# Patient Record
Sex: FEMALE | Race: WHITE | Employment: UNEMPLOYED | URBAN - METROPOLITAN AREA
[De-identification: names, ages, dates, MRNs, and addresses within clinical notes are randomized per-mention and may not be internally consistent; named-entity substitution may affect disease eponyms.]

---

## 2019-06-14 ENCOUNTER — OFFICE VISIT (OUTPATIENT)
Dept: FAMILY MEDICINE CLINIC | Age: 39
End: 2019-06-14
Payer: COMMERCIAL

## 2019-06-14 VITALS
HEIGHT: 66 IN | SYSTOLIC BLOOD PRESSURE: 104 MMHG | WEIGHT: 129 LBS | TEMPERATURE: 98.3 F | DIASTOLIC BLOOD PRESSURE: 78 MMHG | BODY MASS INDEX: 20.73 KG/M2 | RESPIRATION RATE: 14 BRPM | OXYGEN SATURATION: 98 % | HEART RATE: 74 BPM

## 2019-06-14 DIAGNOSIS — Z98.1 HISTORY OF SPINAL FUSION: ICD-10-CM

## 2019-06-14 DIAGNOSIS — Z00.00 ROUTINE HEALTH MAINTENANCE: ICD-10-CM

## 2019-06-14 DIAGNOSIS — Z98.51 HISTORY OF TUBAL LIGATION: ICD-10-CM

## 2019-06-14 DIAGNOSIS — R35.0 URINARY FREQUENCY: ICD-10-CM

## 2019-06-14 DIAGNOSIS — Z72.0 TOBACCO USE: ICD-10-CM

## 2019-06-14 DIAGNOSIS — N93.9 ABNORMAL UTERINE BLEEDING: ICD-10-CM

## 2019-06-14 DIAGNOSIS — R00.2 PALPITATION: ICD-10-CM

## 2019-06-14 DIAGNOSIS — M79.601 BILATERAL ARM PAIN: ICD-10-CM

## 2019-06-14 DIAGNOSIS — M79.602 BILATERAL ARM PAIN: ICD-10-CM

## 2019-06-14 DIAGNOSIS — Z00.00 WELL ADULT HEALTH CHECK: Primary | ICD-10-CM

## 2019-06-14 LAB
BASOPHILS ABSOLUTE: 0.1 K/UL (ref 0–0.2)
BASOPHILS RELATIVE PERCENT: 0.9 %
BILIRUBIN, POC: ABNORMAL
BLOOD URINE, POC: ABNORMAL
CLARITY, POC: CLEAR
COLOR, POC: ABNORMAL
EOSINOPHILS ABSOLUTE: 0.1 K/UL (ref 0–0.6)
EOSINOPHILS RELATIVE PERCENT: 0.9 %
FERRITIN: 25.7 NG/ML (ref 15–150)
GLUCOSE URINE, POC: ABNORMAL
HCT VFR BLD CALC: 42 % (ref 36–48)
HEMOGLOBIN: 13.9 G/DL (ref 12–16)
KETONES, POC: ABNORMAL
LEUKOCYTE EST, POC: ABNORMAL
LYMPHOCYTES ABSOLUTE: 1.3 K/UL (ref 1–5.1)
LYMPHOCYTES RELATIVE PERCENT: 18.3 %
MCH RBC QN AUTO: 30.1 PG (ref 26–34)
MCHC RBC AUTO-ENTMCNC: 33.2 G/DL (ref 31–36)
MCV RBC AUTO: 90.6 FL (ref 80–100)
MONOCYTES ABSOLUTE: 0.4 K/UL (ref 0–1.3)
MONOCYTES RELATIVE PERCENT: 5.7 %
NEUTROPHILS ABSOLUTE: 5.4 K/UL (ref 1.7–7.7)
NEUTROPHILS RELATIVE PERCENT: 74.2 %
NITRITE, POC: ABNORMAL
PDW BLD-RTO: 13.3 % (ref 12.4–15.4)
PH, POC: 7.5
PLATELET # BLD: 321 K/UL (ref 135–450)
PMV BLD AUTO: 9.8 FL (ref 5–10.5)
PROTEIN, POC: ABNORMAL
RBC # BLD: 4.64 M/UL (ref 4–5.2)
SPECIFIC GRAVITY, POC: 1.02
TSH SERPL DL<=0.05 MIU/L-ACNC: 0.45 UIU/ML (ref 0.27–4.2)
UROBILINOGEN, POC: 0.2
WBC # BLD: 7.3 K/UL (ref 4–11)

## 2019-06-14 PROCEDURE — 81002 URINALYSIS NONAUTO W/O SCOPE: CPT | Performed by: FAMILY MEDICINE

## 2019-06-14 PROCEDURE — 99385 PREV VISIT NEW AGE 18-39: CPT | Performed by: FAMILY MEDICINE

## 2019-06-14 PROCEDURE — 93000 ELECTROCARDIOGRAM COMPLETE: CPT | Performed by: FAMILY MEDICINE

## 2019-06-14 RX ORDER — NAPROXEN 375 MG/1
375 TABLET ORAL 2 TIMES DAILY WITH MEALS
Qty: 60 TABLET | Refills: 1 | Status: SHIPPED | OUTPATIENT
Start: 2019-06-14 | End: 2019-07-02 | Stop reason: DRUGHIGH

## 2019-06-14 ASSESSMENT — ENCOUNTER SYMPTOMS
SINUS PRESSURE: 0
COUGH: 0
SHORTNESS OF BREATH: 0
COLOR CHANGE: 0
SORE THROAT: 0
VOMITING: 0
DIARRHEA: 0
EYE REDNESS: 0
NAUSEA: 0

## 2019-06-14 ASSESSMENT — PATIENT HEALTH QUESTIONNAIRE - PHQ9
SUM OF ALL RESPONSES TO PHQ QUESTIONS 1-9: 2
1. LITTLE INTEREST OR PLEASURE IN DOING THINGS: 1
SUM OF ALL RESPONSES TO PHQ QUESTIONS 1-9: 2
2. FEELING DOWN, DEPRESSED OR HOPELESS: 1
SUM OF ALL RESPONSES TO PHQ9 QUESTIONS 1 & 2: 2

## 2019-06-14 NOTE — PROGRESS NOTES
6/14/2019    Fauzia Snell is a 43 y.o. female here to establish care with me. Chief Complaint   Patient presents with    New Patient     Pt is here to est. care. Pt c/o pain in both arms for th past couple of months. Pt also c/o heart palpatations for the past four months and is concerned about afib. HPI   Recently moved from [facility], [address] her to be with her father, Tamir Pike (patient here) to care for him with hospice due to his cancer  - taking the summer off to care for her father, and she will consider starting back up after Hospice  - has 3 children ages 25, 8, and 8    Has hx of spinal fusion surgery. 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  - still has some chronic neck and back pain  - pain is overall manageable, but does have some bad days when it flares up  - does have some pain that radiates into legs bilaterally  - no focal weakness or incontinence    Bilateral arm pain  - going on for the past couple of months, worse over the past month  - she has been using heat, ice, tylenol, and ibuprofen which were somewhat helpful  - pain is in bilateral wrists down to her elbows, sometimes radiating into her thumb  - does get a burning, tingling pain sometimes  - long drives will often make it worse, or doing repetitive things with her hands    Palpitations  - going on for the past 4 months  - is concerned because a fib runs in her family  - in the past was happening multiple times per day, will last from a few up to 30 minutes. Feels like she will be quite tired after it happens, or a bit dizzy  - hasn't happened now for about a week    Sees  for her gyn care. Has hx of tubal ligation. Had pap this year    Urinary  - has frequency and not emptying all the way sometimes and would like testing for UTI     Review of Systems   Constitutional: Negative for chills and fever. HENT: Negative for congestion, sinus pressure and sore throat.     Eyes: Negative for redness and visual kg/m²     BP Readings from Last 3 Encounters:   06/14/19 104/78       Wt Readings from Last 3 Encounters:   06/14/19 129 lb (58.5 kg)        Physical Exam   Constitutional: She is oriented to person, place, and time. She appears well-developed and well-nourished. HENT:   Head: Normocephalic and atraumatic. Mouth/Throat: Oropharynx is clear and moist.   TMs normal bilaterally   Eyes: Conjunctivae and EOM are normal.   Neck: Normal range of motion. Neck supple. No thyromegaly present. Cardiovascular: Normal rate, regular rhythm and normal heart sounds. No murmur heard. Pulmonary/Chest: Effort normal and breath sounds normal. She has no wheezes. Abdominal: Soft. Bowel sounds are normal. She exhibits no mass. There is no tenderness. Musculoskeletal: Normal range of motion. She exhibits no edema. NECK  Negative Spurling's test  Normal neck ROM    ELBOW  Mild TTP lateral epicondyle and extensor muscles    WRIST  Equivocal Tinel's   Lymphadenopathy:     She has no cervical adenopathy. Neurological: She is alert and oriented to person, place, and time. She displays normal reflexes. Skin: Skin is warm and dry. No rash noted. Normal turgor   Psychiatric: She has a normal mood and affect. Thought content normal.       ASSESSMENT/PLAN:  1. Well adult health check    2. Palpitation  ekg normal. Due to prolonged episodes where she feels light-headed we will check a Holter. May also be stress related with her dad being in Hospice  - EKG 12 Lead  - Holter Monitor 24 Hour; Future    3. Routine health maintenance - sees  for Gyn care    4. History of tubal ligation    5. Tobacco use  Discussed cessation. We will work toward this    6. History of spinal fusion    7. Urinary frequency  U/A unremarkable, follow culture  - POCT Urinalysis no Micro  - URINE CULTURE    8. Bilateral arm pain  ?carpal tunnel, she does have a lot of muscle tenderness sometimes seen in fibro.  Advised nighttime splinting and NSAIDs as there are no red flags such as weakness. Consider EMG in future if needed  - naproxen (NAPROSYN) 375 MG tablet; Take 1 tablet by mouth 2 times daily (with meals)  Dispense: 60 tablet; Refill: 1    9. Abnormal uterine bleeding  Saw Gyn yesterday to address this. Check to make sure not anemic.  - CBC Auto Differential; Future  - TSH without Reflex; Future  - FERRITIN; Future      Return in about 4 months (around 10/14/2019) for arm pain and palpitation follow up.

## 2019-06-16 LAB
ORGANISM: ABNORMAL
URINE CULTURE, ROUTINE: ABNORMAL
URINE CULTURE, ROUTINE: ABNORMAL

## 2019-06-17 RX ORDER — NITROFURANTOIN 25; 75 MG/1; MG/1
100 CAPSULE ORAL 2 TIMES DAILY
Qty: 14 CAPSULE | Refills: 0 | Status: SHIPPED | OUTPATIENT
Start: 2019-06-17 | End: 2019-06-24

## 2019-06-27 ENCOUNTER — OFFICE VISIT (OUTPATIENT)
Dept: FAMILY MEDICINE CLINIC | Age: 39
End: 2019-06-27
Payer: COMMERCIAL

## 2019-06-27 ENCOUNTER — TELEPHONE (OUTPATIENT)
Dept: FAMILY MEDICINE CLINIC | Age: 39
End: 2019-06-27

## 2019-06-27 VITALS
OXYGEN SATURATION: 98 % | BODY MASS INDEX: 20.67 KG/M2 | WEIGHT: 128.6 LBS | HEIGHT: 66 IN | DIASTOLIC BLOOD PRESSURE: 74 MMHG | SYSTOLIC BLOOD PRESSURE: 118 MMHG | HEART RATE: 92 BPM | RESPIRATION RATE: 15 BRPM | TEMPERATURE: 98.9 F

## 2019-06-27 DIAGNOSIS — M79.602 BILATERAL ARM PAIN: Primary | ICD-10-CM

## 2019-06-27 DIAGNOSIS — M79.601 BILATERAL ARM PAIN: Primary | ICD-10-CM

## 2019-06-27 DIAGNOSIS — R35.0 URINARY FREQUENCY: ICD-10-CM

## 2019-06-27 PROCEDURE — 99214 OFFICE O/P EST MOD 30 MIN: CPT | Performed by: FAMILY MEDICINE

## 2019-06-27 PROCEDURE — G8427 DOCREV CUR MEDS BY ELIG CLIN: HCPCS | Performed by: FAMILY MEDICINE

## 2019-06-27 PROCEDURE — G8420 CALC BMI NORM PARAMETERS: HCPCS | Performed by: FAMILY MEDICINE

## 2019-06-27 PROCEDURE — 4004F PT TOBACCO SCREEN RCVD TLK: CPT | Performed by: FAMILY MEDICINE

## 2019-06-27 RX ORDER — GABAPENTIN 100 MG/1
100 CAPSULE ORAL 3 TIMES DAILY PRN
Qty: 90 CAPSULE | Refills: 1 | Status: SHIPPED | OUTPATIENT
Start: 2019-06-27 | End: 2021-01-11 | Stop reason: SDUPTHER

## 2019-06-27 NOTE — PROGRESS NOTES
6/27/2019    This is a 43 y.o. female   Chief Complaint   Patient presents with    Arm Pain     pt is here to discuss her arm pain and the braces at night time making her arms worse. Pt c/o burning sensations and tingling for in the right arm but it is going on in both. HPI   Here for bilateral arm pain  - presumptive dx of bilateral carpal tunnel from prior visit  - tried nighttime splints which she felt wasn't helping  - describes tingling and burning pain bilateral forearms  - sometimes feels like it will start at the shoulder or the neck  - does report some neck tightness  - no weakness    Treated for UTI recently  - finishing Macrobid today  - feels like her urinary symptoms are still present  - frequency, feels like she isn't emptying all the way    Review of Systems   Genitourinary: Positive for frequency. Negative for dysuria. Musculoskeletal: Positive for neck pain. Neurological: Negative for weakness.         Tingling       Patient Active Problem List   Diagnosis    Routine health maintenance - sees  for Gyn care    History of tubal ligation    Tobacco use    History of spinal fusion        Past Medical History:   Diagnosis Date    Bipolar disorder ([facility] [address]     Pt was dx age 11       Past Surgical History:   Procedure Laterality Date    SPINAL FUSION  2013    L5 S1    TUBAL LIGATION  11/2010       Social History     Socioeconomic History    Marital status:      Spouse name: Not on file    Number of children: Not on file    Years of education: Not on file    Highest education level: Not on file   Occupational History    Not on file   Social Needs    Financial resource strain: Not on file    Food insecurity:     Worry: Not on file     Inability: Not on file    Transportation needs:     Medical: Not on file     Non-medical: Not on file   Tobacco Use    Smoking status: Current Every Day Smoker     Packs/day: 1.00    Smokeless tobacco: Never Used    Tobacco comment: pt smokes this visit. No Known Allergies    /74 (Site: Left Upper Arm, Position: Sitting, Cuff Size: Small Adult)   Pulse 92   Temp 98.9 °F (37.2 °C) (Oral)   Resp 15   Ht 5' 5.91\" (1.674 m)   Wt 128 lb 9.6 oz (58.3 kg)   SpO2 98%   BMI 20.81 kg/m²     Physical Exam   Constitutional: She is oriented to person, place, and time. She appears well-developed and well-nourished. HENT:   Head: Normocephalic and atraumatic. Pulmonary/Chest: Effort normal.   Musculoskeletal:   Neck with normal ROM  Negative Spurling's test  Pain reproduced with abduction of R arm   Neurological: She is alert and oriented to person, place, and time. Negative Tinel's at wrist, +at elbow   Skin: No pallor. Psychiatric: She has a normal mood and affect. Wt Readings from Last 3 Encounters:   06/27/19 128 lb 9.6 oz (58.3 kg)   06/14/19 129 lb (58.5 kg)       BP Readings from Last 3 Encounters:   06/27/19 118/74   06/14/19 104/78       Assessment/Plan:  Lyndsay Rader was seen today for arm pain. Diagnoses and all orders for this visit:    Bilateral arm pain  Difficult to distinguish based on exam if cervical origin or possible carpal tunnel. Check EMG. Start gabapentin and reviewed potential side effects  -     EMG; Future  -     gabapentin (NEURONTIN) 100 MG capsule; Take 1 capsule by mouth 3 times daily as needed (nerve pain) for up to 60 days. Intended supply: 90 days    Urinary frequency  Finishing tx with culture showing sensitivity to current antibiotic. Recheck culture to ensure clearance  -     Urine Culture        Return in about 2 months (around 8/27/2019).

## 2019-06-27 NOTE — TELEPHONE ENCOUNTER
Pt called stating that she could not get in to see a doctor for an EMG until September. Can we perhaps try putting it in as STAT. Pt stated to try placing referral for a different doctor but I don't see a doctors name on the referral at all. Can we place stat then I will try to call and schedule for pt.      Thank You

## 2019-06-29 ENCOUNTER — PATIENT MESSAGE (OUTPATIENT)
Dept: FAMILY MEDICINE CLINIC | Age: 39
End: 2019-06-29

## 2019-06-29 DIAGNOSIS — R10.9 ABDOMINAL DISCOMFORT: Primary | ICD-10-CM

## 2019-06-29 LAB — URINE CULTURE, ROUTINE: NORMAL

## 2019-06-29 NOTE — TELEPHONE ENCOUNTER
From: Kwadwo Shaffer  To: Javad French MD  Sent: 6/29/2019 9:54 AM EDT  Subject: Test Results Question    I have a question about URINE CULTURE resulted on 6/29/19, 7:51 AM.    So why is it that I'm still experiencing all the symptoms that I am? Is something going on with my kidneys. Would the bacterial vaginitis cause such symptoms? Would the antibiotic prescribed previously that I've now finished address that? I really don't understand why I have so many varying symptoms. After a 7 day antibiotic and nothing is showing up. Because my symptoms haven't subsided in the least bit.

## 2019-07-02 ENCOUNTER — HOSPITAL ENCOUNTER (EMERGENCY)
Age: 39
Discharge: HOME | End: 2019-07-02
Payer: COMMERCIAL

## 2019-07-02 VITALS
TEMPERATURE: 97.9 F | SYSTOLIC BLOOD PRESSURE: 121 MMHG | HEART RATE: 75 BPM | WEIGHT: 128.53 LBS | OXYGEN SATURATION: 98 % | BODY MASS INDEX: 20.17 KG/M2 | DIASTOLIC BLOOD PRESSURE: 80 MMHG | RESPIRATION RATE: 15 BRPM | HEIGHT: 67 IN

## 2019-07-02 DIAGNOSIS — N93.9 VAGINAL BLEEDING: Primary | ICD-10-CM

## 2019-07-02 DIAGNOSIS — M79.602 BILATERAL ARM PAIN: ICD-10-CM

## 2019-07-02 DIAGNOSIS — R10.84 GENERALIZED ABDOMINAL CRAMPING: ICD-10-CM

## 2019-07-02 DIAGNOSIS — Z98.890 H/O CERVICAL BIOPSY: ICD-10-CM

## 2019-07-02 DIAGNOSIS — K59.00 ACUTE CONSTIPATION: ICD-10-CM

## 2019-07-02 DIAGNOSIS — M79.601 BILATERAL ARM PAIN: ICD-10-CM

## 2019-07-02 LAB
A/G RATIO: 1.7 (ref 1.1–2.2)
ALBUMIN SERPL-MCNC: 4.6 G/DL (ref 3.4–5)
ALP BLD-CCNC: 41 U/L (ref 40–129)
ALT SERPL-CCNC: 9 U/L (ref 10–40)
ANION GAP SERPL CALCULATED.3IONS-SCNC: 11 MMOL/L (ref 3–16)
AST SERPL-CCNC: 12 U/L (ref 15–37)
BASOPHILS ABSOLUTE: 0.1 K/UL (ref 0–0.2)
BASOPHILS RELATIVE PERCENT: 0.6 %
BILIRUB SERPL-MCNC: <0.2 MG/DL (ref 0–1)
BILIRUBIN URINE: NEGATIVE
BLOOD, URINE: ABNORMAL
BUN BLDV-MCNC: 16 MG/DL (ref 7–20)
CALCIUM SERPL-MCNC: 9.6 MG/DL (ref 8.3–10.6)
CHLORIDE BLD-SCNC: 105 MMOL/L (ref 99–110)
CLARITY: ABNORMAL
CO2: 25 MMOL/L (ref 21–32)
COLOR: ABNORMAL
CREAT SERPL-MCNC: 0.6 MG/DL (ref 0.6–1.1)
EOSINOPHILS ABSOLUTE: 0.1 K/UL (ref 0–0.6)
EOSINOPHILS RELATIVE PERCENT: 1.1 %
EPITHELIAL CELLS, UA: 3 /HPF (ref 0–5)
GFR AFRICAN AMERICAN: >60
GFR NON-AFRICAN AMERICAN: >60
GLOBULIN: 2.7 G/DL
GLUCOSE BLD-MCNC: 113 MG/DL (ref 70–99)
GLUCOSE URINE: NEGATIVE MG/DL
HCG QUALITATIVE: NEGATIVE
HCG(URINE) PREGNANCY TEST: NEGATIVE
HCT VFR BLD CALC: 41.8 % (ref 36–48)
HEMOGLOBIN: 14.1 G/DL (ref 12–16)
HYALINE CASTS: 1 /LPF (ref 0–8)
KETONES, URINE: NEGATIVE MG/DL
LEUKOCYTE ESTERASE, URINE: ABNORMAL
LIPASE: 24 U/L (ref 13–60)
LYMPHOCYTES ABSOLUTE: 1.4 K/UL (ref 1–5.1)
LYMPHOCYTES RELATIVE PERCENT: 16.2 %
MCH RBC QN AUTO: 31.1 PG (ref 26–34)
MCHC RBC AUTO-ENTMCNC: 33.9 G/DL (ref 31–36)
MCV RBC AUTO: 91.8 FL (ref 80–100)
MICROSCOPIC EXAMINATION: YES
MONOCYTES ABSOLUTE: 0.4 K/UL (ref 0–1.3)
MONOCYTES RELATIVE PERCENT: 4.3 %
NEUTROPHILS ABSOLUTE: 6.5 K/UL (ref 1.7–7.7)
NEUTROPHILS RELATIVE PERCENT: 77.8 %
NITRITE, URINE: NEGATIVE
PDW BLD-RTO: 13.5 % (ref 12.4–15.4)
PH UA: 6.5 (ref 5–8)
PLATELET # BLD: 294 K/UL (ref 135–450)
PMV BLD AUTO: 7.8 FL (ref 5–10.5)
POTASSIUM REFLEX MAGNESIUM: 4.6 MMOL/L (ref 3.5–5.1)
PROTEIN UA: 30 MG/DL
RBC # BLD: 4.55 M/UL (ref 4–5.2)
RBC UA: 612 /HPF (ref 0–4)
SODIUM BLD-SCNC: 141 MMOL/L (ref 136–145)
SPECIFIC GRAVITY UA: 1.01 (ref 1–1.03)
TOTAL PROTEIN: 7.3 G/DL (ref 6.4–8.2)
URINE REFLEX TO CULTURE: YES
URINE TYPE: ABNORMAL
UROBILINOGEN, URINE: 0.2 E.U./DL
WBC # BLD: 8.4 K/UL (ref 4–11)
WBC UA: 10 /HPF (ref 0–5)

## 2019-07-02 PROCEDURE — 84703 CHORIONIC GONADOTROPIN ASSAY: CPT

## 2019-07-02 PROCEDURE — 96374 THER/PROPH/DIAG INJ IV PUSH: CPT

## 2019-07-02 PROCEDURE — 96375 TX/PRO/DX INJ NEW DRUG ADDON: CPT

## 2019-07-02 PROCEDURE — 96361 HYDRATE IV INFUSION ADD-ON: CPT

## 2019-07-02 PROCEDURE — 80053 COMPREHEN METABOLIC PANEL: CPT

## 2019-07-02 PROCEDURE — 99284 EMERGENCY DEPT VISIT MOD MDM: CPT

## 2019-07-02 PROCEDURE — 2580000003 HC RX 258: Performed by: PHYSICIAN ASSISTANT

## 2019-07-02 PROCEDURE — 87086 URINE CULTURE/COLONY COUNT: CPT

## 2019-07-02 PROCEDURE — 85025 COMPLETE CBC W/AUTO DIFF WBC: CPT

## 2019-07-02 PROCEDURE — 6360000002 HC RX W HCPCS: Performed by: PHYSICIAN ASSISTANT

## 2019-07-02 PROCEDURE — 81001 URINALYSIS AUTO W/SCOPE: CPT

## 2019-07-02 PROCEDURE — 83690 ASSAY OF LIPASE: CPT

## 2019-07-02 RX ORDER — MAGNESIUM CITRATE
300 SOLUTION, ORAL ORAL ONCE
Qty: 300 ML | Refills: 0 | Status: SHIPPED | OUTPATIENT
Start: 2019-07-02 | End: 2019-07-02

## 2019-07-02 RX ORDER — NAPROXEN 500 MG/1
500 TABLET ORAL 2 TIMES DAILY WITH MEALS
Qty: 20 TABLET | Refills: 0 | Status: SHIPPED | OUTPATIENT
Start: 2019-07-02 | End: 2019-08-29 | Stop reason: SDUPTHER

## 2019-07-02 RX ORDER — KETOROLAC TROMETHAMINE 30 MG/ML
15 INJECTION, SOLUTION INTRAMUSCULAR; INTRAVENOUS ONCE
Status: COMPLETED | OUTPATIENT
Start: 2019-07-02 | End: 2019-07-02

## 2019-07-02 RX ORDER — DICYCLOMINE HCL 20 MG
20 TABLET ORAL EVERY 6 HOURS PRN
Qty: 20 TABLET | Refills: 0 | Status: SHIPPED | OUTPATIENT
Start: 2019-07-02 | End: 2019-08-07 | Stop reason: SDUPTHER

## 2019-07-02 RX ORDER — ACETAMINOPHEN 500 MG
1000 TABLET ORAL EVERY 6 HOURS PRN
Qty: 30 TABLET | Refills: 0 | Status: SHIPPED | OUTPATIENT
Start: 2019-07-02

## 2019-07-02 RX ORDER — SODIUM CHLORIDE, SODIUM LACTATE, POTASSIUM CHLORIDE, CALCIUM CHLORIDE 600; 310; 30; 20 MG/100ML; MG/100ML; MG/100ML; MG/100ML
1000 INJECTION, SOLUTION INTRAVENOUS ONCE
Status: COMPLETED | OUTPATIENT
Start: 2019-07-02 | End: 2019-07-02

## 2019-07-02 RX ORDER — ONDANSETRON 2 MG/ML
4 INJECTION INTRAMUSCULAR; INTRAVENOUS EVERY 30 MIN PRN
Status: DISCONTINUED | OUTPATIENT
Start: 2019-07-02 | End: 2019-07-02 | Stop reason: HOSPADM

## 2019-07-02 RX ADMIN — ONDANSETRON 4 MG: 2 INJECTION INTRAMUSCULAR; INTRAVENOUS at 11:34

## 2019-07-02 RX ADMIN — SODIUM CHLORIDE, POTASSIUM CHLORIDE, SODIUM LACTATE AND CALCIUM CHLORIDE 1000 ML: 600; 310; 30; 20 INJECTION, SOLUTION INTRAVENOUS at 11:36

## 2019-07-02 RX ADMIN — KETOROLAC TROMETHAMINE 15 MG: 30 INJECTION, SOLUTION INTRAMUSCULAR at 11:34

## 2019-07-02 ASSESSMENT — PAIN DESCRIPTION - DESCRIPTORS
DESCRIPTORS: PRESSURE;CRAMPING
DESCRIPTORS: CRAMPING
DESCRIPTORS: CRAMPING

## 2019-07-02 ASSESSMENT — PAIN SCALES - GENERAL
PAINLEVEL_OUTOF10: 2
PAINLEVEL_OUTOF10: 2
PAINLEVEL_OUTOF10: 5
PAINLEVEL_OUTOF10: 5

## 2019-07-02 ASSESSMENT — PAIN DESCRIPTION - FREQUENCY
FREQUENCY: CONTINUOUS

## 2019-07-02 ASSESSMENT — PAIN DESCRIPTION - ONSET
ONSET: ON-GOING
ONSET: ON-GOING

## 2019-07-02 ASSESSMENT — PAIN DESCRIPTION - LOCATION
LOCATION: ABDOMEN

## 2019-07-02 ASSESSMENT — PAIN DESCRIPTION - PROGRESSION
CLINICAL_PROGRESSION: NOT CHANGED
CLINICAL_PROGRESSION: NOT CHANGED
CLINICAL_PROGRESSION: GRADUALLY IMPROVING

## 2019-07-02 ASSESSMENT — PAIN DESCRIPTION - PAIN TYPE
TYPE: ACUTE PAIN
TYPE: ACUTE PAIN

## 2019-07-02 ASSESSMENT — PAIN - FUNCTIONAL ASSESSMENT: PAIN_FUNCTIONAL_ASSESSMENT: PREVENTS OR INTERFERES SOME ACTIVE ACTIVITIES AND ADLS

## 2019-07-02 NOTE — ED PROVIDER NOTES
[address]  [address] [facility] [address]  Dept: [phone]  Loc: [phone]    EMERGENCY DEPARTMENT ENCOUNTER        This patient was not seen or evaluated by the attending physician. I evaluated this patient, the attending physician was available for consultation. CHIEF COMPLAINT    Chief Complaint   Patient presents with    Abdominal Pain     had cervical biopsy 5 days ago and now with constipation, vaginal bleeding, abd cramps and passed \"tissue\" this morning. HPI    Krzysztof Hanna is a 43 y.o. female who presents with vaginal bleeding. Onset was 5 days agp. The context was that the patient had a cervical biopsy on Thursday. The quality of the bleeding is bright red blood, she passed a \"piece of tissue\" this AM.  She states that she has used 3-7 pads daily. The patient complains of associated diffuse abdominal cramping. The patient's pain is 5/10 in severity. There are no alleviating factors. The patient states she had a BM yesterday morning, but has felt constipated for the past 5 days. She states she took MiraLAX with minimal relief. REVIEW OF SYSTEMS    General: No fevers or chills  : no hematuria, no flank pain  GI: No vomiting or diarrhea  Pulmonary: No difficulty breathing or cough  Neurologic: No loss of consciousness or syncope  See HPI for further details. All other systems reviewed and are negative.     PAST MEDICAL & SURGICAL HISTORY    Past Medical History:   Diagnosis Date    Bipolar disorder ([facility] [address]     Pt was dx age 14    HPV in female     Ovarian cyst     Ovarian cyst rupture      Past Surgical History:   Procedure Laterality Date    CERVIX SURGERY      biopsy    SPINAL FUSION  2013    L5 S1    TUBAL LIGATION  11/2010       CURRENT MEDICATIONS    Current Outpatient Rx   Medication Sig Dispense Refill    gabapentin (NEURONTIN) 100 MG capsule Take 1 capsule by mouth 3 times Social History Narrative    Recently moved from [facility] [address] her to be with her father, Simran Love (patient here) to care for him with hospice due to his cancer    - taking the summer off to care for her father, and she will consider starting back up after Hospice    - has 3 children ages 25, 8, and 6       PHYSICAL EXAM    VITAL SIGNS: /84   Pulse 71   Temp 97.9 °F (36.6 °C) (Oral)   Resp 18   Ht 5' 7\" (1.702 m)   Wt 128 lb 8.5 oz (58.3 kg)   LMP 06/05/2019   SpO2 100%   BMI 20.13 kg/m²    Constitutional:  Well-developed, well-nourished, appears comfortable  Eyes:  Non-icteric sclera, no conjunctival injection   HENT:  Atraumatic, external nose normal.   NECK: Supple, no JVD   Respiratory:  No respiratory distress, normal breath sounds   Cardiovascular:  regular rate, no murmurs  GI:  Soft, +mild diffuse abdominal tenderness, bowel sounds unremarkable   :  Pelvic exam performed with chaperone reveals: Normally developed external genitalia with no cutaneous or mucosal lesions or vesicles, no bleeding from biopsy site on the cervix, small amount of blood oozing from the os, no other discharge or tissue in the vault, no cervical motion tenderness  Integument:  Nondiaphoretic skin, no obvious rashes  Neurologic: Awake and oriented, no slurred speech    ED COURSE & MEDICAL DECISION MAKING    Pertinent Labs & Imaging studies reviewed and interpreted. (See chart for details)  See chart for details of medications given during the ED stay. Vitals:    07/02/19 1055   BP: 138/84   Pulse: 71   Resp: 18   Temp: 97.9 °F (36.6 °C)   TempSrc: Oral   SpO2: 100%   Weight: 128 lb 8.5 oz (58.3 kg)   Height: 5' 7\" (1.702 m)       Differential diagnosis: dysfunctional uterine bleeding, cervical laceration, vaginal laceration, ectopic pregnancy, molar pregnancy, miscarriage, hemorrhagic Shock, Rh incompatibility, UTI, placenta previa, other    Patient is afebrile and nontoxic in appearance.   Vaginal exam unremarkable with only a small amount of blood oozing from the os. No hemorrhage from biopsy site. Mild diffuse abdominal cramping, likely secondary to constipation. I have low suspicion for acute intra-abdominal pathology at this time. Labs reveal no leukocytosis. Hemoglobin 14.1, within normal limits. Metabolic panel unremarkable. Lipase within normal limits. Urinalysis reveals 612 red cells, likely vaginal contamination. Reevaluation at 12:20 PM: Patient is resting comfortably. I believe the patient is safe for discharge at this time. She will need close follow-up with her gynecologist.  I will also prescribe laxatives. I instructed the patient to follow up as an outpatient in 1 day with gynecology. I instructed the patient to return to the ED immediately for any new or worsening symptoms. The patient verbalizes understanding. FINAL IMPRESSION    1. Vaginal bleeding    2. H/O cervical biopsy    3. Generalized abdominal cramping    4.  Acute constipation        PLAN  Discharge with outpatient follow-up    (Please note that this note was completed with a voice recognition program.  Every attempt was made to edit the dictations, but inevitably there remain words that are mis-transcribed.)            [place], [place]  07/02/19 0024

## 2019-07-02 NOTE — ED NOTES
Pt reports IV is \"burning. \" assessed site - no redness, swelling, or any signs of infiltration noted. Pt also states can taste the flush I used to check site but the site is still uncomfortable per pt. I offered to take IV out and replace it, but pt refused. Will continue to monitor site. Resp even and unlabored. A/ox4. No acute distress noted. Denies any need at this time. Call light within reach. Bed in lowest position. Will continue to monitor.         Janessa Negrete RN  07/02/19 1200

## 2019-07-03 LAB — URINE CULTURE, ROUTINE: NORMAL

## 2019-07-14 PROBLEM — Z00.00 ROUTINE HEALTH MAINTENANCE: Status: RESOLVED | Noted: 2019-06-14 | Resolved: 2019-07-14

## 2019-08-01 ENCOUNTER — OFFICE VISIT (OUTPATIENT)
Dept: FAMILY MEDICINE CLINIC | Age: 39
End: 2019-08-01
Payer: COMMERCIAL

## 2019-08-01 VITALS
WEIGHT: 131 LBS | SYSTOLIC BLOOD PRESSURE: 108 MMHG | DIASTOLIC BLOOD PRESSURE: 62 MMHG | HEIGHT: 67 IN | HEART RATE: 72 BPM | TEMPERATURE: 98.4 F | RESPIRATION RATE: 16 BRPM | BODY MASS INDEX: 20.56 KG/M2

## 2019-08-01 DIAGNOSIS — F43.0 ACUTE REACTION TO SITUATIONAL STRESS: ICD-10-CM

## 2019-08-01 DIAGNOSIS — F41.9 ANXIETY AND DEPRESSION: ICD-10-CM

## 2019-08-01 DIAGNOSIS — R14.0 ABDOMINAL BLOATING WITH CRAMPS: Primary | ICD-10-CM

## 2019-08-01 DIAGNOSIS — K59.00 CONSTIPATION, UNSPECIFIED CONSTIPATION TYPE: ICD-10-CM

## 2019-08-01 DIAGNOSIS — Z72.0 TOBACCO USE: ICD-10-CM

## 2019-08-01 DIAGNOSIS — R10.9 ABDOMINAL BLOATING WITH CRAMPS: ICD-10-CM

## 2019-08-01 DIAGNOSIS — R14.0 ABDOMINAL BLOATING WITH CRAMPS: ICD-10-CM

## 2019-08-01 DIAGNOSIS — F32.A ANXIETY AND DEPRESSION: ICD-10-CM

## 2019-08-01 DIAGNOSIS — R10.9 ABDOMINAL BLOATING WITH CRAMPS: Primary | ICD-10-CM

## 2019-08-01 LAB
C-REACTIVE PROTEIN: <0.3 MG/L (ref 0–5.1)
IGA: 134 MG/DL (ref 70–400)
SEDIMENTATION RATE, ERYTHROCYTE: 3 MM/HR (ref 0–20)

## 2019-08-01 PROCEDURE — G8427 DOCREV CUR MEDS BY ELIG CLIN: HCPCS | Performed by: FAMILY MEDICINE

## 2019-08-01 PROCEDURE — 99214 OFFICE O/P EST MOD 30 MIN: CPT | Performed by: FAMILY MEDICINE

## 2019-08-01 PROCEDURE — G8420 CALC BMI NORM PARAMETERS: HCPCS | Performed by: FAMILY MEDICINE

## 2019-08-01 PROCEDURE — 4004F PT TOBACCO SCREEN RCVD TLK: CPT | Performed by: FAMILY MEDICINE

## 2019-08-01 RX ORDER — LANOLIN ALCOHOL/MO/W.PET/CERES
3 CREAM (GRAM) TOPICAL DAILY
COMMUNITY

## 2019-08-01 RX ORDER — DULOXETIN HYDROCHLORIDE 20 MG/1
20 CAPSULE, DELAYED RELEASE ORAL DAILY
Qty: 30 CAPSULE | Refills: 1 | Status: SHIPPED | OUTPATIENT
Start: 2019-08-01 | End: 2019-08-29 | Stop reason: DRUGHIGH

## 2019-08-01 RX ORDER — METRONIDAZOLE 500 MG/1
500 TABLET ORAL 3 TIMES DAILY
Status: ON HOLD | COMMUNITY
End: 2020-02-18 | Stop reason: ALTCHOICE

## 2019-08-01 RX ORDER — NICOTINE 21 MG/24HR
1 PATCH, TRANSDERMAL 24 HOURS TRANSDERMAL EVERY 24 HOURS
Qty: 30 PATCH | Refills: 2 | Status: SHIPPED | OUTPATIENT
Start: 2019-08-01 | End: 2021-04-15

## 2019-08-01 ASSESSMENT — ENCOUNTER SYMPTOMS
ABDOMINAL PAIN: 1
CONSTIPATION: 1
COUGH: 0
SHORTNESS OF BREATH: 0

## 2019-08-01 NOTE — PROGRESS NOTES
8/1/2019    This is a 43 y.o. female here for:  HPI   Stomach pain and constipation  - going on for a few months  - has bentyl PRN  - went to the ER on 7/2 for vaginal bleeding, but also because she hadn't had a BM in about 5 days  - she took mag citrate and other OTC stool softeners and had a BM a few days later  - still with residual issues with constipation, very hard and small amounts   - also having cramping and bloating  - currently taking Colace and Miralax intermittently    Tobacco  - really would like to quit  - cut down to a few cigarettes per day, but recent stressors brought her up to 1/2 - 1 ppd    A lot of recent stress. Her dad is in hospice with rectal cancer. Her daughter is currently admitted to the hospital    Mood  Admits to feeling down over the past year or so  -This is been a chronic issue in the past but is worsened recently with the stressors noted above  -She did well on Cymbalta in the past would like to consider restarting this medication    Review of Systems   Constitutional: Negative for chills and fever. Respiratory: Negative for cough and shortness of breath. Cardiovascular: Negative for chest pain. Gastrointestinal: Positive for abdominal pain and constipation.        Patient Active Problem List   Diagnosis    History of tubal ligation    Tobacco use    History of spinal fusion        Past Medical History:   Diagnosis Date    Bipolar disorder ([facility] [address]     Pt was dx age 14    HPV in female    [facility] Ovarian cyst     Ovarian cyst rupture        Past Surgical History:   Procedure Laterality Date    CERVIX SURGERY      biopsy    SPINAL FUSION  2013    L5 S1    TUBAL LIGATION  11/2010       Social History     Socioeconomic History    Marital status:      Spouse name: Not on file    Number of children: Not on file    Years of education: Not on file    Highest education level: Not on file   Occupational History    Not on file   Social Needs    Financial resource strain: Not on file    Food insecurity:     Worry: Not on file     Inability: Not on file    Transportation needs:     Medical: Not on file     Non-medical: Not on file   Tobacco Use    Smoking status: Current Every Day Smoker     Packs/day: 2.00    Smokeless tobacco: Never Used    Tobacco comment: pt smokes on average half a pack to one pack a day    Substance and Sexual Activity    Alcohol use: Not Currently     Comment: very rare    Drug use: Never    Sexual activity: Not on file   Lifestyle    Physical activity:     Days per week: Not on file     Minutes per session: Not on file    Stress: Not on file   Relationships    Social connections:     Talks on phone: Not on file     Gets together: Not on file     Attends Hindu service: Not on file     Active member of club or organization: Not on file     Attends meetings of clubs or organizations: Not on file     Relationship status: Not on file    Intimate partner violence:     Fear of current or ex partner: Not on file     Emotionally abused: Not on file     Physically abused: Not on file     Forced sexual activity: Not on file   Other Topics Concern    Not on file   Social History Narrative    Recently moved from [address] her to be with her father, Mauro Cortes (patient here) to care for him with hospice due to his cancer    - taking the summer off to care for her father, and she will consider starting back up after Hospice    - has 3 children ages 25, 8, and 6       Family History   Problem Relation Age of Onset    Asthma Father     Other Father     Cancer Maternal Aunt         ovarian and cervical     Asthma Maternal Aunt     Hyperthyroidism Maternal Aunt     Cancer Maternal Uncle     Prostate Cancer Paternal Uncle     Breast Cancer Maternal Grandmother     Diabetes Maternal Grandfather     High Blood Pressure Maternal Grandfather     Prostate Cancer Paternal Grandfather        Current Outpatient Medications   Medication Sig from Last 3 Encounters:   08/01/19 108/62   07/02/19 121/80   06/27/19 118/74     Assessment/Plan:  Cayden Velasquez was seen today for constipation, vaginal odor and mood swings. Diagnoses and all orders for this visit:    Abdominal bloating with cramps  We will perform a work-up for other potential etiologies. I advised that she continue to use Bentyl as needed  -     TISSUE TRANSGLUTAMINASE ANTOBODY IGA W/ REFLEX; Future  -     IGA; Future  -     SEDIMENTATION RATE; Future  -     C-REACTIVE PROTEIN; Future  -     CT ABDOMEN PELVIS WO CONTRAST Additional Contrast? None; Future    Constipation, unspecified constipation type  Discussed a more rigorous bowel regimen to help with constipation including daily MiraLAX and Colace    Tobacco use  We discussed different cessation possibilities. She will try nicotine patches and we may consider Chantix in the future  -     nicotine (NICODERM CQ) 14 MG/24HR; Place 1 patch onto the skin every 24 hours    Acute reaction to situational stress    Anxiety and depression  Did well on Cymbalta in the past so we will restart this. A lot of external stressors with her daughter in the hospital and her dad in hospice. Encouraged therapy and referral placed. Discussed side effects such as GI upset and diarrhea that typically improve after a couple of weeks. Discussed risk of mood change and SI/HI.  -     DULoxetine (CYMBALTA) 20 MG extended release capsule; Take 1 capsule by mouth daily  -     External Referral To Psychology        Return in about 6 weeks (around 9/12/2019) for abdominal pain and anxiety follow up.

## 2019-08-02 ENCOUNTER — TELEPHONE (OUTPATIENT)
Dept: FAMILY MEDICINE CLINIC | Age: 39
End: 2019-08-02

## 2019-08-03 LAB — TISSUE TRANSGLUTAMINASE IGA: 0 U/ML (ref 0–3)

## 2019-08-07 RX ORDER — DICYCLOMINE HCL 20 MG
20 TABLET ORAL EVERY 6 HOURS PRN
Qty: 30 TABLET | Refills: 1 | Status: SHIPPED | OUTPATIENT
Start: 2019-08-07 | End: 2019-08-29

## 2019-08-12 ENCOUNTER — HOSPITAL ENCOUNTER (OUTPATIENT)
Dept: CT IMAGING | Age: 39
Discharge: HOME | End: 2019-08-12
Payer: COMMERCIAL

## 2019-08-12 DIAGNOSIS — R14.0 ABDOMINAL BLOATING WITH CRAMPS: ICD-10-CM

## 2019-08-12 DIAGNOSIS — R10.9 ABDOMINAL BLOATING WITH CRAMPS: ICD-10-CM

## 2019-08-12 PROCEDURE — 74176 CT ABD & PELVIS W/O CONTRAST: CPT

## 2019-08-12 PROCEDURE — 6360000004 HC RX CONTRAST MEDICATION: Performed by: FAMILY MEDICINE

## 2019-08-12 RX ADMIN — IOHEXOL 50 ML: 240 INJECTION, SOLUTION INTRATHECAL; INTRAVASCULAR; INTRAVENOUS; ORAL at 11:52

## 2019-08-20 ENCOUNTER — HOSPITAL ENCOUNTER (OUTPATIENT)
Dept: NEUROLOGY | Age: 39
Discharge: HOME | End: 2019-08-20
Payer: COMMERCIAL

## 2019-08-20 DIAGNOSIS — M79.602 BILATERAL ARM PAIN: ICD-10-CM

## 2019-08-20 DIAGNOSIS — M79.601 BILATERAL ARM PAIN: ICD-10-CM

## 2019-08-20 PROCEDURE — 95910 NRV CNDJ TEST 7-8 STUDIES: CPT

## 2019-08-20 PROCEDURE — 95861 NEEDLE EMG 2 EXTREMITIES: CPT

## 2019-08-29 ENCOUNTER — OFFICE VISIT (OUTPATIENT)
Dept: FAMILY MEDICINE CLINIC | Age: 39
End: 2019-08-29
Payer: COMMERCIAL

## 2019-08-29 VITALS
BODY MASS INDEX: 21.16 KG/M2 | WEIGHT: 134.8 LBS | DIASTOLIC BLOOD PRESSURE: 74 MMHG | SYSTOLIC BLOOD PRESSURE: 116 MMHG | TEMPERATURE: 96.8 F | HEART RATE: 64 BPM | OXYGEN SATURATION: 99 % | HEIGHT: 67 IN | RESPIRATION RATE: 14 BRPM

## 2019-08-29 DIAGNOSIS — Z72.0 TOBACCO USE: ICD-10-CM

## 2019-08-29 DIAGNOSIS — F41.9 ANXIETY AND DEPRESSION: ICD-10-CM

## 2019-08-29 DIAGNOSIS — F32.A ANXIETY AND DEPRESSION: ICD-10-CM

## 2019-08-29 DIAGNOSIS — G89.29 CHRONIC PELVIC PAIN IN FEMALE: ICD-10-CM

## 2019-08-29 DIAGNOSIS — R10.2 CHRONIC PELVIC PAIN IN FEMALE: ICD-10-CM

## 2019-08-29 DIAGNOSIS — G56.03 BILATERAL CARPAL TUNNEL SYNDROME: Primary | ICD-10-CM

## 2019-08-29 PROCEDURE — G8420 CALC BMI NORM PARAMETERS: HCPCS | Performed by: FAMILY MEDICINE

## 2019-08-29 PROCEDURE — 4004F PT TOBACCO SCREEN RCVD TLK: CPT | Performed by: FAMILY MEDICINE

## 2019-08-29 PROCEDURE — 99214 OFFICE O/P EST MOD 30 MIN: CPT | Performed by: FAMILY MEDICINE

## 2019-08-29 PROCEDURE — G8427 DOCREV CUR MEDS BY ELIG CLIN: HCPCS | Performed by: FAMILY MEDICINE

## 2019-08-29 RX ORDER — DULOXETIN HYDROCHLORIDE 30 MG/1
CAPSULE, DELAYED RELEASE ORAL
Qty: 60 CAPSULE | Refills: 3 | Status: SHIPPED | OUTPATIENT
Start: 2019-08-29 | End: 2020-12-24

## 2019-08-29 RX ORDER — NAPROXEN 500 MG/1
500 TABLET ORAL 2 TIMES DAILY WITH MEALS
Qty: 30 TABLET | Refills: 2 | Status: SHIPPED | OUTPATIENT
Start: 2019-08-29 | End: 2019-12-13 | Stop reason: SDUPTHER

## 2019-08-29 NOTE — PROGRESS NOTES
Yes Historical Provider, MD   nicotine (NICODERM CQ) 14 MG/24HR Place 1 patch onto the skin every 24 hours Yes Smith Coronado MD   acetaminophen (APAP EXTRA STRENGTH) 500 MG tablet Take 2 tablets by mouth every 6 hours as needed for Pain DO NOT TAKE WITH OTHER MEDICATIONS CONTAINING ACETAMINOPHEN. Yes Dov Severa Grebe, PA   naproxen (NAPROSYN) 500 MG tablet Take 1 tablet by mouth 2 times daily (with meals) Yes Dov Severa Grebe, PA   polyethylene glycol (GOLYTELY) 236 g solution Take 8 ounces every 30 minutes until stool runs clear. May stop the medicine then. Yes Dov Severa Grebe, PA   gabapentin (NEURONTIN) 100 MG capsule Take 1 capsule by mouth 3 times daily as needed (nerve pain) for up to 60 days.  Intended supply: 80 days  Smith Coronado MD     Health Maintenance   Topic Date Due    Pneumococcal 0-64 years Vaccine (1 of 1 - PPSV23) 11/17/1985    HIV screen  11/17/1994    DTaP/Tdap/Td vaccine (1 - Tdap) 11/17/1998    Cervical cancer screen  11/17/2000    Flu vaccine (1) 09/01/2019     Past Medical History:   Diagnosis Date    Bipolar disorder ([facility] [address]     Pt was dx age 11    HPV in female     Ovarian cyst     Ovarian cyst rupture      Social History     Socioeconomic History    Marital status:      Spouse name: None    Number of children: None    Years of education: None    Highest education level: None   Occupational History    None   Social Needs    Financial resource strain: None    Food insecurity:     Worry: None     Inability: None    Transportation needs:     Medical: None     Non-medical: None   Tobacco Use    Smoking status: Current Every Day Smoker     Packs/day: 2.00    Smokeless tobacco: Never Used    Tobacco comment: pt smokes on average half a pack to one pack a day    Substance and Sexual Activity    Alcohol use: Not Currently     Comment: very rare    Drug use: Never    Sexual activity: None   Lifestyle    Physical activity:     Days per week: None     Minutes per session: None   

## 2019-09-12 ENCOUNTER — HOSPITAL ENCOUNTER (OUTPATIENT)
Dept: PHYSICAL THERAPY | Age: 39
Setting detail: THERAPIES SERIES
Discharge: HOME | End: 2019-09-12
Payer: COMMERCIAL

## 2019-09-12 PROCEDURE — 97162 PT EVAL MOD COMPLEX 30 MIN: CPT

## 2019-09-12 PROCEDURE — 97110 THERAPEUTIC EXERCISES: CPT

## 2019-09-12 PROCEDURE — 97530 THERAPEUTIC ACTIVITIES: CPT

## 2019-09-12 ASSESSMENT — PAIN DESCRIPTION - LOCATION: LOCATION: ARM;FINGER (COMMENT WHICH ONE);HAND

## 2019-09-12 ASSESSMENT — PAIN DESCRIPTION - PAIN TYPE: TYPE: CHRONIC PAIN

## 2019-09-12 ASSESSMENT — PAIN SCALES - GENERAL: PAINLEVEL_OUTOF10: 3

## 2019-09-12 ASSESSMENT — PAIN DESCRIPTION - ORIENTATION: ORIENTATION: RIGHT;LEFT

## 2019-09-12 NOTE — PROGRESS NOTES
Em Elizalde PT, DPT    If you have any questions or concerns, please don't hesitate to call.   Thank you for your referral.    Physician Signature:________________________________Date:__________________  By signing above, therapists plan is approved by physician    Please sign and return fax to [phone]

## 2019-09-17 ENCOUNTER — HOSPITAL ENCOUNTER (OUTPATIENT)
Dept: PHYSICAL THERAPY | Age: 39
Setting detail: THERAPIES SERIES
Discharge: HOME | End: 2019-09-17
Payer: COMMERCIAL

## 2019-09-24 ENCOUNTER — HOSPITAL ENCOUNTER (OUTPATIENT)
Dept: PHYSICAL THERAPY | Age: 39
Setting detail: THERAPIES SERIES
Discharge: HOME | End: 2019-09-24
Payer: COMMERCIAL

## 2019-09-24 PROCEDURE — 97140 MANUAL THERAPY 1/> REGIONS: CPT

## 2019-09-24 PROCEDURE — 97110 THERAPEUTIC EXERCISES: CPT

## 2019-09-24 NOTE — FLOWSHEET NOTE
session. Treatment/Activity Tolerance:   [x]Patient tolerated treatment well [] Patient limited by fatique  []Patient limited by pain [] Patient limited by other medical complications  [] Other:     Goals:    Short term goals  Time Frame for Short term goals: 6 weeks  Short term goal 1: patient to be independent with HEP to self-manage symptoms. Short term goal 2: patient to improve (B) UE strength by at least +1/2mm grade to improve use of UEs. Short term goal 3: patient to consistently report pain peaking at less than 4/10 to improve quality of life. Short term goal 4: patient to improve quick dash score by at least 10 points demonstrating improved participation in everyday activity. Plan: [x] Continue per plan of care [] Alter current plan (see comments)   [] Plan of care initiated [] Hold pending MD visit [] Discharge      Plan for Next Session:  Biomechanical correction of problems as they present. Facilitate correct muscle firing patterns and activation with appropriate activities. Progress patient as tolerated.      Re-Certification Due Date: visit 12      Signature:  Amy Aragon , PT, DPT

## 2019-09-27 ENCOUNTER — HOSPITAL ENCOUNTER (OUTPATIENT)
Dept: PHYSICAL THERAPY | Age: 39
Setting detail: THERAPIES SERIES
Discharge: HOME | End: 2019-09-27
Payer: COMMERCIAL

## 2019-09-27 PROCEDURE — 97140 MANUAL THERAPY 1/> REGIONS: CPT

## 2019-09-27 PROCEDURE — 97110 THERAPEUTIC EXERCISES: CPT

## 2019-10-01 ENCOUNTER — HOSPITAL ENCOUNTER (OUTPATIENT)
Dept: PHYSICAL THERAPY | Age: 39
Setting detail: THERAPIES SERIES
Discharge: HOME | End: 2019-10-01
Payer: COMMERCIAL

## 2019-10-01 PROCEDURE — 97110 THERAPEUTIC EXERCISES: CPT

## 2019-10-01 PROCEDURE — 97140 MANUAL THERAPY 1/> REGIONS: CPT

## 2019-10-03 ENCOUNTER — HOSPITAL ENCOUNTER (OUTPATIENT)
Dept: PHYSICAL THERAPY | Age: 39
Setting detail: THERAPIES SERIES
Discharge: HOME | End: 2019-10-03
Payer: COMMERCIAL

## 2019-10-03 PROCEDURE — 97140 MANUAL THERAPY 1/> REGIONS: CPT

## 2019-10-03 PROCEDURE — 97110 THERAPEUTIC EXERCISES: CPT

## 2019-10-04 ENCOUNTER — APPOINTMENT (OUTPATIENT)
Dept: PHYSICAL THERAPY | Age: 39
End: 2019-10-04
Payer: COMMERCIAL

## 2019-10-08 ENCOUNTER — HOSPITAL ENCOUNTER (OUTPATIENT)
Dept: PHYSICAL THERAPY | Age: 39
Setting detail: THERAPIES SERIES
Discharge: HOME | End: 2019-10-08
Payer: COMMERCIAL

## 2019-10-08 PROCEDURE — 97110 THERAPEUTIC EXERCISES: CPT

## 2019-10-08 PROCEDURE — 97140 MANUAL THERAPY 1/> REGIONS: CPT

## 2019-10-10 ENCOUNTER — HOSPITAL ENCOUNTER (OUTPATIENT)
Dept: PHYSICAL THERAPY | Age: 39
Setting detail: THERAPIES SERIES
Discharge: HOME | End: 2019-10-10
Payer: COMMERCIAL

## 2019-10-10 PROCEDURE — 97110 THERAPEUTIC EXERCISES: CPT

## 2019-10-10 PROCEDURE — 97140 MANUAL THERAPY 1/> REGIONS: CPT

## 2019-10-14 ENCOUNTER — OFFICE VISIT (OUTPATIENT)
Dept: FAMILY MEDICINE CLINIC | Age: 39
End: 2019-10-14
Payer: COMMERCIAL

## 2019-10-14 VITALS
SYSTOLIC BLOOD PRESSURE: 100 MMHG | RESPIRATION RATE: 15 BRPM | TEMPERATURE: 97.8 F | BODY MASS INDEX: 21.5 KG/M2 | WEIGHT: 137 LBS | OXYGEN SATURATION: 100 % | HEART RATE: 62 BPM | HEIGHT: 67 IN | DIASTOLIC BLOOD PRESSURE: 62 MMHG

## 2019-10-14 DIAGNOSIS — M79.622 PAIN IN BOTH UPPER ARMS: Primary | ICD-10-CM

## 2019-10-14 DIAGNOSIS — M54.2 CHRONIC NECK PAIN: ICD-10-CM

## 2019-10-14 DIAGNOSIS — G56.03 BILATERAL CARPAL TUNNEL SYNDROME: ICD-10-CM

## 2019-10-14 DIAGNOSIS — F32.A ANXIETY AND DEPRESSION: ICD-10-CM

## 2019-10-14 DIAGNOSIS — G89.29 CHRONIC NECK PAIN: ICD-10-CM

## 2019-10-14 DIAGNOSIS — Z72.0 TOBACCO USE: ICD-10-CM

## 2019-10-14 DIAGNOSIS — F41.9 ANXIETY AND DEPRESSION: ICD-10-CM

## 2019-10-14 DIAGNOSIS — M79.621 PAIN IN BOTH UPPER ARMS: Primary | ICD-10-CM

## 2019-10-14 PROCEDURE — 4004F PT TOBACCO SCREEN RCVD TLK: CPT | Performed by: FAMILY MEDICINE

## 2019-10-14 PROCEDURE — G8420 CALC BMI NORM PARAMETERS: HCPCS | Performed by: FAMILY MEDICINE

## 2019-10-14 PROCEDURE — G8427 DOCREV CUR MEDS BY ELIG CLIN: HCPCS | Performed by: FAMILY MEDICINE

## 2019-10-14 PROCEDURE — G8484 FLU IMMUNIZE NO ADMIN: HCPCS | Performed by: FAMILY MEDICINE

## 2019-10-14 PROCEDURE — 99214 OFFICE O/P EST MOD 30 MIN: CPT | Performed by: FAMILY MEDICINE

## 2019-10-17 ASSESSMENT — ENCOUNTER SYMPTOMS
SHORTNESS OF BREATH: 0
COUGH: 0

## 2019-10-25 ENCOUNTER — HOSPITAL ENCOUNTER (OUTPATIENT)
Dept: PHYSICAL THERAPY | Age: 39
Setting detail: THERAPIES SERIES
Discharge: HOME | End: 2019-10-25
Payer: COMMERCIAL

## 2019-10-29 ENCOUNTER — APPOINTMENT (OUTPATIENT)
Dept: PHYSICAL THERAPY | Age: 39
End: 2019-10-29
Payer: COMMERCIAL

## 2019-10-31 ENCOUNTER — APPOINTMENT (OUTPATIENT)
Dept: PHYSICAL THERAPY | Age: 39
End: 2019-10-31
Payer: COMMERCIAL

## 2019-11-05 ENCOUNTER — TELEPHONE (OUTPATIENT)
Dept: FAMILY MEDICINE CLINIC | Age: 39
End: 2019-11-05

## 2019-11-05 ENCOUNTER — HOSPITAL ENCOUNTER (OUTPATIENT)
Dept: MRI IMAGING | Age: 39
Discharge: HOME | End: 2019-11-05
Payer: COMMERCIAL

## 2019-11-05 ENCOUNTER — HOSPITAL ENCOUNTER (OUTPATIENT)
Dept: PHYSICAL THERAPY | Age: 39
Setting detail: THERAPIES SERIES
Discharge: HOME | End: 2019-11-05
Payer: COMMERCIAL

## 2019-11-05 DIAGNOSIS — G89.29 CHRONIC NECK PAIN: ICD-10-CM

## 2019-11-05 DIAGNOSIS — M79.622 PAIN IN BOTH UPPER ARMS: ICD-10-CM

## 2019-11-05 DIAGNOSIS — M54.2 CHRONIC NECK PAIN: ICD-10-CM

## 2019-11-05 DIAGNOSIS — M79.621 PAIN IN BOTH UPPER ARMS: ICD-10-CM

## 2019-11-05 PROCEDURE — 72141 MRI NECK SPINE W/O DYE: CPT

## 2019-11-05 PROCEDURE — 97140 MANUAL THERAPY 1/> REGIONS: CPT

## 2019-11-05 PROCEDURE — 97110 THERAPEUTIC EXERCISES: CPT

## 2019-11-07 ENCOUNTER — HOSPITAL ENCOUNTER (OUTPATIENT)
Dept: PHYSICAL THERAPY | Age: 39
Setting detail: THERAPIES SERIES
Discharge: HOME | End: 2019-11-07
Payer: COMMERCIAL

## 2019-11-07 PROCEDURE — 97110 THERAPEUTIC EXERCISES: CPT

## 2019-11-07 PROCEDURE — 97140 MANUAL THERAPY 1/> REGIONS: CPT

## 2019-11-07 PROCEDURE — 97530 THERAPEUTIC ACTIVITIES: CPT

## 2019-11-07 PROCEDURE — 97163 PT EVAL HIGH COMPLEX 45 MIN: CPT

## 2019-11-12 ENCOUNTER — APPOINTMENT (OUTPATIENT)
Dept: PHYSICAL THERAPY | Age: 39
End: 2019-11-12
Payer: COMMERCIAL

## 2019-11-14 ENCOUNTER — HOSPITAL ENCOUNTER (OUTPATIENT)
Dept: PHYSICAL THERAPY | Age: 39
Setting detail: THERAPIES SERIES
Discharge: HOME | End: 2019-11-14
Payer: COMMERCIAL

## 2019-11-14 PROCEDURE — 97110 THERAPEUTIC EXERCISES: CPT

## 2019-11-14 PROCEDURE — 97530 THERAPEUTIC ACTIVITIES: CPT

## 2019-11-14 PROCEDURE — 97140 MANUAL THERAPY 1/> REGIONS: CPT

## 2019-11-19 ENCOUNTER — HOSPITAL ENCOUNTER (OUTPATIENT)
Dept: PHYSICAL THERAPY | Age: 39
Setting detail: THERAPIES SERIES
Discharge: HOME | End: 2019-11-19
Payer: COMMERCIAL

## 2019-11-19 ENCOUNTER — APPOINTMENT (OUTPATIENT)
Dept: PHYSICAL THERAPY | Age: 39
End: 2019-11-19
Payer: COMMERCIAL

## 2019-11-19 PROCEDURE — 97530 THERAPEUTIC ACTIVITIES: CPT

## 2019-11-19 PROCEDURE — 97110 THERAPEUTIC EXERCISES: CPT

## 2019-11-19 PROCEDURE — 97140 MANUAL THERAPY 1/> REGIONS: CPT

## 2019-11-21 ENCOUNTER — HOSPITAL ENCOUNTER (OUTPATIENT)
Dept: PHYSICAL THERAPY | Age: 39
Setting detail: THERAPIES SERIES
Discharge: HOME | End: 2019-11-21
Payer: COMMERCIAL

## 2019-11-21 PROCEDURE — 97112 NEUROMUSCULAR REEDUCATION: CPT

## 2019-11-21 PROCEDURE — 97140 MANUAL THERAPY 1/> REGIONS: CPT

## 2019-11-21 PROCEDURE — 97110 THERAPEUTIC EXERCISES: CPT

## 2019-11-21 PROCEDURE — 97530 THERAPEUTIC ACTIVITIES: CPT

## 2019-11-26 ENCOUNTER — HOSPITAL ENCOUNTER (OUTPATIENT)
Dept: PHYSICAL THERAPY | Age: 39
Setting detail: THERAPIES SERIES
Discharge: HOME | End: 2019-11-26
Payer: COMMERCIAL

## 2019-11-26 PROCEDURE — 97110 THERAPEUTIC EXERCISES: CPT

## 2019-11-26 PROCEDURE — 97112 NEUROMUSCULAR REEDUCATION: CPT

## 2019-11-26 PROCEDURE — 97530 THERAPEUTIC ACTIVITIES: CPT

## 2019-11-29 ENCOUNTER — APPOINTMENT (OUTPATIENT)
Dept: PHYSICAL THERAPY | Age: 39
End: 2019-11-29
Payer: COMMERCIAL

## 2019-12-03 ENCOUNTER — HOSPITAL ENCOUNTER (OUTPATIENT)
Dept: PHYSICAL THERAPY | Age: 39
Setting detail: THERAPIES SERIES
Discharge: HOME | End: 2019-12-03
Payer: COMMERCIAL

## 2019-12-03 PROCEDURE — 97530 THERAPEUTIC ACTIVITIES: CPT

## 2019-12-03 PROCEDURE — 97110 THERAPEUTIC EXERCISES: CPT

## 2019-12-05 ENCOUNTER — APPOINTMENT (OUTPATIENT)
Dept: PHYSICAL THERAPY | Age: 39
End: 2019-12-05
Payer: COMMERCIAL

## 2019-12-06 ENCOUNTER — HOSPITAL ENCOUNTER (OUTPATIENT)
Dept: PHYSICAL THERAPY | Age: 39
Setting detail: THERAPIES SERIES
Discharge: HOME | End: 2019-12-06
Payer: COMMERCIAL

## 2019-12-09 ENCOUNTER — HOSPITAL ENCOUNTER (OUTPATIENT)
Dept: PHYSICAL THERAPY | Age: 39
Setting detail: THERAPIES SERIES
Discharge: HOME | End: 2019-12-09
Payer: COMMERCIAL

## 2019-12-09 PROCEDURE — 97140 MANUAL THERAPY 1/> REGIONS: CPT

## 2019-12-09 PROCEDURE — 97110 THERAPEUTIC EXERCISES: CPT

## 2019-12-09 PROCEDURE — 97530 THERAPEUTIC ACTIVITIES: CPT

## 2019-12-09 PROCEDURE — 97112 NEUROMUSCULAR REEDUCATION: CPT

## 2019-12-11 ENCOUNTER — OFFICE VISIT (OUTPATIENT)
Dept: FAMILY MEDICINE CLINIC | Age: 39
End: 2019-12-11
Payer: COMMERCIAL

## 2019-12-11 ENCOUNTER — HOSPITAL ENCOUNTER (OUTPATIENT)
Dept: PHYSICAL THERAPY | Age: 39
Setting detail: THERAPIES SERIES
Discharge: HOME | End: 2019-12-11
Payer: COMMERCIAL

## 2019-12-11 VITALS
RESPIRATION RATE: 18 BRPM | WEIGHT: 139 LBS | SYSTOLIC BLOOD PRESSURE: 118 MMHG | HEIGHT: 67 IN | TEMPERATURE: 98.7 F | HEART RATE: 60 BPM | BODY MASS INDEX: 21.82 KG/M2 | DIASTOLIC BLOOD PRESSURE: 78 MMHG | OXYGEN SATURATION: 99 %

## 2019-12-11 DIAGNOSIS — M48.02 FORAMINAL STENOSIS OF CERVICAL REGION: ICD-10-CM

## 2019-12-11 DIAGNOSIS — G89.29 CHRONIC NECK PAIN: Primary | ICD-10-CM

## 2019-12-11 DIAGNOSIS — M54.2 CHRONIC NECK PAIN: Primary | ICD-10-CM

## 2019-12-11 DIAGNOSIS — G56.03 BILATERAL CARPAL TUNNEL SYNDROME: ICD-10-CM

## 2019-12-11 DIAGNOSIS — F32.A ANXIETY AND DEPRESSION: ICD-10-CM

## 2019-12-11 DIAGNOSIS — F41.9 ANXIETY AND DEPRESSION: ICD-10-CM

## 2019-12-11 PROCEDURE — 97110 THERAPEUTIC EXERCISES: CPT

## 2019-12-11 PROCEDURE — 97530 THERAPEUTIC ACTIVITIES: CPT

## 2019-12-11 PROCEDURE — G8427 DOCREV CUR MEDS BY ELIG CLIN: HCPCS | Performed by: FAMILY MEDICINE

## 2019-12-11 PROCEDURE — 97140 MANUAL THERAPY 1/> REGIONS: CPT

## 2019-12-11 PROCEDURE — G8420 CALC BMI NORM PARAMETERS: HCPCS | Performed by: FAMILY MEDICINE

## 2019-12-11 PROCEDURE — G8484 FLU IMMUNIZE NO ADMIN: HCPCS | Performed by: FAMILY MEDICINE

## 2019-12-11 PROCEDURE — 99214 OFFICE O/P EST MOD 30 MIN: CPT | Performed by: FAMILY MEDICINE

## 2019-12-11 PROCEDURE — 4004F PT TOBACCO SCREEN RCVD TLK: CPT | Performed by: FAMILY MEDICINE

## 2019-12-12 ENCOUNTER — TELEPHONE (OUTPATIENT)
Dept: ORTHOPEDIC SURGERY | Age: 39
End: 2019-12-12

## 2019-12-13 ENCOUNTER — PATIENT MESSAGE (OUTPATIENT)
Dept: FAMILY MEDICINE CLINIC | Age: 39
End: 2019-12-13

## 2019-12-13 ENCOUNTER — APPOINTMENT (OUTPATIENT)
Dept: PHYSICAL THERAPY | Age: 39
End: 2019-12-13
Payer: COMMERCIAL

## 2019-12-13 DIAGNOSIS — G89.29 CHRONIC NECK PAIN: Primary | ICD-10-CM

## 2019-12-13 DIAGNOSIS — M48.02 FORAMINAL STENOSIS OF CERVICAL REGION: ICD-10-CM

## 2019-12-13 DIAGNOSIS — M54.2 CHRONIC NECK PAIN: Primary | ICD-10-CM

## 2019-12-13 RX ORDER — NAPROXEN 500 MG/1
500 TABLET ORAL 2 TIMES DAILY WITH MEALS
Qty: 60 TABLET | Refills: 2 | Status: SHIPPED | OUTPATIENT
Start: 2019-12-13 | End: 2021-03-05 | Stop reason: SDUPTHER

## 2019-12-20 ENCOUNTER — TELEPHONE (OUTPATIENT)
Dept: ORTHOPEDIC SURGERY | Age: 39
End: 2019-12-20

## 2019-12-31 ENCOUNTER — TELEPHONE (OUTPATIENT)
Dept: ORTHOPEDIC SURGERY | Age: 39
End: 2019-12-31

## 2020-01-06 ENCOUNTER — TELEPHONE (OUTPATIENT)
Dept: ORTHOPEDIC SURGERY | Age: 40
End: 2020-01-06

## 2020-01-08 ENCOUNTER — TELEPHONE (OUTPATIENT)
Dept: ORTHOPEDIC SURGERY | Age: 40
End: 2020-01-08

## 2020-01-21 ENCOUNTER — OFFICE VISIT (OUTPATIENT)
Dept: ORTHOPEDIC SURGERY | Age: 40
End: 2020-01-21
Payer: COMMERCIAL

## 2020-01-21 VITALS
HEIGHT: 67 IN | BODY MASS INDEX: 21.8 KG/M2 | SYSTOLIC BLOOD PRESSURE: 117 MMHG | DIASTOLIC BLOOD PRESSURE: 69 MMHG | HEART RATE: 67 BPM | WEIGHT: 138.89 LBS

## 2020-01-21 PROCEDURE — G8420 CALC BMI NORM PARAMETERS: HCPCS | Performed by: PHYSICAL MEDICINE & REHABILITATION

## 2020-01-21 PROCEDURE — G8484 FLU IMMUNIZE NO ADMIN: HCPCS | Performed by: PHYSICAL MEDICINE & REHABILITATION

## 2020-01-21 PROCEDURE — G8427 DOCREV CUR MEDS BY ELIG CLIN: HCPCS | Performed by: PHYSICAL MEDICINE & REHABILITATION

## 2020-01-21 PROCEDURE — 99244 OFF/OP CNSLTJ NEW/EST MOD 40: CPT | Performed by: PHYSICAL MEDICINE & REHABILITATION

## 2020-01-21 NOTE — LETTER
Please schedule the following with:     Date:   20 @ 8:00    Account: [id number]  Patient: Yohan Montero    : 1980  Address:  [place] [id number]    Phone (H):  [phone] (home)      ----------------------------------------------------------------------------------------------  Diagnosis:     ICD-10-CM    1. Cervical radiculopathy M54.12    2. DDD (degenerative disc disease), cervical M50.30    3. Bilateral carpal tunnel syndrome G56.03          Levels:C6/7   Interlaminar CHLOE  CPT Codes 48712    ----------------------------------------------------------------------------------------------  Injection # 1   [email or web address]    Attending Physician       Marce Moreno.  Hayley Cortez MD.      ----------------------------------------------------------------------------------------------  Injection Scheduled For:    At:    1st Insurance [employer] Pre-Cert#    2nd Insurance     Pre-Cert#    Comments or Special instructions:    · Infection control  · Tested positive for MRSA in past 12 months:  no  · Tested positive for MSSA \"staph infection\" in past 12 months: no  · Tested positive for VRE (Vancomycin Resistant Enterococci) in past 12 months:   no  · Currently on any antibiotics for an infection: no  · Anticoagulants:  · On a blood thinner:  yes - naproxen  · Any history of bleeding disorder: no   · Advanced Liver disease: no   · Advanced Renal disease: no   · Glaucoma: no   · Diabetes: no     Sedation:  Yes  -----------------------------------------------------------------------------------------------  Allergies   Allergen Reactions    Bee Venom     Fentanyl      Patches - adhesive area itching    Penicillins

## 2020-01-21 NOTE — PROGRESS NOTES
New Patient: SPINE    Referring Provider:  Zenia Anguiano MD    Chief Complaint   Patient presents with    Neck Pain     NP CSP    Arm Pain     NP B/L ARMS       HISTORY OF PRESENT ILLNESS:      · The patient is being sent at the request of Zenia Anguiano MD in consultation as a new spine patient for neck pain and bilateral arm pain. The patient is a 35 y.o. female whom reports symptoms for 7 months. Symptoms progressed over the last  6 months. Patient reports there was not a significant event to cause the symptoms. Today discomfort is report at 4 out of 10, describing it as burning, aching, numbness. Symptoms are aggravated by: constant symptoms, no particular trigger. Patient has undergone recent treatment including, physical therapy, oral medications, heat, ice. Patient reports previous lumbar spine surgery. The patient denies previous cervical spine surgery. · Ms Lawrence Garcia presents today for neck pain with radiation into her bilateral shoulders, arms and hands. Patient notes her pain and symptoms as constant, however the intensity will fluctuate. She states this started and progressed fairly rapidly over the last 7 months. The patient does not feel that physical therapy helped her symptoms. · She describes increased swelling in her hands and fingers in the morning. The patient does janitorial cleaning work in the evening. She does notice fatigue and weakness with many of these activities. She also notes tightness in the shoulder region and inferior cervical region. Her symptoms do wake her up at night. This occurs typically 3-5 times per week. · The patient was also undergoing pelvic floor physical therapy due to symptoms. These started prior to her neck symptoms though. She also mentions new low back/tailbone pain along with constipation.       Pain Assessment  Location of Pain: Neck  Location Modifiers: Left, Right(ARMS)  Severity of Pain: 4  Quality of Pain: Aching(BURNING, NUMBNESS)  Duration of Pain: Persistent  Frequency of Pain: Constant  Date Pain First Started: (6/2019)  Limiting Behavior: Yes  Result of Injury: No  Work-Related Injury: No  Are there other pain locations you wish to document?: No      Associated signs and symptoms:   Neurogenic bowel or bladder symptoms:  yes   Perceived weakness:  yes   Difficulty walking:  no    Recent Imaging (within past one year)   Xrays: no   MRI or CT of spine: yes    Current/Past Treatment:   · Physical Therapy:  yes  · Chiropractic:  none  · Injection:  none  · Medications:   NSAIDS:  Naproxen   Muscle relaxer:  none   Steriods:  none   Neuropathic medications:  Gabapentin   Opioids:  none  · Previous surgery:  yes  · Previous surgical consult:  no  · Other:  · Infection control  · Tested positive for MRSA in past 12 months:  no  · Tested positive for MSSA \"staph infection\" in past 12 months: no  · Tested positive for VRE (Vancomycin Resistant Enterococci) in past 12 months:   no  · Currently on any antibiotics for an infection: no  · Anticoagulants:  · On a blood thinner:  no   · Any history of bleeding disorder: no   · MRI Contraindication: no   · Previous Pain Management: yes   · Goal for treatment - be at a point where nerve pain is better controlled and she does not have muscle fatigue  · How long can you stand? unlimited     Sit? unlimited        Walk? - unlimited                  Past Medical History:   Past Medical History:   Diagnosis Date    Bipolar disorder ([facility] [address]     Pt was dx age 11    HPV in female     Medical history reviewed with no changes     Ovarian cyst     Ovarian cyst rupture       Past Surgical History:     Past Surgical History:   Procedure Laterality Date    BACK SURGERY  06/2013    LUMBAR FUSION    CERVIX SURGERY      biopsy    SPINAL FUSION  2013    L5 S1    TUBAL LIGATION  11/2010     Current Medications:     Current Outpatient Medications:     naproxen (NAPROSYN) 500 MG tablet, Take 1 tablet by mouth 2 times daily (with meals), Disp: 60 tablet, Rfl: 2    nicotine (NICOTROL) 10 MG/ML SOLN nasal spray, 1 spray by Nasal route every hour as needed for Smoking cessation, Disp: 2 Bottle, Rfl: 5    DULoxetine (CYMBALTA) 30 MG extended release capsule, Take 1 capsule by mouth daily for 2 weeks, followed by 1 capsule BID, Disp: 60 capsule, Rfl: 3    metroNIDAZOLE (FLAGYL) 500 MG tablet, Take 500 mg by mouth 3 times daily, Disp: , Rfl:     melatonin 3 MG TABS tablet, Take 3 mg by mouth daily, Disp: , Rfl:     acetaminophen (APAP EXTRA STRENGTH) 500 MG tablet, Take 2 tablets by mouth every 6 hours as needed for Pain DO NOT TAKE WITH OTHER MEDICATIONS CONTAINING ACETAMINOPHEN., Disp: 30 tablet, Rfl: 0    polyethylene glycol (GOLYTELY) 236 g solution, Take 8 ounces every 30 minutes until stool runs clear. May stop the medicine then., Disp: 4000 mL, Rfl: 0    nicotine (NICODERM CQ) 14 MG/24HR, Place 1 patch onto the skin every 24 hours, Disp: 30 patch, Rfl: 2    gabapentin (NEURONTIN) 100 MG capsule, Take 1 capsule by mouth 3 times daily as needed (nerve pain) for up to 60 days. Intended supply: 90 days, Disp: 90 capsule, Rfl: 1  Allergies:  Bee venom; Fentanyl; and Penicillins  Social History:    reports that she has been smoking. She has been smoking about 2.00 packs per day. She has never used smokeless tobacco. She reports previous alcohol use. She reports that she does not use drugs.   Family History:   Family History   Problem Relation Age of Onset    Asthma Father     Other Father     Cancer Maternal Aunt         ovarian and cervical     Asthma Maternal Aunt     Hyperthyroidism Maternal Aunt     Cancer Maternal Uncle     Prostate Cancer Paternal Uncle     Breast Cancer Maternal Grandmother     Diabetes Maternal Grandfather     High Blood Pressure Maternal Grandfather     Prostate Cancer Paternal Grandfather          REVIEW OF SYSTEMS: Full ROS reviewed & scanned from 1/21/2020           PHYSICAL EXAM:    Vitals: Blood pressure 117/69, pulse 67, height 5' 7.01\" (1.702 m), weight 138 lb 14.2 oz (63 kg). GENERAL EXAM:  · General Apparence: Patient is adequately groomed with no evidence of malnutrition. · Psychiatric: Orientation: The patient is oriented to time, place and person. The patient's mood and affect are appropriate   · Vascular: Examination reveals no swelling and palpation reveals no tenderness in upper or lower extremities. Good capillary refill. · The lymphatic examination of the neck, axillae and groin reveals all areas to be without enlargement or induration   Sensation is intact without deficit in the upper and lower extremities to light touch and pinprick  · Coordination of the upper and lower extremities are normal.    CERVICAL EXAMINATION:  · Inspection: Local inspection shows no step-off or bruising. Cervical alignment is normal. No instability is noted. · Palpation and Percussion: No evidence of tenderness at the midline. Paraspinal tenderness is not present. There is no paraspinal spasm. · Range of Motion:  limited by 25% in all planes due to pain   · Strength: 5/5 bilateral upper extremities  · Special Tests:   Spurling's and Armendariz's are negative bilaterally. Dubon and Impingement tests are negative bilaterally. · Skin:There are no rashes, ulcerations or lesions. · Reflexes: Bilaterally triceps, biceps and brachioradialis are 2+. Clonus absent bilaterally at the feet. No pathological reflexes are noted. · Gait & station:  normal, patient ambulates without assistance and no ataxia  · Additional Examinations:  · RIGHT UPPER EXTREMITY:  Inspection/examination of the right upper extremity does not show any tenderness, deformity or injury. Range of motion is normal and pain-free. There is no gross instability. There are no rashes, ulcerations or lesions. Strength and tone are normal. No atrophy or abnormal movements are noted.   · LEFT UPPER EXTREMITY: Inspection/examination of the left upper extremity does not show any tenderness, deformity or injury. Range of motion is normal and pain-free. There is no gross instability. There are no rashes, ulcerations or lesions. Strength and tone are normal. No atrophy or abnormal movements are noted. LUMBAR/SACRAL EXAMINATION:  · Inspection: Local inspection shows no step-off or bruising. Lumbar alignment is normal. No instability is noted. · Palpation:   No evidence of tenderness at the midline. Lumbar paraspinal tenderness Mild L4/5 and L5/S1 tenderness  Bursal tenderness No tenderness bilaterally  There is no paraspinal spasm. · Range of Motion: limited by 25% in all planes due to pain  · Strength:   Strength testing is 5/5 in all muscle groups tested. · Special Tests:   Straight leg raise and crossed SLR negative. Nba's testing is negative bilaterally. FADIR's testing is negative bilaterally. · Skin: There are no rashes, ulcerations or lesions. · Reflexes: Reflexes are symmetrically 2+ at the patellar and ankle tendons. Clonus absent bilaterally at the feet. · Gait & station: normal, patient ambulates without assistance and no ataxia  · Additional Examinations:  · RIGHT LOWER EXTREMITY: Inspection/examination of the right lower extremity does not show any tenderness, deformity or injury. Range of motion is unremarkable. There is no gross instability. There are no rashes, ulcerations or lesions. Strength and tone are normal. No atrophy or abnormal movements are noted. · LEFT LOWER EXTREMITY:  Inspection/examination of the left lower extremity does not show any tenderness, deformity or injury. Range of motion is unremarkable. There is no gross instability. There are no rashes, ulcerations or lesions. Strength and tone are normal. No atrophy or abnormal movements are noted.       Diagnostic Testing:    Xrays:   None  MRI or CT:  MR cervical spine which I personally reviewed 11/5/19 -    Mild spinal canal stenosis at C4-C5 and C5-C6       Multilevel neural foraminal narrowing. EMG:  EMG 19 - mild CTS bilaterally  Results for orders placed or performed in visit on 19   C-REACTIVE PROTEIN   Result Value Ref Range    CRP <0.3 0.0 - 5.1 mg/L   SEDIMENTATION RATE   Result Value Ref Range    Sed Rate 3 0 - 20 mm/Hr   IGA   Result Value Ref Range    IgA 134.0 70.0 - 400.0 mg/dL   TISSUE TRANSGLUTAMINASE ANTOBODY IGA W/ REFLEX   Result Value Ref Range    TISSUE TRANSGLUTAMINASE IGA 0 0 - 3 U/mL       Impression (Medical Decision Making):       1. Cervical radiculopathy    2. DDD (degenerative disc disease), cervical    3. Bilateral carpal tunnel syndrome        Plan (Medical Decision Making):    I discussed the diagnosis and the treatment options with Nathan Isaacs today. In Summary:  The various treatment options were outlined and discussed with Nathan Isaacs including:  Conservative care options: physical therapy, ice, medications, bracing, and activity modification. The indications for therapeutic injections. The indications for additional imaging/laboratory studies. The indications for (possible future) interventions. After considering the various options discussed, Nathan Isaacs elected to pursue a course of treatment that includes the followin. Medications: No further recommendations for new medications. 2. PT:  Encouraged to continue with Home exercise program.    3. Further studies: Referral to hand surgery for bilateral carpal tunnel injections      4. Interventional:  We discussed pursuing a C6/7 IL epidural steroid injection to address the pain. Radiologic imaging and symptoms confirm the pain etiology. Risks, benefits and alternatives of interventional options were discussed. These include and are not limited to bleeding, infection, spinal headache, nerve injury, increased pain and lack of pain relief. The patient verbalized understanding and would like to proceed.   The patient will be scheduled accordingly. 5. Healthy Lifestyle Measures:  Patient education material reviewing the following was distributed to Sandra Pierre  Anatomic drawings  Healthy lifestyle education  Osteoporosis prevention,   Back and neck pain educational information   Advanced imaging preparedness    Posture education   Proper lifting and carrying techniques,   Weight management  Quitting smoking and   Minor ways to treat back pain  For further information regarding the spine conditions and to review interventional treatments the patient was directed to [facility].    6.  Follow up:  4-6 weeks    Sandra Pierre was instructed to call the office if her symptoms worsen or if new symptoms appear prior to the next scheduled visit. She is specifically instructed to contact the office between now & her scheduled appointment if she has concerns related to her condition or if she needs assistance in scheduling the above tests. She is welcome to call for an appointment sooner if she has any additional concerns or questions. Chanelle Hernandez ATC, am scribing for and in the presence of Dr. German Angel.   01/21/20 3:54 PM Alem Bonilla ATC    The physical examination was performed between the patient and Dr. German Angel. All counseling during the appointment was performed between the patient and provider. I, Dr. Sylvie Fuller. Raul, personally performed the services described in this documentation as scribed by LANRE Bryan in my presence and it is both accurate and complete. Jose Finn. Olga Lidia Hernández MD, CO[facility], [facility]  Board Certified in [address] [place] [address]  Certified and Fellowship Trained in [facility])     This dictation was performed with a verbal recognition program [facility]) and it was checked for errors. It is possible that there are still dictated errors within this office note.  If so, please bring any errors to my attention for an addendum. All efforts were made to ensure that this office note is accurate.

## 2020-01-24 ENCOUNTER — OFFICE VISIT (OUTPATIENT)
Dept: FAMILY MEDICINE CLINIC | Age: 40
End: 2020-01-24
Payer: COMMERCIAL

## 2020-01-24 VITALS
SYSTOLIC BLOOD PRESSURE: 118 MMHG | OXYGEN SATURATION: 96 % | HEART RATE: 76 BPM | BODY MASS INDEX: 21.61 KG/M2 | DIASTOLIC BLOOD PRESSURE: 74 MMHG | WEIGHT: 138 LBS

## 2020-01-24 PROCEDURE — 4004F PT TOBACCO SCREEN RCVD TLK: CPT | Performed by: FAMILY MEDICINE

## 2020-01-24 PROCEDURE — G8420 CALC BMI NORM PARAMETERS: HCPCS | Performed by: FAMILY MEDICINE

## 2020-01-24 PROCEDURE — G8428 CUR MEDS NOT DOCUMENT: HCPCS | Performed by: FAMILY MEDICINE

## 2020-01-24 PROCEDURE — G8484 FLU IMMUNIZE NO ADMIN: HCPCS | Performed by: FAMILY MEDICINE

## 2020-01-24 PROCEDURE — 99214 OFFICE O/P EST MOD 30 MIN: CPT | Performed by: FAMILY MEDICINE

## 2020-01-24 NOTE — PROGRESS NOTES
MD   DULoxetine (CYMBALTA) 30 MG extended release capsule Take 1 capsule by mouth daily for 2 weeks, followed by 1 capsule BID  Nahed Coronado MD   metroNIDAZOLE (FLAGYL) 500 MG tablet Take 500 mg by mouth 3 times daily  Historical Provider, MD   melatonin 3 MG TABS tablet Take 3 mg by mouth daily  Historical Provider, MD   nicotine (NICODERM CQ) 14 MG/24HR Place 1 patch onto the skin every 24 hours  Nahed Coronado MD   acetaminophen (APAP EXTRA STRENGTH) 500 MG tablet Take 2 tablets by mouth every 6 hours as needed for Pain DO NOT TAKE WITH OTHER MEDICATIONS CONTAINING ACETAMINOPHEN. TeoJUSTIN Miller   polyethylene glycol (GOLYTELY) 236 g solution Take 8 ounces every 30 minutes until stool runs clear. May stop the medicine then. JUSTIN Moran   gabapentin (NEURONTIN) 100 MG capsule Take 1 capsule by mouth 3 times daily as needed (nerve pain) for up to 60 days.  Intended supply: 80 days  Nahed Coronado MD     Health Maintenance   Topic Date Due    Pneumococcal 0-64 years Vaccine (1 of 1 - PPSV23) 11/17/1985    DTaP/Tdap/Td vaccine (1 - Tdap) 11/17/1990    HIV screen  11/17/1994    Cervical cancer screen  11/17/2000    Flu vaccine (1) 09/01/2019    Lipid screen  11/17/2019     Past Medical History:   Diagnosis Date    Bipolar disorder ([facility] [address]     Pt was dx age 11    HPV in female     Medical history reviewed with no changes     Ovarian cyst     Ovarian cyst rupture      Social History     Socioeconomic History    Marital status:      Spouse name: None    Number of children: None    Years of education: None    Highest education level: None   Occupational History    None   Social Needs    Financial resource strain: None    Food insecurity:     Worry: None     Inability: None    Transportation needs:     Medical: None     Non-medical: None   Tobacco Use    Smoking status: Current Every Day Smoker     Packs/day: 2.00    Smokeless tobacco: Never Used    Tobacco comment: pt smokes on average half a pack to one pack a day    Substance and Sexual Activity    Alcohol use: Not Currently     Comment: very rare    Drug use: Never    Sexual activity: None   Lifestyle    Physical activity:     Days per week: None     Minutes per session: None    Stress: None   Relationships    Social connections:     Talks on phone: None     Gets together: None     Attends Anabaptism service: None     Active member of club or organization: None     Attends meetings of clubs or organizations: None     Relationship status: None    Intimate partner violence:     Fear of current or ex partner: None     Emotionally abused: None     Physically abused: None     Forced sexual activity: None   Other Topics Concern    None   Social History Narrative    Recently moved from [facility], [facility] [address] her to be with her father, Casimiro Le (patient here) to care for him with hospice due to his cancer    - taking the summer off to care for her father, and she will consider starting back up after Hospice    - has 3 children ages 25, 8, and 8     Allergies   Allergen Reactions    Bee Venom     Fentanyl      Patches - adhesive area itching    Penicillins        LABS:  Lab Results   Component Value Date    GLUCOSE 113 (H) 07/02/2019     Lab Results   Component Value Date     07/02/2019    K 4.6 07/02/2019    CREATININE 0.6 07/02/2019     No results found for: CHOL, LDLCALCNo results found for: HDLNo results found for: TRIG  Lab Results   Component Value Date    ALT 9 (L) 07/02/2019    AST 12 (L) 07/02/2019    ALKPHOS 41 07/02/2019    BILITOT <0.2 07/02/2019     Lab Results   Component Value Date    WBC 8.4 07/02/2019    HGB 14.1 07/02/2019    HCT 41.8 07/02/2019    MCV 91.8 07/02/2019     07/02/2019     TSH (uIU/mL)   Date Value   06/14/2019 0.45     No results found for: LABA1C  No results found for: PSA, PSADIA      PHYSICAL EXAM  /74 (Site: Right Upper Arm, Position: Sitting, Cuff Size: Large Adult)   Pulse 76   Wt 138 lb

## 2020-01-27 ENCOUNTER — HOSPITAL ENCOUNTER (OUTPATIENT)
Dept: GENERAL RADIOLOGY | Age: 40
Discharge: HOME | End: 2020-01-27
Payer: COMMERCIAL

## 2020-01-27 ENCOUNTER — HOSPITAL ENCOUNTER (OUTPATIENT)
Age: 40
Discharge: HOME | End: 2020-01-27
Payer: COMMERCIAL

## 2020-01-27 PROCEDURE — 72220 X-RAY EXAM SACRUM TAILBONE: CPT

## 2020-01-27 ASSESSMENT — ENCOUNTER SYMPTOMS
COUGH: 0
SHORTNESS OF BREATH: 0

## 2020-02-04 ENCOUNTER — TELEPHONE (OUTPATIENT)
Dept: ORTHOPEDIC SURGERY | Age: 40
End: 2020-02-04

## 2020-02-18 ENCOUNTER — HOSPITAL ENCOUNTER (OUTPATIENT)
Age: 40
Setting detail: OUTPATIENT SURGERY
Discharge: HOME | End: 2020-02-18
Attending: PHYSICAL MEDICINE & REHABILITATION | Admitting: PHYSICAL MEDICINE & REHABILITATION
Payer: COMMERCIAL

## 2020-02-18 VITALS
TEMPERATURE: 98.1 F | SYSTOLIC BLOOD PRESSURE: 99 MMHG | DIASTOLIC BLOOD PRESSURE: 57 MMHG | RESPIRATION RATE: 12 BRPM | OXYGEN SATURATION: 100 % | HEART RATE: 58 BPM

## 2020-02-18 LAB — PREGNANCY, URINE: NEGATIVE

## 2020-02-18 PROCEDURE — 84703 CHORIONIC GONADOTROPIN ASSAY: CPT

## 2020-02-18 PROCEDURE — 2709999900 HC NON-CHARGEABLE SUPPLY: Performed by: PHYSICAL MEDICINE & REHABILITATION

## 2020-02-18 PROCEDURE — 3600000012 HC SURGERY LEVEL 2 ADDTL 15MIN: Performed by: PHYSICAL MEDICINE & REHABILITATION

## 2020-02-18 PROCEDURE — 3600000002 HC SURGERY LEVEL 2 BASE: Performed by: PHYSICAL MEDICINE & REHABILITATION

## 2020-02-18 PROCEDURE — 6360000002 HC RX W HCPCS: Performed by: PHYSICAL MEDICINE & REHABILITATION

## 2020-02-18 PROCEDURE — 7100000011 HC PHASE II RECOVERY - ADDTL 15 MIN: Performed by: PHYSICAL MEDICINE & REHABILITATION

## 2020-02-18 PROCEDURE — 99152 MOD SED SAME PHYS/QHP 5/>YRS: CPT | Performed by: PHYSICAL MEDICINE & REHABILITATION

## 2020-02-18 PROCEDURE — 2500000003 HC RX 250 WO HCPCS: Performed by: PHYSICAL MEDICINE & REHABILITATION

## 2020-02-18 PROCEDURE — 2580000003 HC RX 258: Performed by: PHYSICAL MEDICINE & REHABILITATION

## 2020-02-18 PROCEDURE — 7100000010 HC PHASE II RECOVERY - FIRST 15 MIN: Performed by: PHYSICAL MEDICINE & REHABILITATION

## 2020-02-18 RX ORDER — LIDOCAINE HYDROCHLORIDE 10 MG/ML
INJECTION, SOLUTION EPIDURAL; INFILTRATION; INTRACAUDAL; PERINEURAL PRN
Status: DISCONTINUED | OUTPATIENT
Start: 2020-02-18 | End: 2020-02-18 | Stop reason: ALTCHOICE

## 2020-02-18 RX ORDER — SODIUM CHLORIDE, SODIUM LACTATE, POTASSIUM CHLORIDE, CALCIUM CHLORIDE 600; 310; 30; 20 MG/100ML; MG/100ML; MG/100ML; MG/100ML
INJECTION, SOLUTION INTRAVENOUS CONTINUOUS
Status: DISCONTINUED | OUTPATIENT
Start: 2020-02-18 | End: 2020-02-18 | Stop reason: HOSPADM

## 2020-02-18 RX ORDER — SODIUM CHLORIDE 9 MG/ML
INJECTION INTRAVENOUS PRN
Status: DISCONTINUED | OUTPATIENT
Start: 2020-02-18 | End: 2020-02-18 | Stop reason: ALTCHOICE

## 2020-02-18 RX ORDER — DEXAMETHASONE SODIUM PHOSPHATE 10 MG/ML
INJECTION, SOLUTION INTRAMUSCULAR; INTRAVENOUS PRN
Status: DISCONTINUED | OUTPATIENT
Start: 2020-02-18 | End: 2020-02-18 | Stop reason: ALTCHOICE

## 2020-02-18 RX ORDER — SENNA PLUS 8.6 MG/1
1 TABLET ORAL DAILY
COMMUNITY

## 2020-02-18 RX ORDER — MIDAZOLAM HYDROCHLORIDE 1 MG/ML
INJECTION INTRAMUSCULAR; INTRAVENOUS PRN
Status: DISCONTINUED | OUTPATIENT
Start: 2020-02-18 | End: 2020-02-18 | Stop reason: ALTCHOICE

## 2020-02-18 RX ORDER — DOCUSATE SODIUM 100 MG/1
100 CAPSULE, LIQUID FILLED ORAL 2 TIMES DAILY PRN
COMMUNITY

## 2020-02-18 RX ADMIN — LIDOCAINE HYDROCHLORIDE 0.1 ML: 10 INJECTION, SOLUTION EPIDURAL; INFILTRATION; INTRACAUDAL; PERINEURAL at 08:04

## 2020-02-18 RX ADMIN — SODIUM CHLORIDE, POTASSIUM CHLORIDE, SODIUM LACTATE AND CALCIUM CHLORIDE: 600; 310; 30; 20 INJECTION, SOLUTION INTRAVENOUS at 08:03

## 2020-02-18 NOTE — H&P
Historical Provider, MD   nicotine (NICODERM CQ) 14 MG/24HR Place 1 patch onto the skin every 24 hours 8/1/19 10/30/19  Jonny Coronado MD   acetaminophen (APAP EXTRA STRENGTH) 500 MG tablet Take 2 tablets by mouth every 6 hours as needed for Pain DO NOT TAKE WITH OTHER MEDICATIONS CONTAINING ACETAMINOPHEN. 7/2/19   Teo JUSTIN Lockett   polyethylene glycol (GOLYTELY) 236 g solution Take 8 ounces every 30 minutes until stool runs clear. May stop the medicine then. 7/2/19   Teo JUSTIN Padilla     Allergies:  Bee venom; Fentanyl; and Penicillins  Social History:    reports that she has been smoking. She has been smoking about 0.50 packs per day. She has never used smokeless tobacco. She reports previous alcohol use. She reports that she does not use drugs. Family History:   Family History   Problem Relation Age of Onset    Asthma Father     Other Father     Cancer Maternal Aunt         ovarian and cervical     Asthma Maternal Aunt     Hyperthyroidism Maternal Aunt     Cancer Maternal Uncle     Prostate Cancer Paternal Uncle     Breast Cancer Maternal Grandmother     Diabetes Maternal Grandfather     High Blood Pressure Maternal Grandfather     Prostate Cancer Paternal Grandfather        Vitals: Blood pressure (!) 91/59, pulse 58, temperature 98.1 °F (36.7 °C), temperature source Temporal, resp. rate 14, last menstrual period 02/13/2020, SpO2 98 %. PHYSICAL EXAM:  HENT: Airway patent and reviewed  Cardiovascular: Normal rate, regular rhythm, normal heart sounds. Pulmonary/Chest: No wheezes. No rhonchi. No rales. Abdominal: Soft. Bowel sounds are normal. No distension. Extremities: Moves all extremities equally  Lumbar Spine: Painful range of motion, no midline tenderness       Diagnosis:Cervical radiculopathy     Plan: Proceed with planned procedure      ASA CLASS:         []   I. Normal, healthy adult           [x]   II.  Mild systemic disease            []   III.   Severe systemic disease      Mallampati: Mallampati Class II - (soft palate, fauces & uvula are visible)      Sedation plan:   [x]  Local              []  Minimal                  []  General anesthesia    Patient's condition acceptable for planned procedure/sedation. Post Procedure Plan   Return to same level of care   ______________________     The risks and benefits as well as alternatives to the procedure have been discussed with the patient and or family. The patient and or next of kin understands and agrees to proceed.     Stanley Hughes M.D.

## 2020-03-26 ENCOUNTER — TELEMEDICINE (OUTPATIENT)
Dept: FAMILY MEDICINE CLINIC | Age: 40
End: 2020-03-26
Payer: COMMERCIAL

## 2020-03-26 PROBLEM — G89.29 CHRONIC NECK PAIN: Status: ACTIVE | Noted: 2020-03-26

## 2020-03-26 PROBLEM — M54.2 CHRONIC NECK PAIN: Status: ACTIVE | Noted: 2020-03-26

## 2020-03-26 PROCEDURE — 99214 OFFICE O/P EST MOD 30 MIN: CPT | Performed by: FAMILY MEDICINE

## 2020-03-26 PROCEDURE — G8427 DOCREV CUR MEDS BY ELIG CLIN: HCPCS | Performed by: FAMILY MEDICINE

## 2020-03-26 RX ORDER — NORTRIPTYLINE HYDROCHLORIDE 10 MG/1
10 CAPSULE ORAL NIGHTLY
Qty: 30 CAPSULE | Refills: 3 | Status: SHIPPED | OUTPATIENT
Start: 2020-03-26 | End: 2020-05-12

## 2020-03-26 NOTE — PROGRESS NOTES
Historical Provider, MD   naproxen (NAPROSYN) 500 MG tablet Take 1 tablet by mouth 2 times daily (with meals) Yes Lluvia Coronado MD   nicotine (NICOTROL) 10 MG/ML SOLN nasal spray 1 spray by Nasal route every hour as needed for Smoking cessation Yes Lluvia Coronado MD   DULoxetine (CYMBALTA) 30 MG extended release capsule Take 1 capsule by mouth daily for 2 weeks, followed by 1 capsule BID Yes Lluvia Coronado MD   melatonin 3 MG TABS tablet Take 3 mg by mouth daily Yes Historical Provider, MD   acetaminophen (APAP EXTRA STRENGTH) 500 MG tablet Take 2 tablets by mouth every 6 hours as needed for Pain DO NOT TAKE WITH OTHER MEDICATIONS CONTAINING ACETAMINOPHEN. Yes Teo Abhilash Speaks, PA   nicotine (NICODERM CQ) 14 MG/24HR Place 1 patch onto the skin every 24 hours  Lluvia Coronado MD   gabapentin (NEURONTIN) 100 MG capsule Take 1 capsule by mouth 3 times daily as needed (nerve pain) for up to 60 days.  Intended supply: 80 days  Lluvia Coronado MD     Health Maintenance   Topic Date Due    Pneumococcal 0-64 years Vaccine (1 of 1 - PPSV23) 11/17/1985    HIV screen  11/17/1994    DTaP/Tdap/Td vaccine (1 - Tdap) 11/17/1998    Cervical cancer screen  11/17/2000    Flu vaccine (1) 09/01/2019    Lipid screen  11/17/2019    Hepatitis A vaccine  Aged Out    Hepatitis B vaccine  Aged Out    Hib vaccine  Aged Out    Meningococcal (ACWY) vaccine  Aged Out     Past Medical History:   Diagnosis Date    Bipolar disorder ([facility] [address]     Pt was dx age 11    HPV in female     Medical history reviewed with no changes     Ovarian cyst     Ovarian cyst rupture      Social History     Socioeconomic History    Marital status:      Spouse name: Not on file    Number of children: Not on file    Years of education: Not on file    Highest education level: Not on file   Occupational History    Not on file   Social Needs    Financial resource strain: Not on file    Food insecurity     Worry: Not on file     Inability: Not on file   [facility] Transportation needs     Medical: Not on file     Non-medical: Not on file   Tobacco Use    Smoking status: Current Every Day Smoker     Packs/day: 0.50    Smokeless tobacco: Never Used    Tobacco comment: pt smokes on average half a pack to one pack a day    Substance and Sexual Activity    Alcohol use: Not Currently     Comment: very rare    Drug use: Never    Sexual activity: Not on file   Lifestyle    Physical activity     Days per week: Not on file     Minutes per session: Not on file    Stress: Not on file   Relationships    Social connections     Talks on phone: Not on file     Gets together: Not on file     Attends Alevism service: Not on file     Active member of club or organization: Not on file     Attends meetings of clubs or organizations: Not on file     Relationship status: Not on file    Intimate partner violence     Fear of current or ex partner: Not on file     Emotionally abused: Not on file     Physically abused: Not on file     Forced sexual activity: Not on file   Other Topics Concern    Not on file   Social History Narrative    Recently moved from [facility], [address] her to be with her father, Wade Flores (patient here) to care for him with hospice due to his cancer    - taking the summer off to care for her father, and she will consider starting back up after Hospice    - has 3 children ages 25, 8, and 8     Allergies   Allergen Reactions    Bee Venom     Fentanyl      Patches - adhesive area itching    Penicillins        LABS:  Lab Results   Component Value Date    GLUCOSE 113 (H) 07/02/2019     Lab Results   Component Value Date     07/02/2019    K 4.6 07/02/2019    CREATININE 0.6 07/02/2019     No results found for: CHOL, LDLCALCNo results found for: HDLNo results found for: TRIG  Lab Results   Component Value Date    ALT 9 (L) 07/02/2019    AST 12 (L) 07/02/2019    ALKPHOS 41 07/02/2019    BILITOT <0.2 07/02/2019     Lab Results   Component Value Date    WBC 8.4

## 2020-05-12 ENCOUNTER — PATIENT MESSAGE (OUTPATIENT)
Dept: FAMILY MEDICINE CLINIC | Age: 40
End: 2020-05-12

## 2020-05-12 RX ORDER — NORTRIPTYLINE HYDROCHLORIDE 25 MG/1
25 CAPSULE ORAL NIGHTLY
Qty: 90 CAPSULE | Refills: 1 | Status: SHIPPED | OUTPATIENT
Start: 2020-05-12 | End: 2020-12-24

## 2020-07-15 ENCOUNTER — PATIENT MESSAGE (OUTPATIENT)
Dept: FAMILY MEDICINE CLINIC | Age: 40
End: 2020-07-15

## 2020-07-16 ENCOUNTER — OFFICE VISIT (OUTPATIENT)
Dept: FAMILY MEDICINE CLINIC | Age: 40
End: 2020-07-16
Payer: COMMERCIAL

## 2020-07-16 VITALS
HEART RATE: 112 BPM | WEIGHT: 136.6 LBS | BODY MASS INDEX: 21.44 KG/M2 | DIASTOLIC BLOOD PRESSURE: 74 MMHG | SYSTOLIC BLOOD PRESSURE: 120 MMHG | HEIGHT: 67 IN | RESPIRATION RATE: 18 BRPM | OXYGEN SATURATION: 95 % | TEMPERATURE: 97.7 F

## 2020-07-16 PROCEDURE — G8427 DOCREV CUR MEDS BY ELIG CLIN: HCPCS | Performed by: FAMILY MEDICINE

## 2020-07-16 PROCEDURE — G8420 CALC BMI NORM PARAMETERS: HCPCS | Performed by: FAMILY MEDICINE

## 2020-07-16 PROCEDURE — 4004F PT TOBACCO SCREEN RCVD TLK: CPT | Performed by: FAMILY MEDICINE

## 2020-07-16 PROCEDURE — 99214 OFFICE O/P EST MOD 30 MIN: CPT | Performed by: FAMILY MEDICINE

## 2020-07-16 RX ORDER — HYDROCORTISONE 25 MG/ML
LOTION TOPICAL
Qty: 1 BOTTLE | Refills: 1 | Status: SHIPPED | OUTPATIENT
Start: 2020-07-16 | End: 2020-12-24

## 2020-07-16 NOTE — PROGRESS NOTES
7/16/2020    This is a 35 y.o. female   Chief Complaint   Patient presents with    Constipation     x6 days. 2 bm since friday until 3rd one today. stool is loose and dark. very foul. mucous. burning, itching and red bumps near rectum. bloating mostly in evening     HPI   Pt with intermittent previous issues with constipation / IBS, now with recent change in stools. - last week, had a day with a lot of rectal pressure and discomfort and was only able to have a couple of small, hard BMs. The next few days was unable to have a BM. She then started Senna and colace. Then, started having BMs that were loose but still noted rectal pressure and discomfort  - some BMs have had mucus and looked stringy  - she denies abdominal pain besides mild bloating  - no blood in stool or with wiping  - now has noted anal itching and burning that is uncomfortable and is worried about some bumps that might be there. Epsom salt bath was somewhat helpful. Trying to use wet wipes and OTC diaper cream for relief    - in the past she was on Cymbalta for mood/chronic pain and nortriptyline at night for IBS.  Stopped two weeks ago due to bad dreams.  - no prior colonoscopy    Review of Systems   As per HPI, otherwise negative    Past Medical History:   Diagnosis Date    Bipolar disorder ([facility] [address]     Pt was dx age 11    HPV in female     Medical history reviewed with no changes     Ovarian cyst     Ovarian cyst rupture        Past Surgical History:   Procedure Laterality Date    BACK SURGERY  06/2013    LUMBAR FUSION    CERVIX SURGERY      biopsy    PAIN MANAGEMENT PROCEDURE N/A 2/18/2020    CERVICAL SIX SEVEN INTERLAMINAR EPIDURAL STEROID INJECTION SITE CONFIRMED BY FLUOROSCOPY performed by Nan Green MD at [address] [place] [address]  2013    L5 S1    TUBAL LIGATION  11/2010       Family History   Problem Relation Age of Onset    Asthma Father     Other Father     Cancer Maternal Aunt         ovarian and cervical     Asthma Maternal Aunt     Hyperthyroidism Maternal Aunt     Cancer Maternal Uncle     Prostate Cancer Paternal Uncle     Breast Cancer Maternal Grandmother     Diabetes Maternal Grandfather     High Blood Pressure Maternal Grandfather     Prostate Cancer Paternal Grandfather        Current Outpatient Medications   Medication Sig Dispense Refill    hydrocortisone (HYTONE) 2.5 % lotion Apply topically 2 times daily for no more than 15 consecutive days 1 Bottle 1    docusate sodium (COLACE) 100 MG capsule Take 100 mg by mouth 2 times daily      senna (SENOKOT) 8.6 MG tablet Take 1 tablet by mouth daily      naproxen (NAPROSYN) 500 MG tablet Take 1 tablet by mouth 2 times daily (with meals) 60 tablet 2    nicotine (NICOTROL) 10 MG/ML SOLN nasal spray 1 spray by Nasal route every hour as needed for Smoking cessation 2 Bottle 5    melatonin 3 MG TABS tablet Take 3 mg by mouth daily      acetaminophen (APAP EXTRA STRENGTH) 500 MG tablet Take 2 tablets by mouth every 6 hours as needed for Pain DO NOT TAKE WITH OTHER MEDICATIONS CONTAINING ACETAMINOPHEN. 30 tablet 0    nortriptyline (PAMELOR) 25 MG capsule Take 1 capsule by mouth nightly 90 capsule 1    DULoxetine (CYMBALTA) 30 MG extended release capsule Take 1 capsule by mouth daily for 2 weeks, followed by 1 capsule BID 60 capsule 3    nicotine (NICODERM CQ) 14 MG/24HR Place 1 patch onto the skin every 24 hours 30 patch 2    gabapentin (NEURONTIN) 100 MG capsule Take 1 capsule by mouth 3 times daily as needed (nerve pain) for up to 60 days. Intended supply: 90 days 90 capsule 1     No current facility-administered medications for this visit.         /74 (Site: Right Upper Arm, Position: Sitting, Cuff Size: Medium Adult)   Pulse 112   Temp 97.7 °F (36.5 °C) (Temporal)   Resp 18   Ht 5' 7\" (1.702 m)   Wt 136 lb 9.6 oz (62 kg)   LMP 06/22/2020   SpO2 95%   BMI 21.39 kg/m²     Physical Exam  Constitutional:       Appearance: She is well-developed. HENT:      Head: Normocephalic and atraumatic. Pulmonary:      Effort: Pulmonary effort is normal.   Abdominal:      General: There is no distension. Tenderness: There is no abdominal tenderness. There is no guarding or rebound. Genitourinary:     Comments: LEROY Jay present for exam  Mild anal irritation, no lesions, fissures  Skin:     Coloration: Skin is not pale. Neurological:      Mental Status: She is alert and oriented to person, place, and time. Wt Readings from Last 3 Encounters:   07/16/20 136 lb 9.6 oz (62 kg)   01/24/20 138 lb (62.6 kg)   01/21/20 138 lb 14.2 oz (63 kg)       BP Readings from Last 3 Encounters:   07/16/20 120/74   02/18/20 (!) 99/57   01/24/20 118/74         Assessment/Plan:  1. Change in stool habits  Chronic symptoms consistent with IBS. Check KUB to assess stool burden. We discussed use of Colace. If not improving consider colonoscopy  - Susan Mckeon MD, Gastroenterology, [facility]  - XR ABDOMEN (KUB) (SINGLE AP VIEW); Future    2. Anal irritation  No fissures or lesions seen. Apply hydrocortisone  - hydrocortisone (HYTONE) 2.5 % lotion; Apply topically 2 times daily for no more than 15 consecutive days  Dispense: 1 Bottle; Refill: 1    3. Constipation, unspecified constipation type  As above  - AFL - Harsh Valera MD, Gastroenterology, [facility]  - XR ABDOMEN (KUB) (SINGLE AP VIEW);  Future        If symptoms worsen or fail to improve, schedule colonoscopy

## 2020-11-30 ENCOUNTER — TELEMEDICINE (OUTPATIENT)
Dept: FAMILY MEDICINE CLINIC | Age: 40
End: 2020-11-30
Payer: COMMERCIAL

## 2020-11-30 PROCEDURE — G8427 DOCREV CUR MEDS BY ELIG CLIN: HCPCS | Performed by: FAMILY MEDICINE

## 2020-11-30 PROCEDURE — 99214 OFFICE O/P EST MOD 30 MIN: CPT | Performed by: FAMILY MEDICINE

## 2020-11-30 RX ORDER — PREDNISONE 10 MG/1
TABLET ORAL
Qty: 20 TABLET | Refills: 0 | Status: SHIPPED | OUTPATIENT
Start: 2020-11-30 | End: 2020-12-24

## 2020-11-30 RX ORDER — CLINDAMYCIN HYDROCHLORIDE 300 MG/1
300 CAPSULE ORAL 3 TIMES DAILY
Qty: 15 CAPSULE | Refills: 0 | Status: SHIPPED | OUTPATIENT
Start: 2020-11-30 | End: 2020-12-05

## 2020-11-30 RX ORDER — TIZANIDINE 2 MG/1
2 TABLET ORAL NIGHTLY PRN
Qty: 10 TABLET | Refills: 0 | Status: SHIPPED | OUTPATIENT
Start: 2020-11-30 | End: 2022-10-12 | Stop reason: SDUPTHER

## 2020-11-30 NOTE — PROGRESS NOTES
11/30/2020    This is a 38 y.o. female   Chief Complaint   Patient presents with    Headache     pain at base of skull.  Otalgia     left ear pain X yesterday     HPI  Pt with concerns for a headache  - her symptoms started with jaw and tooth pain on her L side about 2 weeks ago. She went to a dentist and was scheduled to have 3 teeth extracted. This procedure was unable to be performed due to difficulty being anesthetized. She went to a different dentist and was started on antibiotics for concern for infection.  - She then began having headaches, daily, in jaw, base of skull and frontal sinus area. Notes pain and tension in neck  - for her second dental procedure attempt, there was again difficulty getting the nerve block she needed and IV sedation for this procedure was recommended  - however, since this she has had severe headaches on the left side of her head (same side as dental issue)     She had a family member she saw on [facility] that called 2 days later with a positive test.    She does endorse over the past day feeling a little bit achey, congested, mild sore throat, no fever.     Review of Systems   As per HPI, otherwise negative    Past Medical History:   Diagnosis Date    Bipolar disorder ([facility] [address]     Pt was dx age 14    HPV in female     Medical history reviewed with no changes     Ovarian cyst     Ovarian cyst rupture        Past Surgical History:   Procedure Laterality Date    BACK SURGERY  06/2013    LUMBAR FUSION    CERVIX SURGERY      biopsy    PAIN MANAGEMENT PROCEDURE N/A 2/18/2020    CERVICAL SIX SEVEN INTERLAMINAR EPIDURAL STEROID INJECTION SITE CONFIRMED BY FLUOROSCOPY performed by Barbara Bhatia MD at [facility]  2013    L5 S1    TUBAL LIGATION  11/2010       Family History   Problem Relation Age of Onset    Asthma Father     Other Father     Cancer Maternal Aunt         ovarian and cervical     Asthma Maternal Aunt     Hyperthyroidism Maternal Aunt     Cancer Maternal Uncle     Prostate Cancer Paternal Uncle     Breast Cancer Maternal Grandmother     Diabetes Maternal Grandfather     High Blood Pressure Maternal Grandfather     Prostate Cancer Paternal Grandfather        Current Outpatient Medications   Medication Sig Dispense Refill    clindamycin (CLEOCIN) 300 MG capsule Take 1 capsule by mouth 3 times daily for 5 days 15 capsule 0    predniSONE (DELTASONE) 10 MG tablet Take 4 tabs by mouth daily for 2 days, 3 tabs for 2 days, 2 tabs for 2 days, 1 tab for 2 days 20 tablet 0    naproxen (NAPROSYN) 500 MG tablet Take 1 tablet by mouth 2 times daily (with meals) 60 tablet 2    melatonin 3 MG TABS tablet Take 3 mg by mouth daily      acetaminophen (APAP EXTRA STRENGTH) 500 MG tablet Take 2 tablets by mouth every 6 hours as needed for Pain DO NOT TAKE WITH OTHER MEDICATIONS CONTAINING ACETAMINOPHEN. 30 tablet 0    hydrocortisone (HYTONE) 2.5 % lotion Apply topically 2 times daily for no more than 15 consecutive days (Patient not taking: Reported on 11/30/2020) 1 Bottle 1    nortriptyline (PAMELOR) 25 MG capsule Take 1 capsule by mouth nightly 90 capsule 1    docusate sodium (COLACE) 100 MG capsule Take 100 mg by mouth 2 times daily      senna (SENOKOT) 8.6 MG tablet Take 1 tablet by mouth daily      nicotine (NICOTROL) 10 MG/ML SOLN nasal spray 1 spray by Nasal route every hour as needed for Smoking cessation (Patient not taking: Reported on 11/30/2020) 2 Bottle 5    DULoxetine (CYMBALTA) 30 MG extended release capsule Take 1 capsule by mouth daily for 2 weeks, followed by 1 capsule BID 60 capsule 3    nicotine (NICODERM CQ) 14 MG/24HR Place 1 patch onto the skin every 24 hours 30 patch 2    gabapentin (NEURONTIN) 100 MG capsule Take 1 capsule by mouth 3 times daily as needed (nerve pain) for up to 60 days. Intended supply: 90 days 90 capsule 1     No current facility-administered medications for this visit.         There were no vitals taken for this visit. Physical Exam  Constitutional:       Appearance: She is well-developed. HENT:      Head: Normocephalic and atraumatic. Pulmonary:      Effort: Pulmonary effort is normal.   Skin:     Coloration: Skin is not pale. Neurological:      Mental Status: She is alert and oriented to person, place, and time. Wt Readings from Last 3 Encounters:   07/16/20 136 lb 9.6 oz (62 kg)   01/24/20 138 lb (62.6 kg)   01/21/20 138 lb 14.2 oz (63 kg)     BP Readings from Last 3 Encounters:   07/16/20 120/74   02/18/20 (!) 99/57   01/24/20 118/74     Assessment/Plan:  1. Acute nonintractable headache, unspecified headache type  - predniSONE (DELTASONE) 10 MG tablet; Take 4 tabs by mouth daily for 2 days, 3 tabs for 2 days, 2 tabs for 2 days, 1 tab for 2 days  Dispense: 20 tablet; Refill: 0    2. Dental infection  - clindamycin (CLEOCIN) 300 MG capsule; Take 1 capsule by mouth 3 times daily for 5 days  Dispense: 15 capsule; Refill: 0    Headache on side of dental infection with symptoms described as likely tension related with neck and occipital symptoms and possible analgesia rebound as well. Start muscle relaxer (night only) and prednisone taper. She is looking into a place that offers IV sedation for her dental infection  Based on symptoms and recent exposure advised COVID testing and isolating at home until results are back. Call for worsening symptoms    Amelie Hodge is a 38 y.o. female being evaluated by a Virtual Visit (video visit) encounter to address concerns as mentioned above. A caregiver was present when appropriate. Due to this being a TeleHealth encounter (During EKZ-73 public health emergency), evaluation of the following organ systems was limited: Vitals/Constitutional/EENT/Resp/CV/GI//MS/Neuro/Skin/Heme-Lymph-Imm.   Pursuant to the emergency declaration under the [address] and Response Supplemental Appropriations Act, this Virtual Visit was conducted with patient's (and/or legal guardian's) consent, to reduce the patient's risk of exposure to COVID-19 and provide necessary medical care. The patient (and/or legal guardian) has also been advised to contact this office for worsening conditions or problems, and seek emergency medical treatment and/or call 911 if deemed necessary. Patient identification was verified at the start of the visit: yes    Total time spent for this encounter: 31 minutes    Services were provided through a video synchronous discussion virtually to substitute for in-person clinic visit. Patient and provider were located at their individual homes. --Jacqui Ayoub MD on 11/30/2020 at 1:44 PM    An electronic signature was used to authenticate this note.

## 2020-12-02 ENCOUNTER — OFFICE VISIT (OUTPATIENT)
Dept: PRIMARY CARE CLINIC | Age: 40
End: 2020-12-02
Payer: COMMERCIAL

## 2020-12-02 PROCEDURE — G8428 CUR MEDS NOT DOCUMENT: HCPCS | Performed by: NURSE PRACTITIONER

## 2020-12-02 PROCEDURE — G8420 CALC BMI NORM PARAMETERS: HCPCS | Performed by: NURSE PRACTITIONER

## 2020-12-02 PROCEDURE — 99211 OFF/OP EST MAY X REQ PHY/QHP: CPT | Performed by: NURSE PRACTITIONER

## 2020-12-02 NOTE — PROGRESS NOTES
Eleonore Points received a viral test for COVID-19. They were educated on isolation and quarantine as appropriate. For any symptoms, they were directed to seek care from their PCP, given contact information to establish with a doctor, directed to an urgent care or the emergency room.

## 2020-12-04 LAB — SARS-COV-2, NAA: DETECTED

## 2020-12-07 ENCOUNTER — PATIENT MESSAGE (OUTPATIENT)
Dept: FAMILY MEDICINE CLINIC | Age: 40
End: 2020-12-07

## 2020-12-08 RX ORDER — DOXYCYCLINE HYCLATE 100 MG
100 TABLET ORAL 2 TIMES DAILY
Qty: 14 TABLET | Refills: 0 | Status: SHIPPED | OUTPATIENT
Start: 2020-12-08 | End: 2020-12-15

## 2020-12-11 ENCOUNTER — TELEPHONE (OUTPATIENT)
Dept: FAMILY MEDICINE CLINIC | Age: 40
End: 2020-12-11

## 2020-12-11 ENCOUNTER — APPOINTMENT (OUTPATIENT)
Dept: GENERAL RADIOLOGY | Age: 40
End: 2020-12-11
Payer: COMMERCIAL

## 2020-12-11 ENCOUNTER — APPOINTMENT (OUTPATIENT)
Dept: CT IMAGING | Age: 40
End: 2020-12-11
Payer: COMMERCIAL

## 2020-12-11 ENCOUNTER — HOSPITAL ENCOUNTER (EMERGENCY)
Age: 40
Discharge: HOME | End: 2020-12-11
Payer: COMMERCIAL

## 2020-12-11 VITALS
DIASTOLIC BLOOD PRESSURE: 59 MMHG | RESPIRATION RATE: 14 BRPM | WEIGHT: 140.87 LBS | OXYGEN SATURATION: 100 % | TEMPERATURE: 98.1 F | BODY MASS INDEX: 22.11 KG/M2 | HEART RATE: 59 BPM | SYSTOLIC BLOOD PRESSURE: 109 MMHG | HEIGHT: 67 IN

## 2020-12-11 LAB
A/G RATIO: 1.8 (ref 1.1–2.2)
ALBUMIN SERPL-MCNC: 4.4 G/DL (ref 3.4–5)
ALP BLD-CCNC: 45 U/L (ref 40–129)
ALT SERPL-CCNC: 13 U/L (ref 10–40)
ANION GAP SERPL CALCULATED.3IONS-SCNC: 9 MMOL/L (ref 3–16)
AST SERPL-CCNC: 19 U/L (ref 15–37)
BASOPHILS ABSOLUTE: 0.1 K/UL (ref 0–0.2)
BASOPHILS RELATIVE PERCENT: 0.7 %
BILIRUB SERPL-MCNC: 0.3 MG/DL (ref 0–1)
BUN BLDV-MCNC: 16 MG/DL (ref 7–20)
CALCIUM SERPL-MCNC: 9.2 MG/DL (ref 8.3–10.6)
CHLORIDE BLD-SCNC: 101 MMOL/L (ref 99–110)
CO2: 26 MMOL/L (ref 21–32)
CREAT SERPL-MCNC: 0.8 MG/DL (ref 0.6–1.1)
D DIMER: 370 NG/ML DDU (ref 0–229)
EOSINOPHILS ABSOLUTE: 0.1 K/UL (ref 0–0.6)
EOSINOPHILS RELATIVE PERCENT: 1.6 %
GFR AFRICAN AMERICAN: >60
GFR NON-AFRICAN AMERICAN: >60
GLOBULIN: 2.4 G/DL
GLUCOSE BLD-MCNC: 75 MG/DL (ref 70–99)
HCG QUALITATIVE: NEGATIVE
HCT VFR BLD CALC: 38.4 % (ref 36–48)
HEMOGLOBIN: 12.9 G/DL (ref 12–16)
LIPASE: 36 U/L (ref 13–60)
LYMPHOCYTES ABSOLUTE: 2.1 K/UL (ref 1–5.1)
LYMPHOCYTES RELATIVE PERCENT: 26.4 %
MCH RBC QN AUTO: 29.9 PG (ref 26–34)
MCHC RBC AUTO-ENTMCNC: 33.6 G/DL (ref 31–36)
MCV RBC AUTO: 89.1 FL (ref 80–100)
MONOCYTES ABSOLUTE: 0.5 K/UL (ref 0–1.3)
MONOCYTES RELATIVE PERCENT: 6.2 %
NEUTROPHILS ABSOLUTE: 5.2 K/UL (ref 1.7–7.7)
NEUTROPHILS RELATIVE PERCENT: 65.1 %
PDW BLD-RTO: 12.9 % (ref 12.4–15.4)
PLATELET # BLD: 249 K/UL (ref 135–450)
PMV BLD AUTO: 7.9 FL (ref 5–10.5)
POTASSIUM REFLEX MAGNESIUM: 3.8 MMOL/L (ref 3.5–5.1)
RBC # BLD: 4.31 M/UL (ref 4–5.2)
SODIUM BLD-SCNC: 136 MMOL/L (ref 136–145)
TOTAL PROTEIN: 6.8 G/DL (ref 6.4–8.2)
TROPONIN: <0.01 NG/ML
WBC # BLD: 7.9 K/UL (ref 4–11)

## 2020-12-11 PROCEDURE — 93005 ELECTROCARDIOGRAM TRACING: CPT | Performed by: PHYSICIAN ASSISTANT

## 2020-12-11 PROCEDURE — 71046 X-RAY EXAM CHEST 2 VIEWS: CPT

## 2020-12-11 PROCEDURE — 83690 ASSAY OF LIPASE: CPT

## 2020-12-11 PROCEDURE — 6360000004 HC RX CONTRAST MEDICATION: Performed by: NURSE PRACTITIONER

## 2020-12-11 PROCEDURE — 99284 EMERGENCY DEPT VISIT MOD MDM: CPT

## 2020-12-11 PROCEDURE — 85379 FIBRIN DEGRADATION QUANT: CPT

## 2020-12-11 PROCEDURE — 84484 ASSAY OF TROPONIN QUANT: CPT

## 2020-12-11 PROCEDURE — 71260 CT THORAX DX C+: CPT

## 2020-12-11 PROCEDURE — 80053 COMPREHEN METABOLIC PANEL: CPT

## 2020-12-11 PROCEDURE — 85025 COMPLETE CBC W/AUTO DIFF WBC: CPT

## 2020-12-11 PROCEDURE — 84703 CHORIONIC GONADOTROPIN ASSAY: CPT

## 2020-12-11 RX ADMIN — IOPAMIDOL 75 ML: 755 INJECTION, SOLUTION INTRAVENOUS at 19:46

## 2020-12-11 ASSESSMENT — ENCOUNTER SYMPTOMS
VOMITING: 0
CONSTIPATION: 0
SORE THROAT: 0
ABDOMINAL DISTENTION: 0
NAUSEA: 0
ABDOMINAL PAIN: 0
DIARRHEA: 0
SHORTNESS OF BREATH: 1
COLOR CHANGE: 0
COUGH: 1

## 2020-12-11 ASSESSMENT — PAIN DESCRIPTION - LOCATION
LOCATION: CHEST
LOCATION: CHEST

## 2020-12-11 ASSESSMENT — PAIN SCALES - GENERAL
PAINLEVEL_OUTOF10: 0
PAINLEVEL_OUTOF10: 5
PAINLEVEL_OUTOF10: 3

## 2020-12-11 ASSESSMENT — PAIN DESCRIPTION - DESCRIPTORS
DESCRIPTORS: BURNING
DESCRIPTORS: BURNING

## 2020-12-11 NOTE — ED NOTES
Bed: E-47  Expected date:   Expected time:   Means of arrival:   Comments:  Lashell 37 y/o CP      Shabana Vasquez RN  12/11/20 [id number]

## 2020-12-11 NOTE — TELEPHONE ENCOUNTER
Ok to prolong quarantine if needed based on her symptoms - it would be on the 14th or at least 24 hours after her symptoms have improved. So longer is ok if she is not feeling well and we can write note next week if needed.

## 2020-12-11 NOTE — TELEPHONE ENCOUNTER
Pt. stated that she has been talking to Dr. Maverick Jimenez through [facility] about her positive COVID test. Dr. Maverick Jimenez gave her doxycycline 100 for 7 days and her last day to take the medication is the Dec. 15.    Pt. Is concerned as her quarantine is up on Dec. 14 however she is still having labored breathing, sinus issues, chest congestion and headaches. Pt. Would like to know if she should still quarantine or if she can go back to work?     Please Advise

## 2020-12-12 NOTE — ED PROVIDER NOTES
HYCLATE (VIBRA-TABS) 100 MG TABLET    Take 1 tablet by mouth 2 times daily for 7 days    DULOXETINE (CYMBALTA) 30 MG EXTENDED RELEASE CAPSULE    Take 1 capsule by mouth daily for 2 weeks, followed by 1 capsule BID    GABAPENTIN (NEURONTIN) 100 MG CAPSULE    Take 1 capsule by mouth 3 times daily as needed (nerve pain) for up to 60 days. Intended supply: 90 days    HYDROCORTISONE (HYTONE) 2.5 % LOTION    Apply topically 2 times daily for no more than 15 consecutive days    MELATONIN 3 MG TABS TABLET    Take 3 mg by mouth daily    NAPROXEN (NAPROSYN) 500 MG TABLET    Take 1 tablet by mouth 2 times daily (with meals)    NICOTINE (NICODERM CQ) 14 MG/24HR    Place 1 patch onto the skin every 24 hours    NICOTINE (NICOTROL) 10 MG/ML SOLN NASAL SPRAY    1 spray by Nasal route every hour as needed for Smoking cessation    NORTRIPTYLINE (PAMELOR) 25 MG CAPSULE    Take 1 capsule by mouth nightly    PREDNISONE (DELTASONE) 10 MG TABLET    Take 4 tabs by mouth daily for 2 days, 3 tabs for 2 days, 2 tabs for 2 days, 1 tab for 2 days    SENNA (SENOKOT) 8.6 MG TABLET    Take 1 tablet by mouth daily    TIZANIDINE (ZANAFLEX) 2 MG TABLET    Take 1 tablet by mouth nightly as needed (neck pain)         Review of Systems:  Review of Systems   Constitutional: Positive for fatigue. Negative for diaphoresis and fever. HENT: Positive for congestion. Negative for sore throat. Eyes: Negative for visual disturbance. Respiratory: Positive for cough and shortness of breath. Cardiovascular: Negative for chest pain, palpitations and leg swelling. Gastrointestinal: Negative for abdominal distention, abdominal pain, constipation, diarrhea, nausea and vomiting. Genitourinary: Negative for dysuria, flank pain, frequency and hematuria. Musculoskeletal: Positive for myalgias. Negative for neck pain and neck stiffness. Skin: Negative for color change, pallor and rash. Allergic/Immunologic: Negative for immunocompromised state. 12/11/20 1651 12/11/20 1915 12/11/20 1922   BP: 124/74 110/61    Pulse: 70 60 66   Resp: 16 17    Temp: 97.5 °F (36.4 °C) 98.1 °F (36.7 °C)    TempSrc: Oral Oral    SpO2: 98% 100%    Weight:  140 lb 14 oz (63.9 kg)    Height:  5' 7\" (1.702 m)        LABS:  Labs Reviewed   D-DIMER, QUANTITATIVE - Abnormal; Notable for the following components:       Result Value    D-Dimer, Quant 370 (*)     All other components within normal limits    Narrative:     Performed at:  [address] [facility] [address]   Phone [phone]   CBC WITH AUTO DIFFERENTIAL    Narrative:     Performed at:  [address] [facility] [address] [address]   Phone [phone]   COMPREHENSIVE METABOLIC PANEL W/ REFLEX TO MG FOR LOW K    Narrative:     Performed at:  [address] [facility] [address] [address]   Phone [phone]   TROPONIN    Narrative:     Performed at:  [facility]  [address] [place] [address] [facility] [address] [address]   Phone [phone]   HCG, SERUM, QUALITATIVE    Narrative:     Performed at:  [place] [address] [address], [place] [facility] [address] [address]   Phone [phone]   LIPASE    Narrative:     added on to earlier labs  Performed at:  [address] [facility] [address] [address]   Phone [phone] of labs reviewed and werenegative at this time or not returned at the time of this note. RADIOLOGY:   Non-plain film images such as CT, Ultrasound and MRI are read by the radiologist. DAV Chow CNP have directly visualized the radiologic plain film image(s) with the below findings:        Interpretation per the Radiologist below, if available at the time of this note:    CT CHEST PULMONARY EMBOLISM W CONTRAST   Final Result   1.  No pulmonary embolism to the segmental level. 2. Unable to evaluate distal pulmonary artery branches beyond the segmental   level, suboptimal opacification. 3. No acute pulmonary disease. Specifically, no pulmonary findings of   COVID-19. In         XR CHEST (2 VW)   Final Result   No radiographic evidence of acute pulmonary disease. Xr Chest (2 Vw)    Result Date: 12/11/2020  EXAMINATION: TWO XRAY VIEWS OF THE CHEST 12/11/2020 5:21 pm COMPARISON: None. HISTORY: ORDERING SYSTEM PROVIDED HISTORY: cp TECHNOLOGIST PROVIDED HISTORY: Reason for exam:->cp Reason for Exam: chest pain Acuity: Acute Type of Exam: Initial FINDINGS: HEART/MEDIASTINUM: The cardiomediastinal silhouette is within normal limits. PLEURA/LUNGS: There are no focal consolidations or pleural effusions. There is no appreciable pneumothorax. BONES/SOFT TISSUE: No acute abnormality. No radiographic evidence of acute pulmonary disease. Ct Chest Pulmonary Embolism W Contrast    Result Date: 12/11/2020  EXAMINATION: CTA OF THE CHEST 12/11/2020 7:36 pm TECHNIQUE: CTA of the chest was performed after the administration of intravenous contrast.  Multiplanar reformatted images are provided for review. MIP images are provided for review. Dose modulation, iterative reconstruction, and/or weight based adjustment of the mA/kV was utilized to reduce the radiation dose to as low as reasonably achievable. COMPARISON: Chest radiograph performed earlier today. HISTORY: ORDERING SYSTEM PROVIDED HISTORY: sob, cp, hx COVID / patient tested positive on 12/02/2020 Acuity: Unknown Type of Exam: Ongoing FINDINGS: Pulmonary Arteries: Pulmonary arteries are adequately opacified for evaluation of main and segmental branches, inadequate for evaluation of more distal branches. No evidence of intraluminal filling defect to suggest pulmonary embolism. Main pulmonary artery is normal in caliber, 20 mm. Mediastinum: No evidence of mediastinal lymphadenopathy. negative cardiac work-up today with a normal EKG and negative troponin. I have no suspicion for ACS. CT shows no PE.  CBC and chemistry normal.  Pregnancy negative. Patient advised to continue the antibiotics and steroids and discharged home in stable condition. At this time, the evidence for any other entities in the differential is insufficient to justify any further testing. This was explained to the patient. The patient was advised that persistent or worsening symptoms will require further evaluation. I discussed with Emelina Ward and/or family the exam results, diagnosis, care, prognosis, reasons to return and the importance of follow up. Patient and/or family is in full agreement with plan and all questions have been answered. Specific discharge instructions explained, including reasons to return to the emergency department. Emelina Ward is well appearing, non-toxic, and afebrile at the time of discharge. Patient was instructed to follow up with primary care provider in 24-48 hours, and to instructed to return to ED immediately for any new or worsening concerns. Emelina Ward verbalized understanding and discharged home. The patient tolerated their visit well. I evaluated the patient. The physician was available for consultation as needed. The patient and / or the family were informed of the results of any tests, a time was given to answer questions, a plan was proposed and they agreed with plan. CLINICAL IMPRESSION:  1.  COVID-19        DISPOSITION Decision To Discharge 12/11/2020 08:18:38 PM      PATIENT REFERRED TO:  MD Ross Muro [facility] [address] [facility] [address] [place] [address]  [phone]    Schedule an appointment as soon as possible for a visit       [facility] Emergency Department  [id number]  [phone]  Go to   As needed      DISCHARGE MEDICATIONS:  New Prescriptions    No medications on file DISCONTINUED MEDICATIONS:  Discontinued Medications    No medications on file              (Please note the MDM and HPI sections of this note were completed with a voice recognition program.  Efforts were made to edit the dictations but occasionally words are mis-transcribed.)    Electronically signed, DAV Jacobsen CNP,           DAV Jacobsen CNP  12/11/20 4180

## 2020-12-12 NOTE — ED PROVIDER NOTES
The Ekg interpreted by me shows  Sinus rhythm with a rate of64  Axis is   Normal   QTc is  Normal   Intervals and Durations are unremarkable. ST Segments: nonspecific st changes     Have not seen or evaluated this patient. This is EKG interpretation only.              Jeet Fisher MD  12/11/20 2013

## 2020-12-13 ENCOUNTER — CARE COORDINATION (OUTPATIENT)
Dept: CARE COORDINATION | Age: 40
End: 2020-12-13

## 2020-12-13 LAB
EKG ATRIAL RATE: 64 BPM
EKG DIAGNOSIS: NORMAL
EKG P AXIS: 66 DEGREES
EKG P-R INTERVAL: 156 MS
EKG Q-T INTERVAL: 388 MS
EKG QRS DURATION: 88 MS
EKG QTC CALCULATION (BAZETT): 400 MS
EKG R AXIS: 66 DEGREES
EKG T AXIS: 67 DEGREES
EKG VENTRICULAR RATE: 64 BPM

## 2020-12-14 ENCOUNTER — TELEPHONE (OUTPATIENT)
Dept: FAMILY MEDICINE CLINIC | Age: 40
End: 2020-12-14

## 2020-12-14 ENCOUNTER — NURSE TRIAGE (OUTPATIENT)
Dept: OTHER | Facility: CLINIC | Age: 40
End: 2020-12-14

## 2020-12-14 NOTE — TELEPHONE ENCOUNTER
Pt. Called in stating she was tested for COVID on Dec. 2 & test came back positive on Dec. 4    Pt. Went to the ED on Friday Dec. 11. They did an EKG, chest x ray & CT. They didn't see anything abnormal on the tests. She was released that night. Pt. Is still having COVID symptoms. She is having SOB, chest heaviness, headaches, sinus & chest congestion, runny nose with light color drainage, ear aches, sore throat, body fatigue. She is still having intermittent lost of taste. Saturday evening she felt she was having an overwhelming experience of a chemical smell that made her nauseous. She felt like she had a menthol sensation over her entire body last night which lasted several hours. She has been taking tylenol and the doxycycline that Dr. Nikkie Roberts gave her. Pt. Is concerned because she is supposed to be getting her children this Friday along with having a dental appt this Friday as well for future oral surgery. She needs to know if she should make other arrangements.      Please Advise

## 2020-12-14 NOTE — TELEPHONE ENCOUNTER
I think with the normal ER visit findings it would be ok to rest at home since no pneumonia found. If she is not feeling any better by Wed then she should rearrange her Friday plans. Would have to continue quarantine until SOB cough and congestion are improving.

## 2020-12-14 NOTE — TELEPHONE ENCOUNTER
Reason for Disposition   Information only question and nurse able to answer    Answer Assessment - Initial Assessment Questions  1. REASON FOR CALL or QUESTION: \"What is your reason for calling today? \" or \"How can I best help you? \" or \"What question do you have that I can help answer? \"      Follow up from hospital - ED    Protocols used: INFORMATION ONLY CALL - NO TRIAGE-ADULT-OH    Pt called in to schedule follow up with PCP after hospital visit on 12/11/2020 - no triage needed.

## 2020-12-14 NOTE — TELEPHONE ENCOUNTER
Should she be seen at [facility]?   Was at ED on 12/11  I will tell her to continue to quarantine beyond the 10 days due to sx  Please advise

## 2020-12-15 ENCOUNTER — TELEPHONE (OUTPATIENT)
Dept: FAMILY MEDICINE CLINIC | Age: 40
End: 2020-12-15

## 2020-12-15 RX ORDER — ALBUTEROL SULFATE 90 UG/1
2 AEROSOL, METERED RESPIRATORY (INHALATION) 4 TIMES DAILY PRN
Qty: 1 INHALER | Refills: 5 | Status: SHIPPED | OUTPATIENT
Start: 2020-12-15 | End: 2022-10-12 | Stop reason: SDUPTHER

## 2020-12-15 NOTE — TELEPHONE ENCOUNTER
Please call pt to get her in at [facility]  Can be tomorrow (Wed) or if she'd prefer to see me we can schedule her on Thursday  Thanks.

## 2020-12-24 ENCOUNTER — VIRTUAL VISIT (OUTPATIENT)
Dept: FAMILY MEDICINE CLINIC | Age: 40
End: 2020-12-24
Payer: COMMERCIAL

## 2020-12-24 PROCEDURE — G8427 DOCREV CUR MEDS BY ELIG CLIN: HCPCS | Performed by: FAMILY MEDICINE

## 2020-12-24 PROCEDURE — 99214 OFFICE O/P EST MOD 30 MIN: CPT | Performed by: FAMILY MEDICINE

## 2020-12-24 NOTE — PROGRESS NOTES
12/24/2020    This is a 38 y.o. female   Chief Complaint   Patient presents with    Positive For Covid-19     found out he was pos on dec 4. pt is having very intermittent severe headaches. towards base of skull going up her head. some ear pain. mild sore scratchy throat. slight sob at times. sxs have overall improved. slight low grade fever on sunday. 99.4. HPI   Patient following up after positive COVID test. This occurred on the 4th (about 20 days ago)  - she continues to have headache, ear pain, mild scratchy sore throat, slight SOB at times. She does feel like she has slowly improved over the past week or so. - over the weekend her headaches have worsened. These were actually going on for a week or two before she got covid. Pain is in the occipital area. Sharp, stabbing, and electric in nature. Sometimes goes up back of head or wraps around to ears. She takes naproxen which barely takes the edge off.      Review of Systems   As per HPI, otherwise negative    Past Medical History:   Diagnosis Date    Bipolar disorder ([facility] [address]     Pt was dx age 14    HPV in female     Medical history reviewed with no changes     Ovarian cyst     Ovarian cyst rupture        Past Surgical History:   Procedure Laterality Date    BACK SURGERY  06/2013    LUMBAR FUSION    CERVIX SURGERY      biopsy    PAIN MANAGEMENT PROCEDURE N/A 2/18/2020    CERVICAL SIX SEVEN INTERLAMINAR EPIDURAL STEROID INJECTION SITE CONFIRMED BY FLUOROSCOPY performed by Cayden Quinteros MD at [address] [place] [address]  2013    L5 S1    TUBAL LIGATION  11/2010       Family History   Problem Relation Age of Onset    Asthma Father     Other Father     Cancer Maternal Aunt         ovarian and cervical     Asthma Maternal Aunt     Hyperthyroidism Maternal Aunt     Cancer Maternal Uncle     Prostate Cancer Paternal Uncle     Breast Cancer Maternal Grandmother     Diabetes Maternal Grandfather  High Blood Pressure Maternal Grandfather     Prostate Cancer Paternal Grandfather        Current Outpatient Medications   Medication Sig Dispense Refill    omeprazole (PRILOSEC) 20 MG delayed release capsule Take 1 capsule by mouth daily 30 capsule 0    albuterol sulfate HFA (VENTOLIN HFA) 108 (90 Base) MCG/ACT inhaler Inhale 2 puffs into the lungs 4 times daily as needed for Wheezing 1 Inhaler 5    tiZANidine (ZANAFLEX) 2 MG tablet Take 1 tablet by mouth nightly as needed (neck pain) 10 tablet 0    docusate sodium (COLACE) 100 MG capsule Take 100 mg by mouth 2 times daily      senna (SENOKOT) 8.6 MG tablet Take 1 tablet by mouth daily      naproxen (NAPROSYN) 500 MG tablet Take 1 tablet by mouth 2 times daily (with meals) 60 tablet 2    nicotine (NICOTROL) 10 MG/ML SOLN nasal spray 1 spray by Nasal route every hour as needed for Smoking cessation 2 Bottle 5    melatonin 3 MG TABS tablet Take 3 mg by mouth daily      acetaminophen (APAP EXTRA STRENGTH) 500 MG tablet Take 2 tablets by mouth every 6 hours as needed for Pain DO NOT TAKE WITH OTHER MEDICATIONS CONTAINING ACETAMINOPHEN. 30 tablet 0    nicotine (NICODERM CQ) 14 MG/24HR Place 1 patch onto the skin every 24 hours 30 patch 2    gabapentin (NEURONTIN) 100 MG capsule Take 1 capsule by mouth 3 times daily as needed (nerve pain) for up to 60 days. Intended supply: 90 days 90 capsule 1     No current facility-administered medications for this visit. LMP 12/07/2020     Physical Exam  Constitutional:       Appearance: She is well-developed. HENT:      Head: Normocephalic and atraumatic. Pulmonary:      Effort: Pulmonary effort is normal.   Skin:     Coloration: Skin is not pale. Neurological:      Mental Status: She is alert and oriented to person, place, and time.          Wt Readings from Last 3 Encounters:   12/16/20 140 lb (63.5 kg)   12/11/20 140 lb 14 oz (63.9 kg)   07/16/20 136 lb 9.6 oz (62 kg) BP Readings from Last 3 Encounters:   12/16/20 124/80   12/11/20 (!) 109/59   07/16/20 120/74         Assessment/Plan:  1. Bilateral occipital neuralgia  She will use gabapentin for pain control until she can get in for PM&R for consideration of injections for both diagnostic and treatment purposes  - [facility] - Dmitri Pineda MD, Physical Medicine and Rehabilitation, [facility]    2. COVID-19 virus infection  Her symptoms are starting to improve but still not back to baseline. Discussed return to work date of Jan 2.     Bishnu Bragg is a 38 y.o. female being evaluated by a Virtual Visit (video visit) encounter to address concerns as mentioned above. A caregiver was present when appropriate. Due to this being a TeleHealth encounter (During [facility]- public health emergency), evaluation of the following organ systems was limited: Vitals/Constitutional/EENT/Resp/CV/GI//MS/Neuro/Skin/Heme-Lymph-Imm. Pursuant to the emergency declaration under the [address] [place] [address] and the [employer] Act, this Virtual Visit was conducted with patient's (and/or legal guardian's) consent, to reduce the patient's risk of exposure to COVID-19 and provide necessary medical care. The patient (and/or legal guardian) has also been advised to contact this office for worsening conditions or problems, and seek emergency medical treatment and/or call 911 if deemed necessary. Patient identification was verified at the start of the visit: yes  Total time spent for this encounter: 31 minutes    Services were provided through a video synchronous discussion virtually to substitute for in-person clinic visit. Patient and provider were located at their individual homes. --Simin Foley MD on 12/24/2020 at 9:45 AM    An electronic signature was used to authenticate this note.

## 2021-01-04 ENCOUNTER — PATIENT MESSAGE (OUTPATIENT)
Dept: FAMILY MEDICINE CLINIC | Age: 41
End: 2021-01-04

## 2021-01-04 ENCOUNTER — CARE COORDINATION (OUTPATIENT)
Dept: CARE COORDINATION | Age: 41
End: 2021-01-04

## 2021-01-04 NOTE — CARE COORDINATION
You Patient resolved from the Care Transitions episode on 12/11/2020  Discussed COVID-19 related testing which was not done at this time. Test results were not done. Patient informed of results, if available? N/A    Patient/family has been provided the following resources and education related to COVID-19:                         Signs, symptoms and red flags related to COVID-19            CDC exposure and quarantine guidelines            Conduit exposure contact - [phone]            Contact for their local Department of Health              Patient currently reports that the following symptoms have improved:  Chest pain and shortness of breath     No further outreach scheduled with this CTN/ACM. Episode of Care resolved. Patient has this CTN/ACM contact information if future needs arise.

## 2021-01-04 NOTE — TELEPHONE ENCOUNTER
From: Zohreh Troy  To: Amisha Espino MD  Sent: 1/4/2021 9:12 AM EST  Subject: Visit Follow-Up Question    Good morning Dr. Leung Socks like to follow up and ask additional questions after our last appointment. Over the past few days some of my lingering and possibly COVID symptoms have increased. As previously discussed some symptoms were showing signs of improvement. Now it's as if I've regressed some. I've been experiencing change in taste. In addition to significant dry mouth. Some intermittent ear pain that present without headaches at times. Mild sore throat, some muscle soreness, and increased fatigue. All that said we'd agreed the hope was to be at 90 - 95% symptom improvement before returning to work. Which you mentioned likely to be cleared on the 2nd. Being a month since having tested for COVID. In lieu of what I've shared and remaining cautious. My employer I decided. As I only work Monday - Friday. To not return today follow up with you. Look at once again whether to be tested again. Or at least should I remain home still for a period. On a side note I'm still struggling with headaches. But I've got an appointment with Dr. Camryn Hernandez office for a consult. That said some of the headache symptoms additionally has reverted back to before. During the time I was experiencing heightened COVID symptoms. Referring to not just at the base of my skull, etc.    I'm also experiencing once again more in relation to my teeth. Though I have the oral surgery scheduled for Friday. I'm wondering if you'd suggest starting the antibiotic? To head off anything there and hopefully avoid postponing that appointment due to any developing infection again.      Thank you,    Elisha Najera   [phone] cell phone

## 2021-01-11 ENCOUNTER — PATIENT MESSAGE (OUTPATIENT)
Dept: FAMILY MEDICINE CLINIC | Age: 41
End: 2021-01-11

## 2021-01-11 DIAGNOSIS — M79.601 BILATERAL ARM PAIN: ICD-10-CM

## 2021-01-11 DIAGNOSIS — M79.602 BILATERAL ARM PAIN: ICD-10-CM

## 2021-01-11 RX ORDER — GABAPENTIN 100 MG/1
100 CAPSULE ORAL 3 TIMES DAILY PRN
Qty: 90 CAPSULE | Refills: 1 | Status: SHIPPED | OUTPATIENT
Start: 2021-01-11 | End: 2021-04-05 | Stop reason: SDUPTHER

## 2021-01-11 NOTE — TELEPHONE ENCOUNTER
Can be in person appt.  Recently had COVID but more than a few weeks ago so is ok to be seen in person now

## 2021-01-11 NOTE — TELEPHONE ENCOUNTER
From: Summer Mo  To: Angela Casarez MD  Sent: 1/11/2021 1:07 PM EST  Subject: Visit Ambrocio Ann you for getting back to me last week. I start the antibiotic and since finished that. I also had the appointment with the oral surgeon (last) Friday. They were able to extract the back molars on either side of my mouth. I'm still recovering from that procedure but seems to be going well. That being said I reach out to that office today. To ask if having a yellow/brown discoloration throughout my mouth into my throat. Accompanied with throat irritation and white blood red blotches at the back of my throat. Is normal after the extraction during recovery. The surgeon said none of those symptoms would be related to the extraction. He suggested I contact you about my concerns. In addition to suggesting gargling with warm salt water, using an OTC mouthwash, and brushing. I've been doing all the above for a few days with no change. Therefore I'm wondering about being seen. To evaluate if something is going on that may need farther medical treatment. Also could you call in a script for the neurotin? I've almost used all of the previous/last script and have no refills. I will be going in this afternoon for the injections. Thank you,    Joseph Check   [phone] cell      ----- Message -----   From:Tonny Coronado MD   Sent:1/4/2021 12:06 PM EST   To:Beth Forman   Subject:RE: Visit Follow-Up Question    Roque Asif,    I'm sorry that your COVId symptoms are lingering. It's unfortunately not uncommon for them to wax and wane over weeks as you get over the virus. Unfortunately as we discussed, retesting for the virus is not usually helpful as many people whose symptoms have completely resolved can still test positive for the virus. I would hold off on returning to work until you are improving - if you need a note to this effect let me know.     I'd go ahead and start taking the antibiotic leading up to your dentist appointment. Keep in mind it can cause loose stools, we recommend eating yogurt or taking a probiotic while on it. While not specifically for respiratory problems as well, it could also benefit your other worsening symptoms simultaneously. Sincerely,  Dr Coronado      ----- Message -----   From:Beth Forman   Sent:1/4/2021 9:12 AM EST   To:Tonny Coronado MD   Subject:Visit Follow-Up Question    Good morning Dr. Bala Baker like to follow up and ask additional questions after our last appointment. Over the past few days some of my lingering and possibly COVID symptoms have increased. As previously discussed some symptoms were showing signs of improvement. Now it's as if I've regressed some. I've been experiencing change in taste. In addition to significant dry mouth. Some intermittent ear pain that present without headaches at times. Mild sore throat, some muscle soreness, and increased fatigue. All that said we'd agreed the hope was to be at 90 - 95% symptom improvement before returning to work. Which you mentioned likely to be cleared on the 2nd. Being a month since having tested for COVID. In lieu of what I've shared and remaining cautious. My employer I decided. As I only work Monday - Friday. To not return today follow up with you. Look at once again whether to be tested again. Or at least should I remain home still for a period. On a side note I'm still struggling with headaches. But I've got an appointment with Dr. Andino Guest office for a consult. That said some of the headache symptoms additionally has reverted back to before. During the time I was experiencing heightened COVID symptoms. Referring to not just at the base of my skull, etc.    I'm also experiencing once again more in relation to my teeth. Though I have the oral surgery scheduled for Friday. I'm wondering if you'd suggest starting the antibiotic?  To head off anything there and hopefully avoid postponing that appointment due to any developing infection again.      Thank you,    Kae Kayser   [phone] cell phone

## 2021-01-14 ENCOUNTER — OFFICE VISIT (OUTPATIENT)
Dept: FAMILY MEDICINE CLINIC | Age: 41
End: 2021-01-14
Payer: COMMERCIAL

## 2021-01-14 ENCOUNTER — TELEPHONE (OUTPATIENT)
Dept: FAMILY MEDICINE CLINIC | Age: 41
End: 2021-01-14

## 2021-01-14 VITALS
HEART RATE: 66 BPM | DIASTOLIC BLOOD PRESSURE: 82 MMHG | TEMPERATURE: 97.5 F | SYSTOLIC BLOOD PRESSURE: 138 MMHG | BODY MASS INDEX: 21.97 KG/M2 | WEIGHT: 140 LBS | HEIGHT: 67 IN | OXYGEN SATURATION: 99 %

## 2021-01-14 DIAGNOSIS — R19.5 LOOSE STOOLS: ICD-10-CM

## 2021-01-14 DIAGNOSIS — K06.8 PAIN IN GUMS: Primary | ICD-10-CM

## 2021-01-14 PROCEDURE — 4004F PT TOBACCO SCREEN RCVD TLK: CPT | Performed by: FAMILY MEDICINE

## 2021-01-14 PROCEDURE — 99213 OFFICE O/P EST LOW 20 MIN: CPT | Performed by: FAMILY MEDICINE

## 2021-01-14 PROCEDURE — G8484 FLU IMMUNIZE NO ADMIN: HCPCS | Performed by: FAMILY MEDICINE

## 2021-01-14 PROCEDURE — G8420 CALC BMI NORM PARAMETERS: HCPCS | Performed by: FAMILY MEDICINE

## 2021-01-14 PROCEDURE — G8427 DOCREV CUR MEDS BY ELIG CLIN: HCPCS | Performed by: FAMILY MEDICINE

## 2021-01-14 RX ORDER — LACTOBACILLUS RHAMNOSUS GG 10B CELL
1 CAPSULE ORAL DAILY
Qty: 30 CAPSULE | Refills: 0 | Status: SHIPPED | OUTPATIENT
Start: 2021-01-14 | End: 2021-03-19

## 2021-01-14 RX ORDER — IBUPROFEN 800 MG/1
800 TABLET ORAL
Qty: 90 TABLET | Refills: 0 | Status: SHIPPED | OUTPATIENT
Start: 2021-01-14 | End: 2021-04-15

## 2021-01-14 SDOH — ECONOMIC STABILITY: TRANSPORTATION INSECURITY
IN THE PAST 12 MONTHS, HAS THE LACK OF TRANSPORTATION KEPT YOU FROM MEDICAL APPOINTMENTS OR FROM GETTING MEDICATIONS?: NOT ASKED

## 2021-01-14 SDOH — ECONOMIC STABILITY: FOOD INSECURITY: WITHIN THE PAST 12 MONTHS, THE FOOD YOU BOUGHT JUST DIDN'T LAST AND YOU DIDN'T HAVE MONEY TO GET MORE.: NEVER TRUE

## 2021-01-14 SDOH — ECONOMIC STABILITY: INCOME INSECURITY: HOW HARD IS IT FOR YOU TO PAY FOR THE VERY BASICS LIKE FOOD, HOUSING, MEDICAL CARE, AND HEATING?: NOT HARD AT ALL

## 2021-01-14 NOTE — PROGRESS NOTES
1/14/2021    This is a 38 y.o. female   Chief Complaint   Patient presents with    Pharyngitis     since tooth extraction last week / has not felt well since having covid    Diarrhea     started yesterday     HPI    Here for sore throat and congestion  - she had a tooth extraction about a week ago and it started after this. She notes some discharge and mucus from where the extraction was performed. She also notes throat irritation as well. Feels like her saliva is thicker and more difficult to swallow. She also notes diarrhea that started last night. She reports having 4-5 loose stools since last evening. No blood or melena. No abdominal pain. No vomiting. No fever  She was on clindamycin recently for dental infection (PCN allergy)    - Dx with COVId about 6 weeks ago.      Occipital Neuralgia  - She's had some injections with Dr. Nikhil Sam that were helpful as a diagnostic test and so is scheduling f/u for injections with corticosteroids    Review of Systems   As per HPI, otherwise negative    Past Medical History:   Diagnosis Date    Bipolar disorder ([facility] [address]     Pt was dx age 11    [facility] in female    [facility]     Ovarian cyst     Ovarian cyst rupture        Past Surgical History:   Procedure Laterality Date    BACK SURGERY  06/2013    LUMBAR FUSION    CERVIX SURGERY      biopsy    PAIN MANAGEMENT PROCEDURE N/A 2/18/2020    CERVICAL SIX SEVEN INTERLAMINAR EPIDURAL STEROID INJECTION SITE CONFIRMED BY FLUOROSCOPY performed by Yelena Jacobs MD at [address] [place] [address]  2013    L5 S1    TUBAL LIGATION  11/2010       Family History   Problem Relation Age of Onset    Asthma Father     Other Father     Cancer Maternal Aunt         ovarian and cervical     Asthma Maternal Aunt     Hyperthyroidism Maternal Aunt     Cancer Maternal Uncle     Prostate Cancer Paternal Uncle     Breast Cancer Maternal Grandmother     Diabetes Maternal Grandfather     High Blood Pressure Maternal Grandfather     Prostate Cancer Paternal Grandfather        Current Outpatient Medications   Medication Sig Dispense Refill    lactobacillus (CULTURELLE) capsule Take 1 capsule by mouth daily 30 capsule 0    ibuprofen (ADVIL;MOTRIN) 800 MG tablet Take 1 tablet by mouth 3 times daily (with meals) 90 tablet 0    gabapentin (NEURONTIN) 100 MG capsule Take 1 capsule by mouth 3 times daily as needed (nerve pain) for up to 60 days. Intended supply: 90 days 90 capsule 1    omeprazole (PRILOSEC) 20 MG delayed release capsule Take 1 capsule by mouth daily 30 capsule 0    albuterol sulfate HFA (VENTOLIN HFA) 108 (90 Base) MCG/ACT inhaler Inhale 2 puffs into the lungs 4 times daily as needed for Wheezing 1 Inhaler 5    tiZANidine (ZANAFLEX) 2 MG tablet Take 1 tablet by mouth nightly as needed (neck pain) 10 tablet 0    docusate sodium (COLACE) 100 MG capsule Take 100 mg by mouth 2 times daily      senna (SENOKOT) 8.6 MG tablet Take 1 tablet by mouth daily      naproxen (NAPROSYN) 500 MG tablet Take 1 tablet by mouth 2 times daily (with meals) 60 tablet 2    nicotine (NICOTROL) 10 MG/ML SOLN nasal spray 1 spray by Nasal route every hour as needed for Smoking cessation 2 Bottle 5    melatonin 3 MG TABS tablet Take 3 mg by mouth daily      acetaminophen (APAP EXTRA STRENGTH) 500 MG tablet Take 2 tablets by mouth every 6 hours as needed for Pain DO NOT TAKE WITH OTHER MEDICATIONS CONTAINING ACETAMINOPHEN. 30 tablet 0    nicotine (NICODERM CQ) 14 MG/24HR Place 1 patch onto the skin every 24 hours (Patient not taking: Reported on 1/14/2021) 30 patch 2     No current facility-administered medications for this visit. /82 (Site: Right Upper Arm, Position: Sitting, Cuff Size: Large Adult)   Pulse 66   Temp 97.5 °F (36.4 °C) (Temporal)   Ht 5' 7\" (1.702 m)   Wt 140 lb (63.5 kg)   SpO2 99%   BMI 21.93 kg/m²     Physical Exam  Constitutional:       Appearance: She is well-developed.

## 2021-01-27 ENCOUNTER — HOSPITAL ENCOUNTER (OUTPATIENT)
Dept: PHYSICAL THERAPY | Age: 41
Setting detail: THERAPIES SERIES
Discharge: HOME | End: 2021-01-27
Payer: COMMERCIAL

## 2021-01-27 PROCEDURE — 97140 MANUAL THERAPY 1/> REGIONS: CPT

## 2021-01-27 PROCEDURE — 97163 PT EVAL HIGH COMPLEX 45 MIN: CPT

## 2021-01-27 PROCEDURE — G0283 ELEC STIM OTHER THAN WOUND: HCPCS

## 2021-01-27 ASSESSMENT — PAIN SCALES - GENERAL: PAINLEVEL_OUTOF10: 7

## 2021-01-27 ASSESSMENT — PAIN DESCRIPTION - PAIN TYPE: TYPE: CHRONIC PAIN

## 2021-01-27 NOTE — PLAN OF CARE
[facility] - Outpatient Rehabilitation & Therapy  [address] [place] [address]  Phone: [phone]   Fax:     [phone]          Physical Therapy Certification    Dear Referring Practitioner: Pooja Carlson MD,    We had the pleasure of evaluating the following patient for physical therapy services at [facility]. A summary of our findings can be found in the initial assessment below. This includes our plan of care. If you have any questions or concerns regarding these findings, please do not hesitate to contact me at the office phone number checked above.   Thank you for the referral.       Physician Signature:_______________________________Date:__________________  By signing above (or electronic signature), therapists plan is approved by physician            Patient: Anika Alcantar   : 1980   MRN: [id number]  Referring Physician: Referring Practitioner: Pooja Carlson MD      Evaluation Date: 2021      Medical Diagnosis Information:  Diagnosis: M54.81 (ICD-10-CM) - Occipital neuralgia   Treatment Diagnosis: Pain in neck and headaches, limited mobility of cervical spine , weakness in UE's, tightness of cervical muscles                                         Insurance information: PT Insurance Information: [place]    Precautions/ Contra-indications:   Latex Allergy:  [x]NO      []YES  Preferred Language for Healthcare:   [x]English       []other:    C-SSRS Triggered by Intake questionnaire (Past 2 wk assessment ):   [x] No, Questionnaire did not trigger screening.   [] Yes, Patient intake triggered C-SSRS Screening      [] C-SSRS Screening completed  [] PCP notified via Epic     SUBJECTIVE: Patient stated complaint:Neck pain and headache  Relevant Medical History:Additional Pertinent Hx: Independent  Functional Outcomes Measure: NDI= 33/CL    Pain Scale: 3-7/10  Easing factors: Nothing eases  Provocative factors: Sleeping and driving    Type: [x]Constant   []Intermittent  []Radiating []Localized []other:     Numbness/Tingling: In lower extremities  Occupation/School: Employed, but just off now. Living Status/Prior Level of Function: Independent with ADLs and IADLs,  OBJECTIVE:   Posture: Slight increased lumbar lordosis    Functional Mobility/Transfers: Difficulty with car transfers  Palpation: Very tight    Bandages/Dressings/Incisions: NA    Gait: (include devices/WB status):   Antalgic gait      CERV ROM     Cervical Flexion 17    Cervical Extension 20     Left Right   Cervical SB 27 20   Cervical rotation 40 40   Quadrant Pulling on upper cervical spine/ neck Pullng on upper cervical spine   Dorsal Glide      UE ROM Left Right   Shoulder Flex 140 160   Shoulder Abd 110 130   Shoulder ER 70 70   Shoulder IR WNL WNL   Elbow flex/ext     Wrist flex/ext/pro/sup     Finger flex/ext/opposition     Shoulder AROM WNL w OP     UE Strength  Left Right   Shoulder Flex 3/5 3/5   Shoulder Scap 3/5 3/5   Shoulder ABd (C5 Axillary) 3/5 3/5   Shoulder ER      Shoulder IR 5/5 5/5   Elbow Flex (C5 Musc) 3/5 3/5   Elbow Ext (C7 Radial) 4/5 4/5   Wrist Flex (C6 Radial) 3/5 3/5   Wrist Ext (C7 Radial) 3/5 3/5   Finger flex (C8 median)     Finger ext (C7 Radial-PIN)     APB (T1 Median)     Finger Abd (T1 Ulnar)     UE myotomes WNL        Reflexes Normal Abnormal Comments               S1-2 Seated achilles [] []    S1-2 Prone knee bend [] []    L3-4 Patellar tendon [] []    C5-6 Biceps [x] []    C6 Brachioradialis [x] []    C7-8 Triceps [x] []      Reflexes/Sensation:    []Dermatomes/Myotomes intact    [x]Reflexes equal and normal bilaterally   [x]Other: Right has greater light touch sensation than left    Joint mobility:    []Normal    [x]Hypo   []Hyper    Neurodynamics:     Orthopedic Special Tests:    Cluster Testing  Normal Abnormal N/A Comments   Babinski Test [] [] []    Armendariz Test [] [] []    Inverted Sup Sign [] [] []    Alar Ligament Test [] [] []    Transverse Ligament Test [] [] []    Sharp-Herminia Test [] [] []    Hautards Test [] [] []    Vertebral Artery Test [] [] []             Neural dynamic/ Tension testing Normal Abnormal N/A Comments   Spurling Maneuver:  [] [x] [] Increased neck pain   Distraction testing: [] [] []    ULNT [] [] []    Shoulder Abd testing  [] [] []    Cerv Rot/Lat Flex- 1st Rib [] [] []    Deep Neck Flex/endurance testing [] [] []    Craniocerv Flex testing Albertina Marco [] [] []    End Range Tolerance testing. [] [] []     [] [] []                           [x] Patient history, allergies, meds reviewed. Medical chart reviewed. See intake form. Review Of Systems (ROS):  [x]Performed Review of systems (Integumentary, CardioPulmonary, Neurological) by intake and observation. Intake form has been scanned into medical record. Patient has been instructed to contact their primary care physician regarding ROS issues if not already being addressed at this time.       Co-morbidities/Complexities (which will affect course of rehabilitation):   []None           Arthritic conditions   []Rheumatoid arthritis (M05.9)  []Osteoarthritis (M19.91)   Cardiovascular conditions   []Hypertension (I10)  []Hyperlipidemia (E78.5)  []Angina pectoris (I20)  []Atherosclerosis (I70)  []CVA Musculoskeletal conditions   [x]Disc pathology   []Congenital spine pathologies   []Prior surgical intervention  []Osteoporosis (M81.8)  []Osteopenia (M85.8)   Endocrine conditions   []Hypothyroid (E03.9)  []Hyperthyroid Gastrointestinal conditions   []Constipation (V37.10)   Metabolic conditions   []Morbid obesity (E66.01)  []Diabetes type 1(E10.65) or 2 (E11.65)   [x]Neuropathy (G60.9)     Pulmonary conditions   []Asthma (J45)  []Coughing   []COPD (J44.9)   Psychological Disorders  [x]Anxiety (F41.9)  [x]Depression (F32.9)   []Other:   [x]Other:   Headaches     Barriers to/and or personal factors that will affect rehab potential:              []Age  []Sex   [x]Smoker              []Motivation/Lack of Motivation                        []Co-Morbidities              []Cognitive Function, education/learning barriers              []Environmental, home barriers              []profession/work barriers  [x]past PT/medical experience  []other:  Justification:     Falls Risk Assessment (30 days):   [x] Falls Risk assessed and no intervention required.   [] Falls Risk assessed and Patient requires intervention due to being higher risk   TUG score (>12s at risk):     [] Falls education provided, including     ASSESSMENT:   Functional Impairments:     [x]Noted cervical/thoracic/GHJ joint hypomobility   []Noted cervical/thoracic/GHJ joint hypermobility   [x]Decreased cervical/UE functional ROM   [x]Noted Headache pain aggravated by neck movements with/without dizziness   []Abnormal reflexes/sensation/myotomal/dermatomal deficits   []Decreased DCF control or ability to hold head up   []Decreased RC/scapular/core strength and neuromuscular control    [x]Decreased UE functional strength   []other:      Functional Activity Limitations (from functional questionnaire and intake)   []Reduced ability to tolerate prolonged functional positions   []Reduced ability or difficulty with changes of positions or transfers between positions   []Reduced ability to maintain good posture and demonstrate good body mechanics with sitting, bending, and lifting   [x] Reduced ability or tolerance with driving and/or computer work   []Reduced ability to perform lifting, reaching, carrying tasks   []Reduced ability to concentrate   [x]Reduced ability to sleep    []Reduced ability to tolerate any impact through UE or spine   []Reduced ability to ambulate prolonged functional periods/distances   []other:    Participation Restrictions   []Reduced participation in self care activities   []Reduced participation in home management activities   [x]Reduced participation in work activities   []Reduced participation in social activities. []Reduced participation in sport/recreational activities. Classification/Subgrouping:   [x]signs/symptoms consistent with neck pain with mobility deficits     []signs/symptoms consistent with neck pain with movement coordinated impairments    [x]signs/symptoms consistent with neck pain with radiating pain    [x]signs/symptoms consistent with neck pain with headaches (cervicogenic)    []Signs/symptoms consistent with nerve root involvement including myotome & dermatome dysfunction   []sign/symptoms consistent with facet dysfunction of cervical and thoracic spine    []signs/symptoms consistent suggesting central cord compression/UMN syndromes   []signs/symptoms consistent with discogenic cervical pain   []signs/symptoms consistent with rib dysfunction   []signs/symptoms consistent with postural dysfunction   []signs/symptoms consistent with shoulder pathology    []signs/symptoms consistent with post-surgical status including decreased ROM, strength and function.    []signs/symptoms consistent with pathology which may benefit from Dry Needling   []signs/symptoms which may limit the use of advanced manual therapy techniques: (Elevated CV risk profile, recent trauma, intolerance to end range positions, prior TIA, visual issues, UE neurological compromise )     Prognosis/Rehab Potential:      []Excellent   []Good    [x]Fair   []Poor    Tolerance of evaluation/treatment:    []Excellent   []Good    [x]Fair   []Poor    Physical Therapy Evaluation Complexity Justification  [x] A history of present problem with:  [] no personal factors and/or comorbidities that impact the plan of care;  []1-2 personal factors and/or comorbidities that impact the plan of care  [x]3 personal factors and/or comorbidities that impact the plan of care  [x] An examination of body systems using standardized tests and measures addressing any of the following: body structures and re-injury. [] Progressing: [] Met: [] Not Met: [] Adjusted  2. Patient will have a decrease in pain to facilitate improvement in movement, function, and ADLs as indicated by Functional Deficits. [] Progressing: [] Met: [] Not Met: [] Adjusted    Long Term Goals: To be achieved in:4 weeks  1. Disability index score of 40% or less for the NDI to assist with reaching prior level of function. [] Progressing: [] Met: [] Not Met: [] Adjusted  2. Patient will demonstrate increased AROM to [facility] of cervical/thoracic spine to allow for proper joint functioning as indicated by patients Functional Deficits. [] Progressing: [] Met: [] Not Met: [] Adjusted  3. Patient will demonstrate an increase in postural awareness and control and activation of  Deep cervical stabilizers to allow for proper functional mobility as indicated by patients Functional Deficits. [] Progressing: [] Met: [] Not Met: [] Adjusted  4. Patient will return to 60%functional activities without increased symptoms or restriction. [] Progressing: [] Met: [] Not Met: [] Adjusted  5. Pt will be able to rotate neck 10 degrees further.(patient specific functional goal)    [] Progressing: [] Met: [] Not Met: [] Adjusted         Electronically signed by:  Zack Ron PT      Note: If patient does not return for scheduled/recommended follow up visits, this note will serve as a discharge from care along with the most recent update on progress.

## 2021-01-27 NOTE — FLOWSHEET NOTE
[address] [place] [address]  Phone: [phone]   Fax:     [phone]    Physical Therapy Treatment Note/ Progress Report:     Date:  2021    Patient Name:  Nahomy Jorge    :  1980  MRN: [id number]    Pertinent Medical History: Additional Pertinent Hx: Independent    Medical/Treatment Diagnosis Information:  · Diagnosis: M54.81 (ICD-10-CM) - Occipital neuralgia  · Treatment Diagnosis: Pain in neck and headaches, limited mobility of cervical spine , weakness in UE's, tightness of cervical muscles    Insurance/Certification information:  PT Insurance Information: [place]  Physician Information:  Referring Practitioner: Regina Canada MD  Plan of care signed (Y/N): Sent to Dr. 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 She then had lumbar fusion surgery in . Spinal pain continued and worsened and she has had PT, aquatic therapy, chiropractic care, pain management, , and medication. Dr. Alexandra Simmons attempted to give pt injection on 21 and the muscles were too tight and she could not effectively give the injection. Dr. Alexandra Simmons referred her to PT for building  muscle strength , and relaxing muscle tissue. Relief is only temporary.     SUBJECTIVE:  See eval    OBJECTIVE: See eval   Observation:    Test measurements: RESTRICTIONS/PRECAUTIONS:   Exercises/Interventions:   Therapeutic Ex (19391)  Min: Resistance/Repetitions Notes   Dorsal glides Supine 5 sec x 10                                       Manual Intervention (49480)  Min:     Cerv mobs/manip 10 min including MET for rotation    Thoracic mobs/manip     CT manip     Rib mobilizations     STM 5 min         NMR re-education (79403)  Min:               Therapeutic Activity (93518)  Min:               Modalities  Min:      IFC with MHP 15 min to upper cervical area and on upper traps           Other Therapeutic Activities:  Pt was educated on PT POC, Diagnosis, Prognosis, pathomechanics as well as frequency and duration of scheduling future physical therapy appointments. Time was also taken on this day to answer all patient questions and participation in PT. Reviewed appointment policy in detail with patient and patient verbalized understanding. Home Exercise Program:    Therapeutic Exercise and NMR EXR  [x] (48397) Provided verbal/tactile cueing for activities related to strengthening, flexibility, endurance, ROM  for improvements in cervical, postural, scapular, scapulothoracic and UE control with self care, reaching, carrying, lifting, house/yardwork, driving/computer work.    [] (17976) Provided verbal/tactile cueing for activities related to improving balance, coordination, kinesthetic sense, posture, motor skill, proprioception  to assist with cervical, scapular, scapulothoracic and UE control with self care, reaching, carrying, lifting, house/yardwork, driving/computer work. Therapeutic Activities:    [] (31814 or 42152) Provided verbal/tactile cueing for activities related to improving balance, coordination, kinesthetic sense, posture, motor skill, proprioception and motor activation to allow for proper function of cervical, scapular, scapulothoracic and UE control with self care, carrying, lifting, driving/computer work.      Home Exercise Program:    [x] (83513) Reviewed/Progressed HEP activities related to strengthening, flexibility, endurance, ROM of cervical, scapular, scapulothoracic and UE control with self care, reaching, carrying, lifting, house/yardwork, driving/computer work  [] (42878) Reviewed/Progressed HEP activities related to improving balance, coordination, kinesthetic sense, posture, motor skill, proprioception of cervical, scapular, scapulothoracic and UE control with self care, reaching, carrying, lifting, house/yardwork, driving/computer work      Manual Treatments:  PROM / STM / Oscillations-Mobs:  G-I, II, III, IV (PA's, Inf., Post.)  [x] (65273) Provided manual therapy to mobilize soft tissue/joints of cervical/CT, scapular GHJ and UE for the purpose of decreasing headache, modulating pain, promoting relaxation,  increasing ROM, reducing/eliminating soft tissue swelling/inflammation/restriction, improving soft tissue extensibility and allowing for proper ROM for normal function with self care, reaching, carrying, lifting, house/yardwork, driving/computer work    If [facility] Please Indicate Time In/Out  CPT Code Time in Time out                                   Charges:  Timed Code Treatment Minutes: 15   Total Treatment Minutes: 60     [] EVAL (LOW) 29680 (typically 20 minutes face-to-face)  [] EVAL (MOD) 74062 (typically 30 minutes face-to-face)  [x] EVAL (HIGH) 82080 (typically 45 minutes face-to-face)  [] RE-EVAL     [] BA(71310) x     [] Dry needle 1 or 2 Muscles (60390)  [] NMR (89884) x     [] Dry needle 3+ Muscles (81006)  [x] Manual (95599) x   1  [] Ultrasound (31574) x  [] TA (01320) x     [x] Mech Traction (08762)  [] ES(attended) (90974)     [x] ES (un) (30751): 15 min  [] Vasopump (21775) [] Ionto (94575)   [] Other:    hCarli Barnett stated goal: \"Build muscle strength and relax muscle tension and range of motion\"  []? Progressing: []? Met: []? Not Met: []? Adjusted     Therapist goals for Patient:   Short Term Goals:  To be achieved in: 2 weeks  1. Independent in HEP and progression per patient tolerance, in order to prevent re-injury. []? Progressing: []? Met: []? Not Met: []? Adjusted  2. Patient will have a decrease in pain to facilitate improvement in movement, function, and ADLs as indicated by Functional Deficits. []? Progressing: []? Met: []? Not Met: []? Adjusted     Long Term Goals: To be achieved in:4 weeks  1. Disability index score of 40% or less for the NDI to assist with reaching prior level of function. []? Progressing: []? Met: []? Not Met: []? Adjusted  2. Patient will demonstrate increased AROM to [facility] of cervical/thoracic spine to allow for proper joint functioning as indicated by patients Functional Deficits. []? Progressing: []? Met: []? Not Met: []? Adjusted  3. Patient will demonstrate an increase in postural awareness and control and activation of  Deep cervical stabilizers to allow for proper functional mobility as indicated by patients Functional Deficits. []? Progressing: []? Met: []? Not Met: []? Adjusted  4. Patient will return to 60%functional activities without increased symptoms or restriction. []? Progressing: []? Met: []? Not Met: []? Adjusted  5. Pt will be able to rotate neck 10 degrees further.(patient specific functional goal)    []? Progressing: []? Met: []? Not Met: []? Adjusted               ASSESSMENT:  See eval    Treatment/Activity Tolerance:  [x] Patient tolerated treatment well [] Patient limited by fatique  [] Patient limited by pain  [] Patient limited by other medical complications  [] Other:     Overall Progression Towards Functional goals/ Treatment Progress Update:  [] Patient is progressing as expected towards functional goals listed. [] Progression is slowed due to complexities/Impairments listed. [] Progression has been slowed due to co-morbidities.   [x] Plan just implemented, too soon to assess goals progression <30days   [] Goals require adjustment due to lack of progress  [] Patient is not progressing as expected and requires additional follow up with physician  [] Other    Prognosis for POC: [x] Good [] Fair  [] Poor    Patient requires continued skilled intervention: [x] Yes  [] No        PLAN: See eval  [] Continue per plan of care [] Alter current plan (see comments)  [x] Plan of care initiated [] Hold pending MD visit [] Discharge    Electronically signed by: Kevin Crane PT    Note: If patient does not return for scheduled/recommended follow up visits, this note will serve as a discharge from care along with the most recent update on progress.

## 2021-02-03 ENCOUNTER — HOSPITAL ENCOUNTER (OUTPATIENT)
Dept: PHYSICAL THERAPY | Age: 41
Setting detail: THERAPIES SERIES
Discharge: HOME | End: 2021-02-03
Payer: COMMERCIAL

## 2021-02-03 PROCEDURE — 97110 THERAPEUTIC EXERCISES: CPT

## 2021-02-03 PROCEDURE — G0283 ELEC STIM OTHER THAN WOUND: HCPCS

## 2021-02-03 PROCEDURE — 97140 MANUAL THERAPY 1/> REGIONS: CPT

## 2021-02-03 NOTE — FLOWSHEET NOTE
[address] [facility] [place]. [address]  Phone: [phone]   Fax:     [phone]    Physical Therapy Treatment Note/ Progress Report:     Date:  2/3/2021    Patient Name:  Fan Whittington    :  1980  MRN: [id number]    Pertinent Medical History:      Medical/Treatment Diagnosis Information:  · Diagnosis: M54.81 (ICD-10-CM) - Occipital neuralgia  · Treatment Diagnosis: Pain in neck and headaches, limited mobility of cervical spine , weakness in UE's, tightness of cervical muscles    Insurance/Certification information:  PT Insurance Information: [place]  Physician Information:  Referring Practitioner: Gisselle Montes MD  Plan of care signed (Y/N): Sent to Dr. 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 She then had lumbar fusion surgery in . Spinal pain continued and worsened and she has had PT, aquatic therapy, chiropractic care, pain management, , and medication. Dr. Arlin Martinez attempted to give pt injection on 21 and the muscles were too tight and she could not effectively give the injection. Dr. Arlin Martinez referred her to PT for building  muscle strength , and relaxing muscle tissue. Relief is only temporary.     SUBJECTIVE:  See eval  2/3/21: Headaches are less, but spasms in LE's are worse at nights and weakness in both arms is worse.    OBJECTIVE: See eval   Observation:    Test measurements:      RESTRICTIONS/PRECAUTIONS:   Exercises/Interventions:   Therapeutic Ex (41415)  Min: Resistance/Repetitions Notes   Dorsal glides Supine 5 sec x 10    Scap retractions 5 sec x 8    UE ranger for scap mobs 15X on each    Calf stretch 3 x 30 sec To help with LE spasms   90/90 with ankle pumps 50X To help with LE spasms                  Manual Intervention (29859)  Min:     Cerv mobs/manip 10 min including MET for rotation    Thoracic mobs/manip     CT manip     Rib mobilizations     STM 5 min         NMR re-education (72759)  Min:               Therapeutic Activity (83450)  Min:               Modalities  Min:      IFC with MHP 15 min to upper cervical area and on upper traps           Other Therapeutic Activities:  Pt was educated on PT POC, Diagnosis, Prognosis, pathomechanics as well as frequency and duration of scheduling future physical therapy appointments. Time was also taken on this day to answer all patient questions and participation in PT. Reviewed appointment policy in detail with patient and patient verbalized understanding. Home Exercise Program: [address] access code [email or web address] given today. Therapeutic Exercise and NMR EXR  [x] (48833) Provided verbal/tactile cueing for activities related to strengthening, flexibility, endurance, ROM  for improvements in cervical, postural, scapular, scapulothoracic and UE control with self care, reaching, carrying, lifting, house/yardwork, driving/computer work.    [] (51970) Provided verbal/tactile cueing for activities related to improving balance, coordination, kinesthetic sense, posture, motor skill, proprioception  to assist with cervical, scapular, scapulothoracic and UE control with self care, reaching, carrying, lifting, house/yardwork, driving/computer work.     Therapeutic Activities:    [] (58681 or 51617) Provided verbal/tactile cueing for activities related to improving balance, coordination, kinesthetic sense, posture, motor skill, proprioception and motor activation to allow for proper function of cervical, scapular, scapulothoracic and UE control with self care, carrying, lifting, driving/computer work.      Home Exercise Program:    [x] (83803) Reviewed/Progressed HEP activities related to strengthening, flexibility, endurance, ROM of cervical, scapular, scapulothoracic and UE control with self care, reaching, carrying, lifting, house/yardwork, driving/computer work  [] (80690) Reviewed/Progressed HEP activities related to improving balance, coordination, kinesthetic sense, posture, motor skill, proprioception of cervical, scapular, scapulothoracic and UE control with self care, reaching, carrying, lifting, house/yardwork, driving/computer work      Manual Treatments:  PROM / STM / Oscillations-Mobs:  G-I, II, III, IV (PA's, Inf., Post.)  [x] (36325) Provided manual therapy to mobilize soft tissue/joints of cervical/CT, scapular GHJ and UE for the purpose of decreasing headache, modulating pain, promoting relaxation,  increasing ROM, reducing/eliminating soft tissue swelling/inflammation/restriction, improving soft tissue extensibility and allowing for proper ROM for normal function with self care, reaching, carrying, lifting, house/yardwork, driving/computer work    If [facility] Please Indicate Time In/Out  CPT Code Time in Time out                                   Charges:  Timed Code Treatment Minutes: 35   Total Treatment Minutes: 50     [] EVAL (LOW) 93372 (typically 20 minutes face-to-face)  [] EVAL (MOD) 95634 (typically 30 minutes face-to-face)  [] EVAL (HIGH) 87022 (typically 45 minutes face-to-face)  [] RE-EVAL     [x] IG(50150) x  1   [] Dry needle 1 or 2 Muscles (99949)  [] NMR (06177) x     [] Dry needle 3+ Muscles (31923)  [x] Manual (56700) x   1  [] Ultrasound (03368) x  [] TA (52831) x     [] Mech Traction (46268)  [] ES(attended) (13370)     [x] ES (un) (92920): 15 min  [] Vasopump (44047) [] Ionto (74674)   [] Other:    Reynaldo Carrel stated goal: \"Build muscle strength and relax muscle tension and range of motion\"  []? Progressing: []? Met: []? Not Met: []? Adjusted     Therapist goals for Patient:   Short Term Goals: To be achieved in: 2 weeks  1. Independent in HEP and progression per patient tolerance, in order to prevent re-injury. []? Progressing: []? Met: []? Not Met: []? Adjusted  2. Patient will have a decrease in pain to facilitate improvement in movement, function, and ADLs as indicated by Functional Deficits. []? Progressing: []? Met: []? Not Met: []? Adjusted     Long Term Goals: To be achieved in:4 weeks  1. Disability index score of 40% or less for the NDI to assist with reaching prior level of function. []? Progressing: []? Met: []? Not Met: []? Adjusted  2. Patient will demonstrate increased AROM to [facility] of cervical/thoracic spine to allow for proper joint functioning as indicated by patients Functional Deficits. []? Progressing: []? Met: []? Not Met: []? Adjusted  3. Patient will demonstrate an increase in postural awareness and control and activation of  Deep cervical stabilizers to allow for proper functional mobility as indicated by patients Functional Deficits. []? Progressing: []? Met: []? Not Met: []? Adjusted  4. Patient will return to 60%functional activities without increased symptoms or restriction. []? Progressing: []? Met: []? Not Met: []? Adjusted  5. Pt will be able to rotate neck 10 degrees further.(patient specific functional goal)    []? Progressing: []? Met: []? Not Met: []?  Adjusted               ASSESSMENT:  See eval    Treatment/Activity Tolerance:  [x] Patient tolerated treatment well [] Patient limited by fatique  [] Patient limited by pain  [] Patient limited by other medical complications  [] Other:     Overall Progression Towards Functional goals/ Treatment Progress Update:  [] Patient is progressing as expected towards functional

## 2021-02-05 ENCOUNTER — HOSPITAL ENCOUNTER (OUTPATIENT)
Dept: PHYSICAL THERAPY | Age: 41
Setting detail: THERAPIES SERIES
Discharge: HOME | End: 2021-02-05
Payer: COMMERCIAL

## 2021-02-05 PROCEDURE — 97140 MANUAL THERAPY 1/> REGIONS: CPT

## 2021-02-05 PROCEDURE — G0283 ELEC STIM OTHER THAN WOUND: HCPCS

## 2021-02-05 PROCEDURE — 97110 THERAPEUTIC EXERCISES: CPT

## 2021-02-05 NOTE — FLOWSHEET NOTE
[address] [facility] [place]. [address]  Phone: [phone]   Fax:     [phone]    Physical Therapy Treatment Note/ Progress Report:     Date:  2021    Patient Name:  José Miguel Nicholson    :  1980  MRN: [id number]    Pertinent Medical History:      Medical/Treatment Diagnosis Information:  · Diagnosis: M54.81 (ICD-10-CM) - Occipital neuralgia  · Treatment Diagnosis: Pain in neck and headaches, limited mobility of cervical spine , weakness in UE's, tightness of cervical muscles    Insurance/Certification information:  PT Insurance Information: [address] [place] [address] [facility]  Physician Information:  Referring Practitioner: Geoff Guido MD  Plan of care signed (Y/N): Sent to Dr. 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 She then had lumbar fusion surgery in . Spinal pain continued and worsened and she has had PT, aquatic therapy, chiropractic care, pain management, , and medication. Dr. Purnima Rinaldi attempted to give pt injection on 21 and the muscles were too tight and she could not effectively give the injection. Dr. Purnima Rinaldi referred her to PT for building  muscle strength , and relaxing muscle tissue. Relief is only temporary.     SUBJECTIVE:  See eval  2/3/21: Headaches are less, but spasms in LE's are worse at nights and weakness in both arms is worse.  02/05/21; Patient reports H/A are not as bad,sore on left side of the neck,spasms and weakness in her arms are about the same. OBJECTIVE: See eval   Observation:    Test measurements:      RESTRICTIONS/PRECAUTIONS:   Exercises/Interventions:   Therapeutic Ex (84453)  Min: Resistance/Repetitions Notes   Dorsal glides Supine 5 sec x 10    Scap retractions 5 sec x 8    UE ranger for scap mobs 15X on each    Calf stretch 3 x 30 sec To help with LE spasms   90/90 with ankle pumps 50X To help with LE spasms                  Manual Intervention (97748)  Min:     Cerv mobs/manip 10 min including MET for rotation    Thoracic mobs/manip     CT manip     Rib mobilizations     STM 5 min         NMR re-education (41472)  Min:               Therapeutic Activity (62140)  Min:               Modalities  Min:      IFC with MHP 15 min to upper cervical area and on upper traps           Other Therapeutic Activities:  Pt was educated on PT POC, Diagnosis, Prognosis, pathomechanics as well as frequency and duration of scheduling future physical therapy appointments. Time was also taken on this day to answer all patient questions and participation in PT. Reviewed appointment policy in detail with patient and patient verbalized understanding. Home Exercise Program: Girishdnaa Mckeon access code [email or web address] given today.     Therapeutic Exercise and NMR EXR  [x] (97076) Provided verbal/tactile cueing for activities related to strengthening, flexibility, endurance, ROM  for improvements in cervical, postural, scapular, scapulothoracic and UE control with self care, reaching, carrying, lifting, house/yardwork, driving/computer work.    [] (77135) Provided verbal/tactile cueing for activities related to improving balance, coordination, kinesthetic sense, posture, motor skill, proprioception  to assist with cervical, scapular, scapulothoracic and UE control with self care, reaching, carrying, lifting, house/yardwork, driving/computer work.    Therapeutic Activities:    [] (27580 or 39281) Provided verbal/tactile cueing for activities related to improving balance, coordination, kinesthetic sense, posture, motor skill, proprioception and motor activation to allow for proper function of cervical, scapular, scapulothoracic and UE control with self care, carrying, lifting, driving/computer work.      Home Exercise Program:    [x] (34101) Reviewed/Progressed HEP activities related to strengthening, flexibility, endurance, ROM of cervical, scapular, scapulothoracic and UE control with self care, reaching, carrying, lifting, house/yardwork, driving/computer work  [] (21974) Reviewed/Progressed HEP activities related to improving balance, coordination, kinesthetic sense, posture, motor skill, proprioception of cervical, scapular, scapulothoracic and UE control with self care, reaching, carrying, lifting, house/yardwork, driving/computer work      Manual Treatments:  PROM / STM / Oscillations-Mobs:  G-I, II, III, IV (PA's, Inf., Post.)  [x] (54229) Provided manual therapy to mobilize soft tissue/joints of cervical/CT, scapular GHJ and UE for the purpose of decreasing headache, modulating pain, promoting relaxation,  increasing ROM, reducing/eliminating soft tissue swelling/inflammation/restriction, improving soft tissue extensibility and allowing for proper ROM for normal function with self care, reaching, carrying, lifting, house/yardwork, driving/computer work    If [facility] Please Indicate Time In/Out  CPT Code Time in Time out                                   Charges:  Timed Code Treatment Minutes: 35   Total Treatment Minutes: 50     [] EVAL (LOW) 99627 (typically 20 minutes face-to-face)  [] EVAL (MOD) 51143 (typically 30 minutes face-to-face)  [] EVAL (HIGH) 91756 (typically 45 minutes face-to-face)  [] RE-EVAL     [x] WA(81224) x  1   [] Dry needle 1 or 2 Muscles (43717)  [] NMR (46953) x     [] Dry needle 3+ Muscles (08832)  [x] Manual (24121) x   1 [] Ultrasound (84947) x  [] TA (36732) x     [] Mech Traction (96502)  [] ES(attended) (97361)     [x] ES (un) (75655): 15 min  [] Vasopump (13065) [] Ionto (36516)   [] Other:    Charli Barnett stated goal: \"Build muscle strength and relax muscle tension and range of motion\"  []? Progressing: []? Met: []? Not Met: []? Adjusted     Therapist goals for Patient:   Short Term Goals: To be achieved in: 2 weeks  1. Independent in HEP and progression per patient tolerance, in order to prevent re-injury. []? Progressing: []? Met: []? Not Met: []? Adjusted  2. Patient will have a decrease in pain to facilitate improvement in movement, function, and ADLs as indicated by Functional Deficits. []? Progressing: []? Met: []? Not Met: []? Adjusted     Long Term Goals: To be achieved in:4 weeks  1. Disability index score of 40% or less for the NDI to assist with reaching prior level of function. []? Progressing: []? Met: []? Not Met: []? Adjusted  2. Patient will demonstrate increased AROM to [facility] of cervical/thoracic spine to allow for proper joint functioning as indicated by patients Functional Deficits. []? Progressing: []? Met: []? Not Met: []? Adjusted  3. Patient will demonstrate an increase in postural awareness and control and activation of  Deep cervical stabilizers to allow for proper functional mobility as indicated by patients Functional Deficits. []? Progressing: []? Met: []? Not Met: []? Adjusted  4. Patient will return to 60%functional activities without increased symptoms or restriction. []? Progressing: []? Met: []? Not Met: []? Adjusted  5. Pt will be able to rotate neck 10 degrees further.(patient specific functional goal)    []? Progressing: []? Met: []? Not Met: []?  Adjusted               ASSESSMENT:  See eval    Treatment/Activity Tolerance:  [x] Patient tolerated treatment well [] Patient limited by fatique  [] Patient limited by pain  [] Patient limited by other medical complications  [] Other: Overall Progression Towards Functional goals/ Treatment Progress Update:  [] Patient is progressing as expected towards functional goals listed. [] Progression is slowed due to complexities/Impairments listed. [] Progression has been slowed due to co-morbidities. [x] Plan just implemented, too soon to assess goals progression <30days   [] Goals require adjustment due to lack of progress  [] Patient is not progressing as expected and requires additional follow up with physician  [] Other    Prognosis for POC: [x] Good [] Fair  [] Poor    Patient requires continued skilled intervention: [x] Yes  [] No        PLAN: See eval  [x] Continue per plan of care [] Alter current plan (see comments)  [] Plan of care initiated [] Hold pending MD visit [] Discharge    Electronically signed by: Benito Sheppard PTA    Note: If patient does not return for scheduled/recommended follow up visits, this note will serve as a discharge from care along with the most recent update on progress.

## 2021-02-08 ENCOUNTER — HOSPITAL ENCOUNTER (OUTPATIENT)
Dept: PHYSICAL THERAPY | Age: 41
Setting detail: THERAPIES SERIES
Discharge: HOME | End: 2021-02-08
Payer: COMMERCIAL

## 2021-02-08 PROCEDURE — 97012 MECHANICAL TRACTION THERAPY: CPT

## 2021-02-08 PROCEDURE — 97110 THERAPEUTIC EXERCISES: CPT

## 2021-02-08 PROCEDURE — 97140 MANUAL THERAPY 1/> REGIONS: CPT

## 2021-02-08 NOTE — FLOWSHEET NOTE
[address] [facility] [place]. Roman Rooney [address]  Phone: [phone]   Fax:     [phone]    Physical Therapy Treatment Note/ Progress Report:     Date:  2021    Patient Name:  Milton Ramirez    :  1980  MRN: [id number]    Pertinent Medical History:      Medical/Treatment Diagnosis Information:  · Diagnosis: M54.81 (ICD-10-CM) - Occipital neuralgia  · Treatment Diagnosis: Pain in neck and headaches, limited mobility of cervical spine , weakness in UE's, tightness of cervical muscles    Insurance/Certification information:  PT Insurance Information: [address] [place] [address] [employer] [facility]  Physician Information:  Referring Practitioner: Anahi Maloney MD  Plan of care signed (Y/N): Sent to Dr. 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 She then had lumbar fusion surgery in . Spinal pain continued and worsened and she has had PT, aquatic therapy, chiropractic care, pain management, , and medication. Dr. Es Maynard attempted to give pt injection on 21 and the muscles were too tight and she could not effectively give the injection. Dr. Es Maynard referred her to PT for building  muscle strength , and relaxing muscle tissue. Relief is only temporary.     SUBJECTIVE:  See eval  2/3/21: Headaches are less, but spasms in LE's are worse at nights and weakness in both arms is worse.  02/05/21; Patient reports H/A are not as bad,sore on left side of the neck,spasms and weakness in her arms are about the same. 2/8/21: Pt reports that she was ok through most of the day on Saturday and around 4:00 PM she had increase pain on left side of head, headaches and arm pain and soreness too. She took a muscle relaxer last night, and she slept well last night but when she took Gabapentin and muscle relaxer on Saturday it made no difference. She lifted a few things between 12:00 and 3:00 on Saturday. (This may be the activity that caused increase of pain.)    OBJECTIVE: See eval   Observation:    Test measurements:      RESTRICTIONS/PRECAUTIONS:   Exercises/Interventions:   Therapeutic Ex (89642)  Min: Resistance/Repetitions Notes   Dorsal glides Supine 5 sec x 10    Scap retractions 5 sec x 8    UE ranger for scap mobs 15X on each    Calf stretch 3 x 30 sec To help with LE spasms   90/90 with ankle pumps 50X To help with LE spasms                  Manual Intervention (63020)  Min:     Cerv mobs/manip 20 min including MET for rotation    Thoracic mobs/manip Prone for thoracic trigger points release 5 min    CT manip     Rib mobilizations     STM 5 min         NMR re-education (58083)  Min:     Leny Benavides Demonstrated and practiced with theracane on trigger points         Therapeutic Activity (08589)  Min:               Modalities  Min:         Int Cervical traction with MHP 15# x 15 min       Other Therapeutic Activities:  Pt was educated on PT POC, Diagnosis, Prognosis, pathomechanics as well as frequency and duration of scheduling future physical therapy appointments. Time was also taken on this day to answer all patient questions and participation in PT. Reviewed appointment policy in detail with patient and patient verbalized understanding. 2/8/21: Pt felt well with theracane trial and PT wrote the name for her to look into for possible purchase, if pt so desires.     Home Exercise Program: [employer] access code [id number] given today. Therapeutic Exercise and NMR EXR  [x] (91919) Provided verbal/tactile cueing for activities related to strengthening, flexibility, endurance, ROM  for improvements in cervical, postural, scapular, scapulothoracic and UE control with self care, reaching, carrying, lifting, house/yardwork, driving/computer work.    [] (46992) Provided verbal/tactile cueing for activities related to improving balance, coordination, kinesthetic sense, posture, motor skill, proprioception  to assist with cervical, scapular, scapulothoracic and UE control with self care, reaching, carrying, lifting, house/yardwork, driving/computer work. Therapeutic Activities:    [] (73636 or 33148) Provided verbal/tactile cueing for activities related to improving balance, coordination, kinesthetic sense, posture, motor skill, proprioception and motor activation to allow for proper function of cervical, scapular, scapulothoracic and UE control with self care, carrying, lifting, driving/computer work.      Home Exercise Program:    [x] (26476) Reviewed/Progressed HEP activities related to strengthening, flexibility, endurance, ROM of cervical, scapular, scapulothoracic and UE control with self care, reaching, carrying, lifting, house/yardwork, driving/computer work  [] (10547) Reviewed/Progressed HEP activities related to improving balance, coordination, kinesthetic sense, posture, motor skill, proprioception of cervical, scapular, scapulothoracic and UE control with self care, reaching, carrying, lifting, house/yardwork, driving/computer work      Manual Treatments:  PROM / STM / Oscillations-Mobs:  G-I, II, III, IV (PA's, Inf., Post.)  [x] (96831) Provided manual therapy to mobilize soft tissue/joints of cervical/CT, scapular GHJ and UE for the purpose of decreasing headache, modulating pain, promoting relaxation,  increasing ROM, reducing/eliminating soft tissue swelling/inflammation/restriction, improving soft tissue extensibility and allowing for proper ROM for normal function with self care, reaching, carrying, lifting, house/yardwork, driving/computer work    If [facility] Please Indicate Time In/Out  CPT Code Time in Time out                                   Charges:  Timed Code Treatment Minutes: 45   Total Treatment Minutes: 60     [] EVAL (LOW) 45860 (typically 20 minutes face-to-face)  [] EVAL (MOD) 12435 (typically 30 minutes face-to-face)  [] EVAL (HIGH) 47558 (typically 45 minutes face-to-face)  [] RE-EVAL     [x] OO(22236) x  1   [] Dry needle 1 or 2 Muscles (48705)  [] NMR (38372) x     [] Dry needle 3+ Muscles (48010)  [x] Manual (52820) x  2  [] Ultrasound (88778) x  [] TA (42550) x     [x] Mech Traction (63615) 15 min  [] ES(attended) (59921)     [] ES (un) (08238): 15 min  [] Vasopump (36834) [] Ionto (69094)   [] Other:    Mary Ruiz stated goal: \"Build muscle strength and relax muscle tension and range of motion\"  []? Progressing: []? Met: []? Not Met: []? Adjusted     Therapist goals for Patient:   Short Term Goals: To be achieved in: 2 weeks  1. Independent in HEP and progression per patient tolerance, in order to prevent re-injury. []? Progressing: []? Met: []? Not Met: []? Adjusted  2. Patient will have a decrease in pain to facilitate improvement in movement, function, and ADLs as indicated by Functional Deficits. []? Progressing: []? Met: []? Not Met: []? Adjusted     Long Term Goals: To be achieved in:4 weeks  1. Disability index score of 40% or less for the NDI to assist with reaching prior level of function. []? Progressing: []? Met: []? Not Met: []? Adjusted  2. Patient will demonstrate increased AROM to [facility] of cervical/thoracic spine to allow for proper joint functioning as indicated by patients Functional Deficits. []? Progressing: []? Met: []? Not Met: []? Adjusted  3.  Patient will demonstrate an increase in postural awareness and control and activation of  Deep cervical stabilizers to allow for proper functional mobility as indicated by patients Functional Deficits. []? Progressing: []? Met: []? Not Met: []? Adjusted  4. Patient will return to 60%functional activities without increased symptoms or restriction. []? Progressing: []? Met: []? Not Met: []? Adjusted  5. Pt will be able to rotate neck 10 degrees further.(patient specific functional goal)    []? Progressing: []? Met: []? Not Met: []? Adjusted               ASSESSMENT:  See eval    Treatment/Activity Tolerance:  [x] Patient tolerated treatment well [] Patient limited by fatique  [] Patient limited by pain  [] Patient limited by other medical complications  [] Other:     Overall Progression Towards Functional goals/ Treatment Progress Update:  [] Patient is progressing as expected towards functional goals listed. [] Progression is slowed due to complexities/Impairments listed. [] Progression has been slowed due to co-morbidities. [x] Plan just implemented, too soon to assess goals progression <30days   [] Goals require adjustment due to lack of progress  [] Patient is not progressing as expected and requires additional follow up with physician  [] Other    Prognosis for POC: [x] Good [] Fair  [] Poor    Patient requires continued skilled intervention: [x] Yes  [] No        PLAN:  Monitor effect of traction, and then may alternate between traction and e stim, depending on results. [x] Continue per plan of care [] Alter current plan (see comments)  [] Plan of care initiated [] Hold pending MD visit [] Discharge    Electronically signed by: Karime Esposito, PT    Note: If patient does not return for scheduled/recommended follow up visits, this note will serve as a discharge from care along with the most recent update on progress.

## 2021-02-10 RX ORDER — PREDNISONE 10 MG/1
TABLET ORAL
Qty: 16 TABLET | Refills: 0 | Status: SHIPPED | OUTPATIENT
Start: 2021-02-10 | End: 2021-03-19

## 2021-02-11 ENCOUNTER — HOSPITAL ENCOUNTER (OUTPATIENT)
Dept: PHYSICAL THERAPY | Age: 41
Setting detail: THERAPIES SERIES
Discharge: HOME | End: 2021-02-11
Payer: COMMERCIAL

## 2021-02-11 PROCEDURE — 97035 APP MDLTY 1+ULTRASOUND EA 15: CPT

## 2021-02-11 PROCEDURE — 97140 MANUAL THERAPY 1/> REGIONS: CPT

## 2021-02-11 NOTE — FLOWSHEET NOTE
weakness in both arms is worse. 02/05/21; Patient reports H/A are not as bad,sore on left side of the neck,spasms and weakness in her arms are about the same. 2/8/21: Pt reports that she was ok through most of the day on Saturday and around 4:00 PM she had increase pain on left side of head, headaches and arm pain and soreness too. She took a muscle relaxer last night, and she slept well last night but when she took Gabapentin and muscle relaxer on Saturday it made no difference. She lifted a few things between 12:00 and 3:00 on Saturday. (This may be the activity that caused increase of pain.)  2/11/21: Pt reports that she has tried heat, TENS unit and ice, rest and taking Neurontin. She started the  Prednisone yesterday afternoon. She has been worse, but is a little better today. OBJECTIVE: See eval   Observation:    Test measurements:      RESTRICTIONS/PRECAUTIONS:   Exercises/Interventions:   Therapeutic Ex (77005)  Min: Resistance/Repetitions Notes   Supine 5 sec x 10    5 sec x 8    15X on each    3 x 30 sec To help with LE spasms   50X To help with LE spasms                  Manual Intervention (27276)  Min:     Cerv mobs/manip 20 min including MET for rotation    Thoracic mobs/manip     CT manip     Rib mobilizations     STM 5 min         NMR re-education (70818)  Min:     Beverly Nunes Demonstrated and practiced with theracane on trigger points         Therapeutic Activity (56730)  Min:               Modalities  Min:      [id number] min at 1.5 W/ccm2 while prone      15# x 15 min       Other Therapeutic Activities:  Pt was educated on PT POC, Diagnosis, Prognosis, pathomechanics as well as frequency and duration of scheduling future physical therapy appointments. Time was also taken on this day to answer all patient questions and participation in PT. Reviewed appointment policy in detail with patient and patient verbalized understanding.  2/8/21: Pt felt well with theracane trial and PT wrote the name for her to look into for possible purchase, if pt so desires. 2/11/21: Jaci Libman, PT spoke with pt and educated her about dry needling as a possible treatment. This therapist called Dr. Julieta Neri about this treatment and asked registrar to inquire about insurance coverage. Home Exercise Program: Zachary Pickard access code [email or web address] given today. Therapeutic Exercise and NMR EXR  [x] (07684) Provided verbal/tactile cueing for activities related to strengthening, flexibility, endurance, ROM  for improvements in cervical, postural, scapular, scapulothoracic and UE control with self care, reaching, carrying, lifting, house/yardwork, driving/computer work.    [] (46493) Provided verbal/tactile cueing for activities related to improving balance, coordination, kinesthetic sense, posture, motor skill, proprioception  to assist with cervical, scapular, scapulothoracic and UE control with self care, reaching, carrying, lifting, house/yardwork, driving/computer work. Therapeutic Activities:    [] (64956 or 25837) Provided verbal/tactile cueing for activities related to improving balance, coordination, kinesthetic sense, posture, motor skill, proprioception and motor activation to allow for proper function of cervical, scapular, scapulothoracic and UE control with self care, carrying, lifting, driving/computer work.      Home Exercise Program:    [x] (06209) Reviewed/Progressed HEP activities related to strengthening, flexibility, endurance, ROM of cervical, scapular, scapulothoracic and UE control with self care, reaching, carrying, lifting, house/yardwork, driving/computer work  [] (63969) Reviewed/Progressed HEP activities related to improving balance, coordination, kinesthetic sense, posture, motor skill, proprioception of cervical, scapular, scapulothoracic and UE control with self care, reaching, carrying, lifting, house/yardwork, driving/computer work      Manual Treatments:  PROM / STM / Oscillations-Mobs:  G-I, II, will serve as a discharge from care along with the most recent update on progress.

## 2021-02-15 ENCOUNTER — HOSPITAL ENCOUNTER (OUTPATIENT)
Dept: PHYSICAL THERAPY | Age: 41
Setting detail: THERAPIES SERIES
Discharge: HOME | End: 2021-02-15
Payer: COMMERCIAL

## 2021-02-15 PROCEDURE — 97140 MANUAL THERAPY 1/> REGIONS: CPT

## 2021-02-15 PROCEDURE — 97035 APP MDLTY 1+ULTRASOUND EA 15: CPT

## 2021-02-15 NOTE — FLOWSHEET NOTE
[address] [facility] [place]. [address] [place] [address]  Phone: [phone]   Fax:     [phone]    Physical Therapy Treatment Note/ Progress Report:     Date:  2/15/2021    Patient Name:  Mandy Simons    :  1980  MRN: [id number]    Pertinent Medical History:      Medical/Treatment Diagnosis Information:  · Diagnosis: M54.81 (ICD-10-CM) - Occipital neuralgia  · Treatment Diagnosis: Pain in neck and headaches, limited mobility of cervical spine , weakness in UE's, tightness of cervical muscles    Insurance/Certification information:  PT Insurance Information: [place]  Physician Information:  Referring Practitioner: Aishwarya Winn MD  Plan of care signed (Y/N): Sent to Dr. 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 She then had lumbar fusion surgery in . Spinal pain continued and worsened and she has had PT, aquatic therapy, chiropractic care, pain management, , and medication. Dr. Armond Colorado attempted to give pt injection on 21 and the muscles were too tight and she could not effectively give the injection. Dr. Armond Colorado referred her to PT for building  muscle strength , and relaxing muscle tissue. Relief is only temporary.     SUBJECTIVE:  See eval  2/3/21: Headaches are less, but spasms in LE's are worse at nights and weakness in both arms is worse. 02/05/21; Patient reports H/A are not as bad,sore on left side of the neck,spasms and weakness in her arms are about the same. 2/8/21: Pt reports that she was ok through most of the day on Saturday and around 4:00 PM she had increase pain on left side of head, headaches and arm pain and soreness too. She took a muscle relaxer last night, and she slept well last night but when she took Gabapentin and muscle relaxer on Saturday it made no difference. She lifted a few things between 12:00 and 3:00 on Saturday. (This may be the activity that caused increase of pain.)  2/11/21: Pt reports that she has tried heat, TENS unit and ice, rest and taking Neurontin. She started the  Prednisone yesterday afternoon. She has been worse, but is a little better today. 02/15/21: Patient reports she has been feeling a little better since she started taking Prednisone. States her H/A and tingling down her arms has been about the same. OBJECTIVE: See eval   Observation:    Test measurements:      RESTRICTIONS/PRECAUTIONS:   Exercises/Interventions:   Therapeutic Ex (38430)  Min: Resistance/Repetitions Notes   Supine 5 sec x 10    5 sec x 8    15X on each    3 x 30 sec To help with LE spasms   50X To help with LE spasms                  Manual Intervention (49172)  Min:     Cerv mobs/manip 20 min including MET for rotation    Thoracic mobs/manip     CT manip     Rib mobilizations     STM 5 min         NMR re-education (50176)  Min:     George Connors Demonstrated and practiced with danial on trigger points         Therapeutic Activity (90630)  Min:               Modalities  Min:      [id number] min at 1.5 W/ccm2 while prone      15# x 15 min       Other Therapeutic Activities:  Pt was educated on PT POC, Diagnosis, Prognosis, pathomechanics as well as frequency and duration of scheduling future physical therapy appointments.  Time was also taken on this day to answer all patient questions and participation in PT. Reviewed appointment policy in detail with patient and patient verbalized understanding. 2/8/21: Pt felt well with theracane trial and PT wrote the name for her to look into for possible purchase, if pt so desires. 2/11/21: Mlely Norman, PT spoke with pt and educated her about dry needling as a possible treatment. This therapist called Dr. Vanda Capellan about this treatment and asked registrar to inquire about insurance coverage. Home Exercise Program: [facility] access code [email or web address] given today. Therapeutic Exercise and NMR EXR  [x] (34753) Provided verbal/tactile cueing for activities related to strengthening, flexibility, endurance, ROM  for improvements in cervical, postural, scapular, scapulothoracic and UE control with self care, reaching, carrying, lifting, house/yardwork, driving/computer work.    [] (10880) Provided verbal/tactile cueing for activities related to improving balance, coordination, kinesthetic sense, posture, motor skill, proprioception  to assist with cervical, scapular, scapulothoracic and UE control with self care, reaching, carrying, lifting, house/yardwork, driving/computer work. Therapeutic Activities:    [] (85934 or 09485) Provided verbal/tactile cueing for activities related to improving balance, coordination, kinesthetic sense, posture, motor skill, proprioception and motor activation to allow for proper function of cervical, scapular, scapulothoracic and UE control with self care, carrying, lifting, driving/computer work.      Home Exercise Program:    [x] (96968) Reviewed/Progressed HEP activities related to strengthening, flexibility, endurance, ROM of cervical, scapular, scapulothoracic and UE control with self care, reaching, carrying, lifting, house/yardwork, driving/computer work  [] (30588) Reviewed/Progressed HEP activities related to improving balance, coordination, kinesthetic sense, posture, motor skill, proprioception of cervical, scapular, scapulothoracic and UE control with self care, reaching, carrying, lifting, house/yardwork, driving/computer work      Manual Treatments:  PROM / STM / Oscillations-Mobs:  G-I, II, III, IV (PA's, Inf., Post.)  [x] (59253) Provided manual therapy to mobilize soft tissue/joints of cervical/CT, scapular GHJ and UE for the purpose of decreasing headache, modulating pain, promoting relaxation,  increasing ROM, reducing/eliminating soft tissue swelling/inflammation/restriction, improving soft tissue extensibility and allowing for proper ROM for normal function with self care, reaching, carrying, lifting, house/yardwork, driving/computer work    If [facility] Please Indicate Time In/Out  CPT Code Time in Time out                                   Charges:  Timed Code Treatment Minutes: 45   Total Treatment Minutes: 60     [] EVAL (LOW) 15597 (typically 20 minutes face-to-face)  [] EVAL (MOD) 99234 (typically 30 minutes face-to-face)  [] EVAL (HIGH) 48061 (typically 45 minutes face-to-face)  [] RE-EVAL     [] DY(94843) x  1   [] Dry needle 1 or 2 Muscles (78284)  [] NMR (74992) x     [] Dry needle 3+ Muscles (84370)  [x] Manual (70853) x  2  [x] Ultrasound (56186) x 10 min  [] TA (23974) x     [] Mech Traction (23802) 15 min  [] ES(attended) (20108)     [] ES (un) (41893): 15 min  [] Vasopump (54438) [] Ionto (53962)   [] Other:    No Nine stated goal: \"Build muscle strength and relax muscle tension and range of motion\"  []? Progressing: []? Met: []? Not Met: []? Adjusted     Therapist goals for Patient:   Short Term Goals: To be achieved in: 2 weeks  1. Independent in HEP and progression per patient tolerance, in order to prevent re-injury. []? Progressing: []? Met: []? Not Met: []? Adjusted  2. Patient will have a decrease in pain to facilitate improvement in movement, function, and ADLs as indicated by Functional Deficits. []? Progressing: []? Met: []? Not Met: []? Adjusted     Long Term Goals:  To be achieved in:4 weeks  1. Disability index score of 40% or less for the NDI to assist with reaching prior level of function. []? Progressing: []? Met: []? Not Met: []? Adjusted  2. Patient will demonstrate increased AROM to [facility] of cervical/thoracic spine to allow for proper joint functioning as indicated by patients Functional Deficits. []? Progressing: []? Met: []? Not Met: []? Adjusted  3. Patient will demonstrate an increase in postural awareness and control and activation of  Deep cervical stabilizers to allow for proper functional mobility as indicated by patients Functional Deficits. []? Progressing: []? Met: []? Not Met: []? Adjusted  4. Patient will return to 60%functional activities without increased symptoms or restriction. []? Progressing: []? Met: []? Not Met: []? Adjusted  5. Pt will be able to rotate neck 10 degrees further.(patient specific functional goal)    []? Progressing: []? Met: []? Not Met: []? Adjusted               ASSESSMENT:  See eval    Treatment/Activity Tolerance:  [x] Patient tolerated treatment well [] Patient limited by fatique  [] Patient limited by pain  [] Patient limited by other medical complications  [] Other:     Overall Progression Towards Functional goals/ Treatment Progress Update:  [] Patient is progressing as expected towards functional goals listed. [] Progression is slowed due to complexities/Impairments listed. [] Progression has been slowed due to co-morbidities. [x] Plan just implemented, too soon to assess goals progression <30days   [] Goals require adjustment due to lack of progress  [] Patient is not progressing as expected and requires additional follow up with physician  [] Other    Prognosis for POC: [x] Good [] Fair  [] Poor    Patient requires continued skilled intervention: [x] Yes  [] No        PLAN:  Follow up with Dr. Joceline Sanchez and insurance about dry needling.     [x] Continue per plan of care [] Alter current plan (see comments)  [] Plan of care initiated [] Hold pending MD visit [] Discharge    Electronically signed by: Shahana Guillermo PTA    Note: If patient does not return for scheduled/recommended follow up visits, this note will serve as a discharge from care along with the most recent update on progress.

## 2021-02-17 ENCOUNTER — HOSPITAL ENCOUNTER (OUTPATIENT)
Dept: PHYSICAL THERAPY | Age: 41
Setting detail: THERAPIES SERIES
Discharge: HOME | End: 2021-02-17
Payer: COMMERCIAL

## 2021-02-17 NOTE — FLOWSHEET NOTE
Physical Therapy  Cancellation/No-show Note  Patient Name:  Hi Nesbitt  :  1980   Date:  2021  Cancelled visits to date: 1  No-shows to date: 0    For today's appointment patient:  [x]  Cancelled  []  Rescheduled appointment  []  No-show     Reason given by patient:  []  Patient ill  []  Conflicting appointment  []  No transportation    []  Conflict with work  []  No reason given  [x]  Other:     Comments:  Same day cancellation due to weather    Electronically signed by:  Prakash Sanchez PT

## 2021-02-19 ENCOUNTER — HOSPITAL ENCOUNTER (OUTPATIENT)
Dept: PHYSICAL THERAPY | Age: 41
Setting detail: THERAPIES SERIES
Discharge: HOME | End: 2021-02-19
Payer: COMMERCIAL

## 2021-02-19 PROCEDURE — 97035 APP MDLTY 1+ULTRASOUND EA 15: CPT

## 2021-02-19 PROCEDURE — 97140 MANUAL THERAPY 1/> REGIONS: CPT

## 2021-02-19 NOTE — PROGRESS NOTES
Physical Therapy     [facility] [address]  Phone: [phone]   Fax:     [phone]     Physical Therapy Treatment Note/ Progress Report:      Date:  2021     Patient Name:  Fan Whittington                       :  1980                   MRN: [id number]     Pertinent Medical History:       Medical/Treatment Diagnosis Information:  · Diagnosis: M54.81 (ICD-10-CM) - Occipital neuralgia  · Treatment Diagnosis: Pain in neck and headaches, limited mobility of cervical spine , weakness in UE's, tightness of cervical muscles     Insurance/Certification information:  PT Insurance Information: [place]  Physician Information:  Referring Practitioner: Gisselle Montes MD  Plan of care signed (Y/N): Sent to Dr. Arlin Martinez on 21        Dear Dr. Arlin Martinez,    Patient has been under our care for physical therapy since 2021. She is interested in returning to work soon. She has asked for our recommendations for return to work restrictions. Recommendations are as follows, at your discretion:    · 4 hours per day for the first two weeks  · No overhead lifting  · No lifting greater than 10 #  · No bending and twisting    Patient will see you for a follow-up appt on . Our hope is that patient will have pain at 3/10 or less by that time and that she will have completed a few successful sessions of dry needling prior to her return to work. Thank you for your referral.  Please feel free to contact me with any questions.     Johanny Carr PT.

## 2021-02-19 NOTE — FLOWSHEET NOTE
[address] [facility] [address]  Phone: [phone]   Fax:     [phone]    Physical Therapy Treatment Note/ Progress Report:     Date:  2021    Patient Name:  Marco Rockwell    :  1980  MRN: [id number]    Pertinent Medical History:      Medical/Treatment Diagnosis Information:  · Diagnosis: M54.81 (ICD-10-CM) - Occipital neuralgia  · Treatment Diagnosis: Pain in neck and headaches, limited mobility of cervical spine , weakness in UE's, tightness of cervical muscles    Insurance/Certification information:  PT Insurance Information: [place]  Physician Information:  Referring Practitioner: Chris Novak MD  Plan of care signed (Y/N): Sent to Dr. 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 She then had lumbar fusion surgery in . Spinal pain continued and worsened and she has had PT, aquatic therapy, chiropractic care, pain management, , and medication. Dr. Ila Adam attempted to give pt injection on 21 and the muscles were too tight and she could not effectively give the injection. Dr. Ila Adam referred her to PT for building  muscle strength , and relaxing muscle tissue. Relief is only temporary.     SUBJECTIVE:  See eval  2/3/21: Headaches are less, but spasms in LE's are worse at nights and weakness in both arms is worse. 02/05/21; Patient reports H/A are not as bad,sore on left side of the neck,spasms and weakness in her arms are about the same. 2/8/21: Pt reports that she was ok through most of the day on Saturday and around 4:00 PM she had increase pain on left side of head, headaches and arm pain and soreness too. She took a muscle relaxer last night, and she slept well last night but when she took Gabapentin and muscle relaxer on Saturday it made no difference. She lifted a few things between 12:00 and 3:00 on Saturday. (This may be the activity that caused increase of pain.)  2/11/21: Pt reports that she has tried heat, TENS unit and ice, rest and taking Neurontin. She started the  Prednisone yesterday afternoon. She has been worse, but is a little better today. 02/15/21: Patient reports she has been feeling a little better since she started taking Prednisone. States her H/A and tingling down her arms has been about the same. 2/19/21: Pt reports that her neck  and lumbar pain switch back between 4-5/10. She has completed predinsone and it is not too bad.     OBJECTIVE: See eval   Observation:    Test measurements:      RESTRICTIONS/PRECAUTIONS:   Exercises/Interventions:   Therapeutic Ex (65800)  Min: Resistance/Repetitions Notes   Supine 5 sec x 10    5 sec x 8    15X on each    3 x 30 sec To help with LE spasms   50X To help with LE spasms                  Manual Intervention (68539)  Min:     Cerv mobs/manip 20 min including MET for rotation    Thoracic mobs/manip Prone for thoracic trigger points release 5 min    CT manip     Rib mobilizations     STM 5 min         NMR re-education (58501)  Min:     Aleksander Miller Demonstrated and practiced with theracane on trigger points         Therapeutic Activity (56787)  Min:               Modalities  Min:      Tacricoxjo27 min at 1.5 W/ccm2 while prone            Other Therapeutic Activities:  Pt was educated on PT POC, Diagnosis, Prognosis, pathomechanics as well as frequency and duration of scheduling future physical therapy appointments. Time was also taken on this day to answer all patient questions and participation in PT. Reviewed appointment policy in detail with patient and patient verbalized understanding. 2/8/21: Pt felt well with theracane trial and PT wrote the name for her to look into for possible purchase, if pt so desires. 2/11/21: Bel Murray, PT spoke with pt and educated her about dry needling as a possible treatment. This therapist called Dr. Arlin Martinez about this treatment and asked registrar to inquire about insurance coverage. 2/19/21: Gave pt further information to consider regarding dry needling treatment. [facility]: [id number] given today. Therapeutic Exercise and NMR EXR  [x] (36054) Provided verbal/tactile cueing for activities related to strengthening, flexibility, endurance, ROM  for improvements in cervical, postural, scapular, scapulothoracic and UE control with self care, reaching, carrying, lifting, house/yardwork, driving/computer work.    [] (10663) Provided verbal/tactile cueing for activities related to improving balance, coordination, kinesthetic sense, posture, motor skill, proprioception  to assist with cervical, scapular, scapulothoracic and UE control with self care, reaching, carrying, lifting, house/yardwork, driving/computer work. Therapeutic Activities:    [] (35978 or 84885) Provided verbal/tactile cueing for activities related to improving balance, coordination, kinesthetic sense, posture, motor skill, proprioception and motor activation to allow for proper function of cervical, scapular, scapulothoracic and UE control with self care, carrying, lifting, driving/computer work.      Home Exercise Program:    [x] (59331) Reviewed/Progressed HEP activities related to strengthening, flexibility, endurance, ROM of cervical, scapular, scapulothoracic and UE control with self care, reaching, carrying, lifting, house/yardwork, driving/computer work  [] (84377) Reviewed/Progressed HEP activities related to improving balance, coordination, kinesthetic sense, posture, motor skill, proprioception of cervical, scapular, scapulothoracic and UE control with self care, reaching, carrying, lifting, house/yardwork, driving/computer work      Manual Treatments:  PROM / STM / Oscillations-Mobs:  G-I, II, III, IV (PA's, Inf., Post.)  [x] (55912) Provided manual therapy to mobilize soft tissue/joints of cervical/CT, scapular GHJ and UE for the purpose of decreasing headache, modulating pain, promoting relaxation,  increasing ROM, reducing/eliminating soft tissue swelling/inflammation/restriction, improving soft tissue extensibility and allowing for proper ROM for normal function with self care, reaching, carrying, lifting, house/yardwork, driving/computer work    If [facility] Please Indicate Time In/Out  CPT Code Time in Time out                                   Charges:  Timed Code Treatment Minutes: 45   Total Treatment Minutes: 45     [] EVAL (LOW) 37818 (typically 20 minutes face-to-face)  [] EVAL (MOD) 75002 (typically 30 minutes face-to-face)  [] EVAL (HIGH) 36570 (typically 45 minutes face-to-face)  [] RE-EVAL     [] AC(89472) x  1   [] Dry needle 1 or 2 Muscles (96339)  [] NMR (06581) x     [] Dry needle 3+ Muscles (48236)  [x] Manual (42472) x  2  [x] Ultrasound (45512) x 10 min  [] TA (76939) x     [] Mech Traction [phone] 15 min  [] ES(attended) (16241)     [] ES (un) (29497): 15 min  [] Vasopump (15480) [] Ionto (83918)   [] Other:    Bryan Mullen stated goal: \"Build muscle strength and relax muscle tension and range of motion\"  []? Progressing: []? Met: []? Not Met: []? Adjusted     Therapist goals for Patient:   Short Term Goals: To be achieved in: 2 weeks  1. Independent in HEP and progression per patient tolerance, in order to prevent re-injury. []? Progressing: []? Met: []?  Not Met: []? Adjusted  2. Patient will have a decrease in pain to facilitate improvement in movement, function, and ADLs as indicated by Functional Deficits. []? Progressing: []? Met: []? Not Met: []? Adjusted     Long Term Goals: To be achieved in:4 weeks  1. Disability index score of 40% or less for the NDI to assist with reaching prior level of function. []? Progressing: []? Met: []? Not Met: []? Adjusted  2. Patient will demonstrate increased AROM to [facility] of cervical/thoracic spine to allow for proper joint functioning as indicated by patients Functional Deficits. []? Progressing: []? Met: []? Not Met: []? Adjusted  3. Patient will demonstrate an increase in postural awareness and control and activation of  Deep cervical stabilizers to allow for proper functional mobility as indicated by patients Functional Deficits. []? Progressing: []? Met: []? Not Met: []? Adjusted  4. Patient will return to 60%functional activities without increased symptoms or restriction. []? Progressing: []? Met: []? Not Met: []? Adjusted  5. Pt will be able to rotate neck 10 degrees further.(patient specific functional goal)    []? Progressing: []? Met: []? Not Met: []? Adjusted               ASSESSMENT:  See eval    Treatment/Activity Tolerance:  [x] Patient tolerated treatment well [] Patient limited by fatique  [] Patient limited by pain  [] Patient limited by other medical complications  [] Other:     Overall Progression Towards Functional goals/ Treatment Progress Update:  [] Patient is progressing as expected towards functional goals listed. [] Progression is slowed due to complexities/Impairments listed. [] Progression has been slowed due to co-morbidities.   [x] Plan just implemented, too soon to assess goals progression <30days   [] Goals require adjustment due to lack of progress  [] Patient is not progressing as expected and requires additional follow up with physician  [] Other    Prognosis for POC: [x] Good [] Fair  [] Poor    Patient requires continued skilled intervention: [x] Yes  [] No        PLAN:  Follow up with Dr. Holly Jones and insurance about dry needling. Send John Jones suggestions for restrictions regarding RTW. [x] Continue per plan of care [] Alter current plan (see comments)  [] Plan of care initiated [] Hold pending MD visit [] Discharge    Electronically signed by: Kyra Guillen, PT    Note: If patient does not return for scheduled/recommended follow up visits, this note will serve as a discharge from care along with the most recent update on progress.

## 2021-02-22 ENCOUNTER — HOSPITAL ENCOUNTER (OUTPATIENT)
Dept: PHYSICAL THERAPY | Age: 41
Setting detail: THERAPIES SERIES
Discharge: HOME | End: 2021-02-22
Payer: COMMERCIAL

## 2021-02-22 PROCEDURE — 97140 MANUAL THERAPY 1/> REGIONS: CPT

## 2021-02-22 PROCEDURE — 97035 APP MDLTY 1+ULTRASOUND EA 15: CPT

## 2021-02-22 NOTE — FLOWSHEET NOTE
[address] [facility] [place] [place] [address]  Phone: [phone]   Fax:     [phone]    Physical Therapy Treatment Note/ Progress Report:     Date:  2021    Patient Name:  Melissa Cárdenas    :  1980  MRN: [id number]    Pertinent Medical History:      Medical/Treatment Diagnosis Information:  · Diagnosis: M54.81 (ICD-10-CM) - Occipital neuralgia  · Treatment Diagnosis: Pain in neck and headaches, limited mobility of cervical spine , weakness in UE's, tightness of cervical muscles    Insurance/Certification information:  PT Insurance Information: [address] [place] [address] [employer]  Physician Information:  Referring Practitioner: Redmond Severin, MD  Plan of care signed (Y/N): Sent to Dr. 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 She then had lumbar fusion surgery in . Spinal pain continued and worsened and she has had PT, aquatic therapy, chiropractic care, pain management, , and medication. Dr. Deb Gomez attempted to give pt injection on 21 and the muscles were too tight and she could not effectively give the injection. Dr. Deb Gomez referred her to PT for building  muscle strength , and relaxing muscle tissue. Relief is only temporary.     SUBJECTIVE:  See eval  2/3/21: Headaches are less, but spasms in LE's are worse at nights and (93832) Reviewed/Progressed HEP activities related to strengthening, flexibility, endurance, ROM of cervical, scapular, scapulothoracic and UE control with self care, reaching, carrying, lifting, house/yardwork, driving/computer work  [] (74184) Reviewed/Progressed HEP activities related to improving balance, coordination, kinesthetic sense, posture, motor skill, proprioception of cervical, scapular, scapulothoracic and UE control with self care, reaching, carrying, lifting, house/yardwork, driving/computer work      Manual Treatments:  PROM / STM / Oscillations-Mobs:  G-I, II, III, IV (PA's, Inf., Post.)  [x] (54553) Provided manual therapy to mobilize soft tissue/joints of cervical/CT, scapular GHJ and UE for the purpose of decreasing headache, modulating pain, promoting relaxation,  increasing ROM, reducing/eliminating soft tissue swelling/inflammation/restriction, improving soft tissue extensibility and allowing for proper ROM for normal function with self care, reaching, carrying, lifting, house/yardwork, driving/computer work    If [facility] Please Indicate Time In/Out  CPT Code Time in Time out                                   Charges:  Timed Code Treatment Minutes: 45   Total Treatment Minutes: 45     [] EVAL (LOW) 32035 (typically 20 minutes face-to-face)  [] EVAL (MOD) 48805 (typically 30 minutes face-to-face)  [] EVAL (HIGH) 19993 (typically 45 minutes face-to-face)  [] RE-EVAL     [] XR(29222) x  1   [] Dry needle 1 or 2 Muscles (81157)  [] NMR (54244) x     [] Dry needle 3+ Muscles (83281)  [x] Manual (73611) x  2  [x] Ultrasound (02387) x 10 min  [] TA (07934) x     [] Mech Traction (03946) 15 min  [] ES(attended) (12703)     [] ES (un) (12798): 15 min  [] Vasopump (25385) [] Ionto (76701)   [] Other:    [employer] stated goal: \"Build muscle strength and relax muscle tension and range of motion\"  []? Progressing: []? Met: []? Not Met: []? Adjusted     Therapist goals for Patient:   Short Term Goals:  To be achieved in: 2 weeks  1. Independent in HEP and progression per patient tolerance, in order to prevent re-injury. []? Progressing: []? Met: []? Not Met: []? Adjusted  2. Patient will have a decrease in pain to facilitate improvement in movement, function, and ADLs as indicated by Functional Deficits. []? Progressing: []? Met: []? Not Met: []? Adjusted     Long Term Goals: To be achieved in:4 weeks  1. Disability index score of 40% or less for the NDI to assist with reaching prior level of function. []? Progressing: []? Met: []? Not Met: []? Adjusted  2. Patient will demonstrate increased AROM to [facility] of cervical/thoracic spine to allow for proper joint functioning as indicated by patients Functional Deficits. []? Progressing: []? Met: []? Not Met: []? Adjusted  3. Patient will demonstrate an increase in postural awareness and control and activation of  Deep cervical stabilizers to allow for proper functional mobility as indicated by patients Functional Deficits. []? Progressing: []? Met: []? Not Met: []? Adjusted  4. Patient will return to 60%functional activities without increased symptoms or restriction. []? Progressing: []? Met: []? Not Met: []? Adjusted  5. Pt will be able to rotate neck 10 degrees further.(patient specific functional goal)    []? Progressing: []? Met: []? Not Met: []? Adjusted               ASSESSMENT:  See eval    Treatment/Activity Tolerance:  [x] Patient tolerated treatment well [] Patient limited by fatique  [] Patient limited by pain  [] Patient limited by other medical complications  [] Other:     Overall Progression Towards Functional goals/ Treatment Progress Update:  [] Patient is progressing as expected towards functional goals listed. [] Progression is slowed due to complexities/Impairments listed. [] Progression has been slowed due to co-morbidities.   [x] Plan just implemented, too soon to assess goals progression <30days   [] Goals require adjustment due to lack of progress  [] Patient is not progressing as expected and requires additional follow up with physician  [] Other    Prognosis for POC: [x] Good [] Fair  [] Poor    Patient requires continued skilled intervention: [x] Yes  [] No        PLAN:  Follow up with Dr. Es Maynard and insurance about dry needling. Send Ronnie Maynard suggestions for restrictions regarding RTW. [x] Continue per plan of care [] Alter current plan (see comments)  [] Plan of care initiated [] Hold pending MD visit [] Discharge    Electronically signed by: Polly Moncada PT    Note: If patient does not return for scheduled/recommended follow up visits, this note will serve as a discharge from care along with the most recent update on progress.

## 2021-02-24 ENCOUNTER — HOSPITAL ENCOUNTER (OUTPATIENT)
Dept: PHYSICAL THERAPY | Age: 41
Setting detail: THERAPIES SERIES
Discharge: HOME | End: 2021-02-24
Payer: COMMERCIAL

## 2021-02-24 PROCEDURE — 97140 MANUAL THERAPY 1/> REGIONS: CPT

## 2021-02-24 PROCEDURE — 97530 THERAPEUTIC ACTIVITIES: CPT

## 2021-02-24 PROCEDURE — 97035 APP MDLTY 1+ULTRASOUND EA 15: CPT

## 2021-02-24 NOTE — FLOWSHEET NOTE
[address] [facility] [address]  Phone: [phone]   Fax:     [phone]    Physical Therapy Treatment Note/ Progress Report:     Date:  2021    Patient Name:  Stevie Del Cid    :  1980  MRN: [id number]    Pertinent Medical History:      Medical/Treatment Diagnosis Information:  · Diagnosis: M54.81 (ICD-10-CM) - Occipital neuralgia  · Treatment Diagnosis: Pain in neck and headaches, limited mobility of cervical spine , weakness in UE's, tightness of cervical muscles    Insurance/Certification information:  PT Insurance Information: [address] [place] [address] [facility] [place]  Physician Information:  Referring Practitioner: Prakash Zavala MD  Plan of care signed (Y/N): Sent to Dr. 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 She then had lumbar fusion surgery in . Spinal pain continued and worsened and she has had PT, aquatic therapy, chiropractic care, pain management, , and medication. Dr. Claudio Cloud attempted to give pt injection on 21 and the muscles were too tight and she could not effectively give the injection. Dr. Claudio Cloud referred her to PT for building  muscle strength , and relaxing muscle tissue. Relief is only temporary.     SUBJECTIVE:  See eval  2/3/21: Headaches are less, but spasms in LE's are worse at nights and weakness in both arms is worse. 02/05/21; Patient reports H/A are not as bad,sore on left side of the neck,spasms and weakness in her arms are about the same. 2/8/21: Pt reports that she was ok through most of the day on Saturday and around 4:00 PM she had increase pain on left side of head, headaches and arm pain and soreness too. She took a muscle relaxer last night, and she slept well last night but when she took Gabapentin and muscle relaxer on Saturday it made no difference. She lifted a few things between 12:00 and 3:00 on Saturday. (This may be the activity that caused increase of pain.)  2/11/21: Pt reports that she has tried heat, TENS unit and ice, rest and taking Neurontin. She started the  Prednisone yesterday afternoon. She has been worse, but is a little better today. 02/15/21: Patient reports she has been feeling a little better since she started taking Prednisone. States her H/A and tingling down her arms has been about the same. 2/19/21: Pt reports that her neck  and lumbar pain switch back between 4-5/10. She has completed predinsone and it is not too bad.  2/22/21: \"I had a really good weekend and pain decreased to 3/10, but I woke up sore this morning. I have been a lot more tired lately. I have slept better. \"  2/24/21: Pt reports that she has nerve pain in both arms with burning and tingling. Her \"neck is not that bad, she states\". She spent time in front of computer yesterday, but was not very physical. The UE still fatigue very  easlily.   OBJECTIVE: See eval   Observation:    Test measurements:      RESTRICTIONS/PRECAUTIONS:   Exercises/Interventions:   Therapeutic Ex (98358)  Min: Resistance/Repetitions Notes   Dorsal glidesSupine 5 sec x 10    5 sec x 8    15X on each    3 x 30 sec To help with LE spasms   50X To help with LE spasms   OH pulley 3 min 2/24/21             Manual Intervention (33911)  Min:     Cerv mobs/manip 20 min including MET for rotation    Thoracic mobs/manip Prone for thoracic trigger points release 5 min    CT manip     Rib mobilizations     STM 5 min         NMR re-education (83456)  Min:     Theracane         Therapeutic Activity (86179)  Min: 10     Discussed posture and gave brochure on spinal care and safety of wrokstation          Modalities  Min:      [id number] min at 1.5 W/ccm2 while prone            Other Therapeutic Activities:  Pt was educated on PT POC, Diagnosis, Prognosis, pathomechanics as well as frequency and duration of scheduling future physical therapy appointments. Time was also taken on this day to answer all patient questions and participation in PT. Reviewed appointment policy in detail with patient and patient verbalized understanding. 2/8/21: Pt felt well with theracane trial and PT wrote the name for her to look into for possible purchase, if pt so desires. 2/11/21: Kimberlee Olea, PT spoke with pt and educated her about dry needling as a possible treatment. This therapist called Dr. Emili Poon about this treatment and asked registrar to inquire about insurance coverage. 2/19/21: Gave pt further information to consider regarding dry needling treatment. Home Exercise Program: Deepika Sanchez access code [id number] given today. Therapeutic Exercise and NMR EXR  [x] (76628) Provided verbal/tactile cueing for activities related to strengthening, flexibility, endurance, ROM  for improvements in cervical, postural, scapular, scapulothoracic and UE control with self care, reaching, carrying, lifting, house/yardwork, driving/computer work.    [] (52627) Provided verbal/tactile cueing for activities related to improving balance, coordination, kinesthetic sense, posture, motor skill, proprioception  to assist with cervical, scapular, scapulothoracic and UE control with self care, reaching, carrying, lifting, house/yardwork, driving/computer work.     Therapeutic Activities:    [x] (15749 or ) Provided verbal/tactile cueing for activities related to improving balance, coordination, kinesthetic sense, posture, motor skill, proprioception and motor activation to allow for proper function of cervical, scapular, scapulothoracic and UE control with self care, carrying, lifting, driving/computer work.      Home Exercise Program:    [x] (18085) Reviewed/Progressed HEP activities related to strengthening, flexibility, endurance, ROM of cervical, scapular, scapulothoracic and UE control with self care, reaching, carrying, lifting, house/yardwork, driving/computer work  [] (10270) Reviewed/Progressed HEP activities related to improving balance, coordination, kinesthetic sense, posture, motor skill, proprioception of cervical, scapular, scapulothoracic and UE control with self care, reaching, carrying, lifting, house/yardwork, driving/computer work      Manual Treatments:  PROM / STM / Oscillations-Mobs:  G-I, II, III, IV (PA's, Inf., Post.)  [x] (62257) Provided manual therapy to mobilize soft tissue/joints of cervical/CT, scapular GHJ and UE for the purpose of decreasing headache, modulating pain, promoting relaxation,  increasing ROM, reducing/eliminating soft tissue swelling/inflammation/restriction, improving soft tissue extensibility and allowing for proper ROM for normal function with self care, reaching, carrying, lifting, house/yardwork, driving/computer work    If [facility] Please Indicate Time In/Out  CPT Code Time in Time out                                   Charges:  Timed Code Treatment Minutes: 45   Total Treatment Minutes: 45     [] EVAL (LOW) 81626 (typically 20 minutes face-to-face)  [] EVAL (MOD) 59160 (typically 30 minutes face-to-face)  [] EVAL (HIGH) 98224 (typically 45 minutes face-to-face)  [] RE-EVAL     [] WL(00611) x     [] Dry needle 1 or 2 Muscles (75692)  [] NMR (28720) x     [] Dry needle 3+ Muscles (52733)  [x] Manual (87082) x  1  [x] Ultrasound (91251) x 10 min  [x] TA (03475) x  1   [] Mech Traction (57990) 15 min  [] ES(attended) (51218) [] ES (un) (30245): 15 min  [] Vasopump (78490) [] Ionto (57243)   [] Other:    Lendon Corner stated goal: \"Build muscle strength and relax muscle tension and range of motion\"  []? Progressing: []? Met: []? Not Met: []? Adjusted     Therapist goals for Patient:   Short Term Goals: To be achieved in: 2 weeks  1. Independent in HEP and progression per patient tolerance, in order to prevent re-injury. []? Progressing: []? Met: []? Not Met: []? Adjusted  2. Patient will have a decrease in pain to facilitate improvement in movement, function, and ADLs as indicated by Functional Deficits. []? Progressing: []? Met: []? Not Met: []? Adjusted     Long Term Goals: To be achieved in:4 weeks  1. Disability index score of 40% or less for the NDI to assist with reaching prior level of function. []? Progressing: []? Met: []? Not Met: []? Adjusted  2. Patient will demonstrate increased AROM to [facility] of cervical/thoracic spine to allow for proper joint functioning as indicated by patients Functional Deficits. []? Progressing: []? Met: []? Not Met: []? Adjusted  3. Patient will demonstrate an increase in postural awareness and control and activation of  Deep cervical stabilizers to allow for proper functional mobility as indicated by patients Functional Deficits. []? Progressing: []? Met: []? Not Met: []? Adjusted  4. Patient will return to 60%functional activities without increased symptoms or restriction. []? Progressing: []? Met: []? Not Met: []? Adjusted  5. Pt will be able to rotate neck 10 degrees further.(patient specific functional goal)    []? Progressing: []? Met: []? Not Met: []?  Adjusted               ASSESSMENT:  See eval    Treatment/Activity Tolerance:  [x] Patient tolerated treatment well [] Patient limited by fatique  [] Patient limited by pain  [] Patient limited by other medical complications  [] Other:     Overall Progression Towards Functional goals/ Treatment Progress Update:  [] Patient is progressing as expected towards functional goals listed. [] Progression is slowed due to complexities/Impairments listed. [] Progression has been slowed due to co-morbidities. [x] Plan just implemented, too soon to assess goals progression <30days   [] Goals require adjustment due to lack of progress  [] Patient is not progressing as expected and requires additional follow up with physician  [] Other    Prognosis for POC: [x] Good [] Fair  [] Poor    Patient requires continued skilled intervention: [x] Yes  [] No        PLAN:  Follow up with Dr. Ronald Hayden and insurance about dry needling. Send Cathi Hayden suggestions for restrictions regarding RTW. Pt will have two sessions of dry needling the next two visits. [x] Continue per plan of care [] Alter current plan (see comments)  [] Plan of care initiated [] Hold pending MD visit [] Discharge    Electronically signed by: Vanessa Mahoney, PT    Note: If patient does not return for scheduled/recommended follow up visits, this note will serve as a discharge from care along with the most recent update on progress.

## 2021-02-26 ENCOUNTER — APPOINTMENT (OUTPATIENT)
Dept: PHYSICAL THERAPY | Age: 41
End: 2021-02-26
Payer: COMMERCIAL

## 2021-03-01 ENCOUNTER — HOSPITAL ENCOUNTER (OUTPATIENT)
Dept: PHYSICAL THERAPY | Age: 41
Setting detail: THERAPIES SERIES
Discharge: HOME | End: 2021-03-01
Payer: COMMERCIAL

## 2021-03-01 PROCEDURE — 97140 MANUAL THERAPY 1/> REGIONS: CPT

## 2021-03-01 PROCEDURE — G0283 ELEC STIM OTHER THAN WOUND: HCPCS

## 2021-03-01 PROCEDURE — 20561 NDL INSJ W/O NJX 3+ MUSC: CPT

## 2021-03-01 NOTE — FLOWSHEET NOTE
[address] [facility] [place]. [address] [place] [address]  Phone: [phone]   Fax:     [phone]    Physical Therapy Treatment Note/ Progress Report:     Date:  3/1/2021    Patient Name:  Mandy Simons    :  1980  MRN: [id number]    Pertinent Medical History:      Medical/Treatment Diagnosis Information:  · Diagnosis: M54.81 (ICD-10-CM) - Occipital neuralgia  · Treatment Diagnosis: Pain in neck and headaches, limited mobility of cervical spine , weakness in UE's, tightness of cervical muscles    Insurance/Certification information:  PT Insurance Information: [place]  Physician Information:  Referring Practitioner: Aishwarya Winn MD  Plan of care signed (Y/N): Sent to Dr. 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 She then had lumbar fusion surgery in . Spinal pain continued and worsened and she has had PT, aquatic therapy, chiropractic care, pain management, , and medication. Dr. Armond Colorado attempted to give pt injection on 21 and the muscles were too tight and she could not effectively give the injection. Dr. Armond Colorado referred her to PT for building  muscle strength , and relaxing muscle tissue. Relief is only temporary.     SUBJECTIVE:  See eval  2/3/21: Headaches are less, but spasms in LE's are worse at nights and weakness in both arms is worse. 02/05/21; Patient reports H/A are not as bad,sore on left side of the neck,spasms and weakness in her arms are about the same. 2/8/21: Pt reports that she was ok through most of the day on Saturday and around 4:00 PM she had increase pain on left side of head, headaches and arm pain and soreness too. She took a muscle relaxer last night, and she slept well last night but when she took Gabapentin and muscle relaxer on Saturday it made no difference. She lifted a few things between 12:00 and 3:00 on Saturday. (This may be the activity that caused increase of pain.)  2/11/21: Pt reports that she has tried heat, TENS unit and ice, rest and taking Neurontin. She started the  Prednisone yesterday afternoon. She has been worse, but is a little better today. 02/15/21: Patient reports she has been feeling a little better since she started taking Prednisone. States her H/A and tingling down her arms has been about the same. 2/19/21: Pt reports that her neck  and lumbar pain switch back between 4-5/10. She has completed predinsone and it is not too bad.  2/22/21: \"I had a really good weekend and pain decreased to 3/10, but I woke up sore this morning. I have been a lot more tired lately. I have slept better. \"  2/24/21: Pt reports that she has nerve pain in both arms with burning and tingling. Her \"neck is not that bad, she states\". She spent time in front of computer yesterday, but was not very physical. The UE still fatigue very  Easlily. 3/1/21 Pt reports having had bad headaches Saturday but not as bad today.    OBJECTIVE: See eval   Observation:    Test measurements:      RESTRICTIONS/PRECAUTIONS:   Exercises/Interventions:   Therapeutic Ex (03224)  Min: Resistance/Repetitions Notes   Dorsal glides         To help with LE spasms   To help with LE spasms   OH pulley 2/24/21   Seated chin tucks  10 x Post DN   Scapula elevation and retraction  10 x ea  Post DN   Manual Intervention (18023)  Min:     Cerv mobs/manip    Thoracic mobs/manip    CT manip     Rib mobilizations     STM bilat dorsal cerv. pvm's 10 min         NMR re-education (93002)  Min:     Theracane         Therapeutic Activity (41069)  Min: 10     Discussed posture and gave brochure on spinal care and safety of wrokstation          Modalities  Min:      Ultrasound   E stim used with DN to left and right Semispinalis capitus and cerv. Multifidus 10 min              Dry needling manual therapy: consisted on the placement of 6 needles in the following muscles:  Splenius capitus L/R, Semispinalis capitus and cervical multifidus C6-7 L/R  . A 40 and 60  mm needle was inserted, piston, rotated, and coned to produce intramuscular mobilization. These techniques were used to restore functional range of motion, reduce muscle spasm and induce healing in the corresponding musculature. (84401)  Clean Technique was utilized today while applying Dry needling treatment. The treatment sites where cleaned with 70% solution of  isopropyl alcohol . The PT washed their hands and utilized treatment gloves along with hand  prior to inserting the needles. All needles where removed and discarded in the appropriate sharps container. MD has given verbal and/or written approval for this treatment. Attended low frequency (2-3Hz) electrical stimulation was utilized in conjunction with Dry Needling:  the Estim was manipulated between all above needles for a period of 10 min. at 9 volts. The low frequency electrical stimulation was used to help reduce muscle spasm and help to interrupt /Menan the pain cycle. (24950)   Other Therapeutic Activities:  Pt was educated on PT POC, Diagnosis, Prognosis, pathomechanics as well as frequency and duration of scheduling future physical therapy appointments. Time was also taken on this day to answer all patient questions and participation in PT.  Reviewed appointment policy in detail with patient and patient verbalized understanding. 2/8/21: Pt felt well with theracane trial and PT wrote the name for her to look into for possible purchase, if pt so desires. 2/11/21: Bala Zambrano, PT spoke with pt and educated her about dry needling as a possible treatment. This therapist called Dr. Claudio Cloud about this treatment and asked registrar to inquire about insurance coverage. 2/19/21: Gave pt further information to consider regarding dry needling treatment. Home Exercise Program: [address] access code [email or web address] given today. Therapeutic Exercise and NMR EXR  [x] (15349) Provided verbal/tactile cueing for activities related to strengthening, flexibility, endurance, ROM  for improvements in cervical, postural, scapular, scapulothoracic and UE control with self care, reaching, carrying, lifting, house/yardwork, driving/computer work.    [] (17033) Provided verbal/tactile cueing for activities related to improving balance, coordination, kinesthetic sense, posture, motor skill, proprioception  to assist with cervical, scapular, scapulothoracic and UE control with self care, reaching, carrying, lifting, house/yardwork, driving/computer work. Therapeutic Activities:    [x] (39636 or 48775) Provided verbal/tactile cueing for activities related to improving balance, coordination, kinesthetic sense, posture, motor skill, proprioception and motor activation to allow for proper function of cervical, scapular, scapulothoracic and UE control with self care, carrying, lifting, driving/computer work.      Home Exercise Program:    [x] (97768) Reviewed/Progressed HEP activities related to strengthening, flexibility, endurance, ROM of cervical, scapular, scapulothoracic and UE control with self care, reaching, carrying, lifting, house/yardwork, driving/computer work  [] (92369) Reviewed/Progressed HEP activities related to improving balance, coordination, kinesthetic sense, posture, motor skill, proprioception of cervical, scapular, scapulothoracic and UE control with self care, reaching, carrying, lifting, house/yardwork, driving/computer work      Manual Treatments:  PROM / STM / Oscillations-Mobs:  G-I, II, III, IV (PA's, Inf., Post.)  [x] (95223) Provided manual therapy to mobilize soft tissue/joints of cervical/CT, scapular GHJ and UE for the purpose of decreasing headache, modulating pain, promoting relaxation,  increasing ROM, reducing/eliminating soft tissue swelling/inflammation/restriction, improving soft tissue extensibility and allowing for proper ROM for normal function with self care, reaching, carrying, lifting, house/yardwork, driving/computer work    If [facility] Please Indicate Time In/Out  CPT Code Time in Time out                                   Charges:  Timed Code Treatment Minutes: 45   Total Treatment Minutes: 45     [] EVAL (LOW) 84019 (typically 20 minutes face-to-face)  [] EVAL (MOD) 51493 (typically 30 minutes face-to-face)  [] EVAL (HIGH) 83647 (typically 45 minutes face-to-face)  [] RE-EVAL     [] FC(15888) x     [] Dry needle 1 or 2 Muscles (84129)  [] NMR (31927) x     [x] Dry needle 3+ Muscles (06526)  [x] Manual (85945) x  1  [x] Ultrasound (70250) x 10 min  [] TA (67499) x  1   [] Mech Traction (68073) 15 min  [] ES(attended) (94001)     [x] ES (un) (76745): 15 min  [] Vasopump (16426) [] Ionto (48619)   [] Other:    Gaviota Valera stated goal: \"Build muscle strength and relax muscle tension and range of motion\"  []? Progressing: []? Met: []? Not Met: []? Adjusted     Therapist goals for Patient:   Short Term Goals: To be achieved in: 2 weeks  1. Independent in HEP and progression per patient tolerance, in order to prevent re-injury. []? Progressing: []? Met: []? Not Met: []? Adjusted  2. Patient will have a decrease in pain to facilitate improvement in movement, function, and ADLs as indicated by Functional Deficits. []? Progressing: []? Met: []? Not Met: []? Adjusted     Long Term Goals:  To be achieved in:4 will have two sessions of dry needling the next two visits. [x] Continue per plan of care [] Alter current plan (see comments)  [] Plan of care initiated [] Hold pending MD visit [] Discharge    Electronically signed by: Syble Crigler, PT    Note: If patient does not return for scheduled/recommended follow up visits, this note will serve as a discharge from care along with the most recent update on progress.

## 2021-03-03 ENCOUNTER — HOSPITAL ENCOUNTER (OUTPATIENT)
Dept: PHYSICAL THERAPY | Age: 41
Setting detail: THERAPIES SERIES
Discharge: HOME | End: 2021-03-03
Payer: COMMERCIAL

## 2021-03-03 PROCEDURE — G0283 ELEC STIM OTHER THAN WOUND: HCPCS

## 2021-03-03 PROCEDURE — 97140 MANUAL THERAPY 1/> REGIONS: CPT

## 2021-03-03 PROCEDURE — 20561 NDL INSJ W/O NJX 3+ MUSC: CPT

## 2021-03-03 NOTE — FLOWSHEET NOTE
[address] [facility] [address] [place] [address]  Phone: [phone]   Fax:     [phone]    Physical Therapy Treatment Note/ Progress Report:     Date:  3/3/2021    Patient Name:  Marco Rockwell    :  1980  MRN: [id number]    Pertinent Medical History:      Medical/Treatment Diagnosis Information:  · Diagnosis: M54.81 (ICD-10-CM) - Occipital neuralgia  · Treatment Diagnosis: Pain in neck and headaches, limited mobility of cervical spine , weakness in UE's, tightness of cervical muscles    Insurance/Certification information:  PT Insurance Information: [address] [place] [address]  Physician Information:  Referring Practitioner: Chris Novak MD  Plan of care signed (Y/N): Sent to Dr. 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 She then had lumbar fusion surgery in . Spinal pain continued and worsened and she has had PT, aquatic therapy, chiropractic care, pain management, , and medication. Dr. Ila Adam attempted to give pt injection on 21 and the muscles were too tight and she could not effectively give the injection. Dr. Ila Adam referred her to PT for building  muscle strength , and relaxing muscle tissue. Relief is only temporary.     SUBJECTIVE:  See eval  2/3/21: Headaches are less, but spasms in LE's are worse at nights and weakness in both arms is worse. 02/05/21; Patient reports H/A are not as bad,sore on left side of the neck,spasms and weakness in her arms are about the same. 2/8/21: Pt reports that she was ok through most of the day on Saturday and around 4:00 PM she had increase pain on left side of head, headaches and arm pain and soreness too. She took a muscle relaxer last night, and she slept well last night but when she took Gabapentin and muscle relaxer on Saturday it made no difference. She lifted a few things between 12:00 and 3:00 on Saturday. (This may be the activity that caused increase of pain.)  2/11/21: Pt reports that she has tried heat, TENS unit and ice, rest and taking Neurontin. She started the  Prednisone yesterday afternoon. She has been worse, but is a little better today. 02/15/21: Patient reports she has been feeling a little better since she started taking Prednisone. States her H/A and tingling down her arms has been about the same. 2/19/21: Pt reports that her neck  and lumbar pain switch back between 4-5/10. She has completed predinsone and it is not too bad.  2/22/21: \"I had a really good weekend and pain decreased to 3/10, but I woke up sore this morning. I have been a lot more tired lately. I have slept better. \"  2/24/21: Pt reports that she has nerve pain in both arms with burning and tingling. Her \"neck is not that bad, she states\". She spent time in front of computer yesterday, but was not very physical. The UE still fatigue very  Easlily. 3/1/21 Pt reports having had bad headaches Saturday but not as bad today. 3/3/21 Pt reports decreased pain for a few hours post last Rx but did experience some nausea latter that same evening. Does not have nausea at this time. She expressed interest in continuing dry needling today.    OBJECTIVE: See eval   Observation:    Test measurements:      RESTRICTIONS/PRECAUTIONS:   Exercises/Interventions:   Therapeutic Ex (19618)  Min: Resistance/Repetitions Notes   Dorsal glides         To help with LE spasms   To help with LE spasms   OH pulley 2/24/21   Seated cerv. ext 5 x 2     Supine towel roll tucks  15 x 2    Seated chin tucks  10 x 2 Post DN   Scapula elevation and retraction  10 x 3 ea  Post DN   Manual Intervention (78866)  Min:     Cerv mobs/manip OA, AA flexion  Grade 3, cerv. Distraction grade 4  And gross  cerv. Retraction    Thoracic mobs/manip    CT manip     Rib mobilizations     STM bilat dorsal cerv. pvm's 10 min         NMR re-education (96261)  Min:     Theracane         Therapeutic Activity (14650)  Min: 10     Discussed posture and gave brochure on spinal care and safety of wrokstation          Modalities  Min:      Ultrasound   E stim used with DN to left and right Semispinalis capitus and cerv. Multifidus 10 min              Dry needling manual therapy: consisted on the placement of 6 needles in the following muscles:  Splenius capitus L/R, Semispinalis capitus and cervical multifidus C6-7 L/R  . A 40 and 60  mm needle was inserted, piston, rotated, and coned to produce intramuscular mobilization. These techniques were used to restore functional range of motion, reduce muscle spasm and induce healing in the corresponding musculature. (74426)  Clean Technique was utilized today while applying Dry needling treatment. The treatment sites where cleaned with 70% solution of  isopropyl alcohol . The PT washed their hands and utilized treatment gloves along with hand  prior to inserting the needles. All needles where removed and discarded in the appropriate sharps container. MD has given verbal and/or written approval for this treatment. Attended low frequency (2-3Hz) electrical stimulation was utilized in conjunction with Dry Needling:  the Estim was manipulated between all above needles for a period of 10 min. at 9 volts.   The low frequency electrical stimulation was used to help reduce muscle spasm and help to interrupt /[place] the pain cycle. (86460)   Other Therapeutic Activities:  Pt was educated on PT POC, Diagnosis, Prognosis, pathomechanics as well as frequency and duration of scheduling future physical therapy appointments. Time was also taken on this day to answer all patient questions and participation in PT. Reviewed appointment policy in detail with patient and patient verbalized understanding. 2/8/21: Pt felt well with theracane trial and PT wrote the name for her to look into for possible purchase, if pt so desires. 2/11/21: Clair Calero, PT spoke with pt and educated her about dry needling as a possible treatment. This therapist called Dr. Purnima Rinaldi about this treatment and asked registrar to inquire about insurance coverage. 2/19/21: Gave pt further information to consider regarding dry needling treatment. Home Exercise Program: [employer] access code [email or web address] given today. Therapeutic Exercise and NMR EXR  [x] (94700) Provided verbal/tactile cueing for activities related to strengthening, flexibility, endurance, ROM  for improvements in cervical, postural, scapular, scapulothoracic and UE control with self care, reaching, carrying, lifting, house/yardwork, driving/computer work.    [] (03887) Provided verbal/tactile cueing for activities related to improving balance, coordination, kinesthetic sense, posture, motor skill, proprioception  to assist with cervical, scapular, scapulothoracic and UE control with self care, reaching, carrying, lifting, house/yardwork, driving/computer work. Therapeutic Activities:    [x] (36938 or 35671) Provided verbal/tactile cueing for activities related to improving balance, coordination, kinesthetic sense, posture, motor skill, proprioception and motor activation to allow for proper function of cervical, scapular, scapulothoracic and UE control with self care, carrying, lifting, driving/computer work.      Home Exercise Program:    [x] (27076) Reviewed/Progressed HEP activities related to strengthening, flexibility, endurance, ROM of cervical, scapular, scapulothoracic and UE control with self care, reaching, carrying, lifting, house/yardwork, driving/computer work  [] (84646) Reviewed/Progressed HEP activities related to improving balance, coordination, kinesthetic sense, posture, motor skill, proprioception of cervical, scapular, scapulothoracic and UE control with self care, reaching, carrying, lifting, house/yardwork, driving/computer work      Manual Treatments:  PROM / STM / Oscillations-Mobs:  G-I, II, III, IV (PA's, Inf., Post.)  [x] (99853) Provided manual therapy to mobilize soft tissue/joints of cervical/CT, scapular GHJ and UE for the purpose of decreasing headache, modulating pain, promoting relaxation,  increasing ROM, reducing/eliminating soft tissue swelling/inflammation/restriction, improving soft tissue extensibility and allowing for proper ROM for normal function with self care, reaching, carrying, lifting, house/yardwork, driving/computer work      Charges:  Timed Code Treatment Minutes: 50   Total Treatment Minutes: 52     [] EVAL (LOW) 99409 (typically 20 minutes face-to-face)  [] EVAL (MOD) 49456 (typically 30 minutes face-to-face)  [] EVAL (HIGH) 93580 (typically 45 minutes face-to-face)  [] RE-EVAL     [] BA(10117) x     [] Dry needle 1 or 2 Muscles (08900)  [] NMR (09998) x     [x] Dry needle 3+ Muscles (04785)  [x] Manual (00309) x  1  [] Ultrasound (69792) x 10 min  [] TA (03342) x  1   [] Mech Traction (95985) 15 min  [] ES(attended) (72366)     [x] ES (un) (66769): 15 min  [] Vasopump (10205) [] Ionto (09584)   [] Other:    Jazmyne Kingdom stated goal: \"Build muscle strength and relax muscle tension and range of motion\"  []? Progressing: []? Met: []? Not Met: []? Adjusted     Therapist goals for Patient:   Short Term Goals: To be achieved in: 2 weeks  1. Independent in HEP and progression per patient tolerance, in order to prevent re-injury.    []? Progressing: []? Met: []? Not Met: []? Adjusted  2. Patient will have a decrease in pain to facilitate improvement in movement, function, and ADLs as indicated by Functional Deficits. []? Progressing: []? Met: []? Not Met: []? Adjusted     Long Term Goals: To be achieved in:4 weeks  1. Disability index score of 40% or less for the NDI to assist with reaching prior level of function. []? Progressing: []? Met: []? Not Met: []? Adjusted  2. Patient will demonstrate increased AROM to [facility] of cervical/thoracic spine to allow for proper joint functioning as indicated by patients Functional Deficits. []? Progressing: []? Met: []? Not Met: []? Adjusted  3. Patient will demonstrate an increase in postural awareness and control and activation of  Deep cervical stabilizers to allow for proper functional mobility as indicated by patients Functional Deficits. []? Progressing: []? Met: []? Not Met: []? Adjusted  4. Patient will return to 60%functional activities without increased symptoms or restriction. []? Progressing: []? Met: []? Not Met: []? Adjusted  5. Pt will be able to rotate neck 10 degrees further.(patient specific functional goal)    []? Progressing: []? Met: []? Not Met: []? Adjusted               ASSESSMENT:  See eval    Treatment/Activity Tolerance:  [x] Patient tolerated treatment well [] Patient limited by fatique  [] Patient limited by pain  [] Patient limited by other medical complications  [] Other:     Overall Progression Towards Functional goals/ Treatment Progress Update:  [] Patient is progressing as expected towards functional goals listed. [] Progression is slowed due to complexities/Impairments listed. [] Progression has been slowed due to co-morbidities.   [x] Plan just implemented, too soon to assess goals progression <30days   [] Goals require adjustment due to lack of progress  [] Patient is not progressing as expected and requires additional follow up with physician  [] Other    Prognosis for POC: [x] Good [] Fair  [] Poor    Patient requires continued skilled intervention: [x] Yes  [] No        PLAN:   Reassess effectiveness of DN    Follow up with Dr. Suad Green and insurance about dry needling. Send Seb Green suggestions for restrictions regarding RTW. Pt will have two sessions of dry needling the next two visits. [x] Continue per plan of care [] Alter current plan (see comments)  [] Plan of care initiated [] Hold pending MD visit [] Discharge    Electronically signed by: Talha Austin, PT    Note: If patient does not return for scheduled/recommended follow up visits, this note will serve as a discharge from care along with the most recent update on progress.

## 2021-03-05 ENCOUNTER — HOSPITAL ENCOUNTER (OUTPATIENT)
Dept: PHYSICAL THERAPY | Age: 41
Setting detail: THERAPIES SERIES
Discharge: HOME | End: 2021-03-05
Payer: COMMERCIAL

## 2021-03-05 ENCOUNTER — PATIENT MESSAGE (OUTPATIENT)
Dept: FAMILY MEDICINE CLINIC | Age: 41
End: 2021-03-05

## 2021-03-05 DIAGNOSIS — Z72.4 EATING PROBLEM: ICD-10-CM

## 2021-03-05 DIAGNOSIS — M54.2 CHRONIC NECK PAIN: ICD-10-CM

## 2021-03-05 DIAGNOSIS — G89.29 CHRONIC JOINT PAIN: Primary | ICD-10-CM

## 2021-03-05 DIAGNOSIS — M48.02 FORAMINAL STENOSIS OF CERVICAL REGION: ICD-10-CM

## 2021-03-05 DIAGNOSIS — G89.29 CHRONIC NECK PAIN: ICD-10-CM

## 2021-03-05 DIAGNOSIS — M25.50 CHRONIC JOINT PAIN: Primary | ICD-10-CM

## 2021-03-05 PROCEDURE — 97140 MANUAL THERAPY 1/> REGIONS: CPT

## 2021-03-05 PROCEDURE — 97530 THERAPEUTIC ACTIVITIES: CPT

## 2021-03-05 PROCEDURE — G0283 ELEC STIM OTHER THAN WOUND: HCPCS

## 2021-03-05 PROCEDURE — 97110 THERAPEUTIC EXERCISES: CPT

## 2021-03-05 RX ORDER — NAPROXEN 500 MG/1
500 TABLET ORAL 2 TIMES DAILY WITH MEALS
Qty: 60 TABLET | Refills: 2 | Status: SHIPPED | OUTPATIENT
Start: 2021-03-05 | End: 2021-06-28 | Stop reason: SDUPTHER

## 2021-03-05 NOTE — TELEPHONE ENCOUNTER
From: Tiffany Augustine  To: Earlene Martinez MD  Sent: 3/5/2021 12:41 PM EST  Subject: Prescription Question    Good afternoon Dr. Carlos Mcelroy,    I'm contacting you again about a prescription refill. As I realized when I attempted to call in my Naproxen earlier. The remaining refill has . Could you please call that into the [facility] on file.      Thank you,    Shasta Gutierrez

## 2021-03-05 NOTE — PROGRESS NOTES
Omar Teixeira MD  Plan of care signed (Y/N): Sent to Dr. 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 She then had lumbar fusion surgery in . Spinal pain continued and worsened and she has had PT, aquatic therapy, chiropractic care, pain management, , and medication. Dr. Omar Teixeira attempted to give pt injection on 21 and the muscles were too tight and she could not effectively give the injection. Dr. Omar Teixeira referred her to PT for building  muscle strength , and relaxing muscle tissue. Relief is only temporary. SUBJECTIVE:    2/3/21: Headaches are less, but spasms in LE's are worse at nights and weakness in both arms is worse. 21; Patient reports H/A are not as bad,sore on left side of the neck,spasms and weakness in her arms are about the same. 21: Pt reports that she was ok through most of the day on Saturday and around 4:00 PM she had increase pain on left side of head, headaches and arm pain and soreness too. She took a muscle relaxer last night, and she slept well last night but when she took Gabapentin and muscle relaxer on Saturday it made no difference. She lifted a few things between 12:00 and 3:00 on Saturday. (This may be the activity that caused increase of pain.)  21: Pt reports that she has tried heat, TENS unit and ice, rest and taking Neurontin.  She started the Prednisone yesterday afternoon. She has been worse, but is a little better today. 02/15/21: Patient reports she has been feeling a little better since she started taking Prednisone. States her H/A and tingling down her arms has been about the same. 2/19/21: Pt reports that her neck  and lumbar pain switch back between 4-5/10. She has completed predinsone and it is not too bad.  2/22/21: \"I had a really good weekend and pain decreased to 3/10, but I woke up sore this morning. I have been a lot more tired lately. I have slept better. \"  2/24/21: Pt reports that she has nerve pain in both arms with burning and tingling. Her \"neck is not that bad, she states\". She spent time in front of computer yesterday, but was not very physical. The UE still fatigue very  Easlily. 3/1/21 Pt reports having had bad headaches Saturday but not as bad today. 3/3/21 Pt reports decreased pain for a few hours post last Rx but did experience some nausea latter that same evening. Does not have nausea at this time. She expressed interest in continuing dry needling today. 3/5/21: Pt reports that the Wednesday session of dry needling was helpful and felt longer relief and had a good day yesterday. She was active yesterday too. Today, pain is worse again. OBJECTIVE: 3/5/21   Observation:    Test measurements:  Cervical flexion: 9 with pain  Extension: 3 with  pain   SB L:  21  SB R:  18  ROT L:  30   ROT R:  32                                        UE ROM:  Flexion:   130 bilaterally     Abd:  Left 120   R:  150   Int rot:   60 bilaterally                                        Strength: All major muscle groups of UE test as 3/5 with some tremoring upon resistance. Sensation:  Same only on both forearms. Oterwise, there are differences in sensation on all other dermatomes.                                          Palpation:  Still very tender and significantly tight    RESTRICTIONS/PRECAUTIONS:   Exercises/Interventions:   Therapeutic Ex (10046)  Min: Resistance/Repetitions Notes   Dorsal glides         To help with LE spasms   To help with LE spasms   OH pulley 2/24/21   Seated cerv. ext 5 x 2     Supine towel roll tucks  15 x 2    Seated chin tucks  10 x 2 Post DN   Bicep curls 1#  x10    Shoulder flexion 1# x 10    Scaption 1# - unable due to pain    Scapula elevation and retraction  10 x 3 ea  Post DN   Manual Intervention (52984)  Min:     Cerv mobs/manip , cerv. Distraction grade 4  And gross  cerv. Retraction , MET for cervical rotation   Thoracic mobs/manip    CT manip     Rib mobilizations     STM bilat dorsal cerv. pvm's 10 min         NMR re-education (65117)  Min:     Theracane         Therapeutic Activity (30419)  Min: 10     Discussed posture and gave brochure on spinal care and safety of wrokstation          Modalities  Min:      Ultrasound   E stim    15 min to upper cervical area and on upper traps           Dry needling manual therapy: consisted on the placement of 6 needles in the following muscles:  Splenius capitus L/R, Semispinalis capitus and cervical multifidus C6-7 L/R  . A 40 and 60  mm needle was inserted, piston, rotated, and coned to produce intramuscular mobilization. These techniques were used to restore functional range of motion, reduce muscle spasm and induce healing in the corresponding musculature. (31989)  Clean Technique was utilized today while applying Dry needling treatment. The treatment sites where cleaned with 70% solution of  isopropyl alcohol . The PT washed their hands and utilized treatment gloves along with hand  prior to inserting the needles. All needles where removed and discarded in the appropriate sharps container. MD has given verbal and/or written approval for this treatment.      Attended low frequency (2-3Hz) electrical stimulation was utilized in conjunction with Dry Needling:  the Estim was manipulated between all above needles for a period of 10 min. at 9 volts. The low frequency electrical stimulation was used to help reduce muscle spasm and help to interrupt /[place] the pain cycle. (54344)     Other Therapeutic Activities:  Pt was educated on PT POC, Diagnosis, Prognosis, pathomechanics as well as frequency and duration of scheduling future physical therapy appointments. Time was also taken on this day to answer all patient questions and participation in PT. Reviewed appointment policy in detail with patient and patient verbalized understanding. 2/8/21: Pt felt well with theracane trial and PT wrote the name for her to look into for possible purchase, if pt so desires. 2/11/21: Kimberlee Olea, PT spoke with pt and educated her about dry needling as a possible treatment. This therapist called Dr. Emili Poon about this treatment and asked registrar to inquire about insurance coverage. 2/19/21: Gave pt further information to consider regarding dry needling treatment. 3/5/21: Pt and PT reviewed NDI and goals and discussed her current condition and changes with PT at length. Reviewed proper body mechanics principles and proper posture. Gave brochures on posture and body mechanics. Home Exercise Program: [employer] access code [id number] given today. Therapeutic Exercise and NMR EXR  [x] (83126) Provided verbal/tactile cueing for activities related to strengthening, flexibility, endurance, ROM  for improvements in cervical, postural, scapular, scapulothoracic and UE control with self care, reaching, carrying, lifting, house/yardwork, driving/computer work.    [] (66631) Provided verbal/tactile cueing for activities related to improving balance, coordination, kinesthetic sense, posture, motor skill, proprioception  to assist with cervical, scapular, scapulothoracic and UE control with self care, reaching, carrying, lifting, house/yardwork, driving/computer work.     Therapeutic Activities:    [x] (07388 or 45832) Provided verbal/tactile cueing for activities related to improving balance, coordination, kinesthetic sense, posture, motor skill, proprioception and motor activation to allow for proper function of cervical, scapular, scapulothoracic and UE control with self care, carrying, lifting, driving/computer work.      Home Exercise Program:    [x] (84690) Reviewed/Progressed HEP activities related to strengthening, flexibility, endurance, ROM of cervical, scapular, scapulothoracic and UE control with self care, reaching, carrying, lifting, house/yardwork, driving/computer work  [] (47557) Reviewed/Progressed HEP activities related to improving balance, coordination, kinesthetic sense, posture, motor skill, proprioception of cervical, scapular, scapulothoracic and UE control with self care, reaching, carrying, lifting, house/yardwork, driving/computer work      Manual Treatments:  PROM / STM / Oscillations-Mobs:  G-I, II, III, IV (PA's, Inf., Post.)  [x] (41754) Provided manual therapy to mobilize soft tissue/joints of cervical/CT, scapular GHJ and UE for the purpose of decreasing headache, modulating pain, promoting relaxation,  increasing ROM, reducing/eliminating soft tissue swelling/inflammation/restriction, improving soft tissue extensibility and allowing for proper ROM for normal function with self care, reaching, carrying, lifting, house/yardwork, driving/computer work      Charges:  Timed Code Treatment Minutes: 50   Total Treatment Minutes: 80     [] EVAL (LOW) 06560 (typically 20 minutes face-to-face)  [] EVAL (MOD) 58531 (typically 30 minutes face-to-face)  [] EVAL (HIGH) 49332 (typically 45 minutes face-to-face)  [] RE-EVAL     [x] YL(70921) x 1     [] Dry needle 1 or 2 Muscles (70526)  [] NMR (62300) x     [] Dry needle 3+ Muscles (51454)  [x] Manual (09514) x  1  [] Ultrasound (43826) x 10 min  [x] TA (09532) x 2  [] Mech Traction (71286) 15 min  [] ES(attended) (12754)     [x] ES (un) (45862): 15 significant tightness on cervical spine, but feels as if dry needling was helpful. Treatment/Activity Tolerance:  [x] Patient tolerated treatment well [] Patient limited by fatique  [] Patient limited by pain  [] Patient limited by other medical complications  [] Other:     Overall Progression Towards Functional goals/ Treatment Progress Update:  [] Patient is progressing as expected towards functional goals listed. [] Progression is slowed due to complexities/Impairments listed. [] Progression has been slowed due to co-morbidities. [] Plan just implemented, too soon to assess goals progression <30days   [] Goals require adjustment due to lack of progress  [x] Patient is not progressing as expected and requires additional follow up with physician  [] Other    Prognosis for POC: [x] Good [] Fair  [] Poor    Patient requires continued skilled intervention: [x] Yes - if permitted by insurance plan [] No        PLAN:       [] Continue per plan of care [] Alter current plan (see comments)  [] Plan of care initiated [] Hold pending MD visit [x] Discharge for now until further auth from insurance  Electronically signed by: Kelsi Ramírez PT    Note: If patient does not return for scheduled/recommended follow up visits, this note will serve as a discharge from care along with the most recent update on progress.

## 2021-03-05 NOTE — TELEPHONE ENCOUNTER
From: Allyn Loyd  To: Veronica Lott MD  Sent: 3/5/2021 8:18 AM EST  Subject: Non-Urgent Medical Question    Good morning Dr. Elgin Maloney,     I wanted to reach out about a few things after my visit with Dr. Shayy Martin Tuesday. I'm not sure if she's had the opportunity to reach out herself yet. We had a good visit and discussed a lot. She's onboard with scheduling with a rheumatologist as you suggested. I'm assuming you can make a referral and recommendation to someone inside [facility]. We also talked about some eating/diet changes. To attempt to help with my conditions and symptoms. Things that help with inflammation as that's a big factor. That being said we thought meeting with a dietitian and/or nutritionist could be helpful. Therefore I'm hoping you can assist with that as well. Please let me know if those are both things you can help with. I'll look forward to hearing from you.     Thanks,    Jessie Cadena

## 2021-03-12 ENCOUNTER — PATIENT MESSAGE (OUTPATIENT)
Dept: FAMILY MEDICINE CLINIC | Age: 41
End: 2021-03-12

## 2021-03-15 ENCOUNTER — HOSPITAL ENCOUNTER (OUTPATIENT)
Dept: PHYSICAL THERAPY | Age: 41
Setting detail: THERAPIES SERIES
Discharge: HOME | End: 2021-03-15
Payer: COMMERCIAL

## 2021-03-15 PROCEDURE — 97032 APPL MODALITY 1+ESTIM EA 15: CPT

## 2021-03-15 PROCEDURE — 20561 NDL INSJ W/O NJX 3+ MUSC: CPT

## 2021-03-15 PROCEDURE — 97140 MANUAL THERAPY 1/> REGIONS: CPT

## 2021-03-15 NOTE — PROGRESS NOTES
[address] [facility] [place] [address]  Phone: [phone]   Fax:     [phone]           Physical Therapy Discharge Summary    Dear  Dr. Purnima Rinaldi,    We had the pleasure of treating the following patient for physical therapy services at [address] [place] [address]. A summary of our findings can be found in the discharge summary below. If you have any questions or concerns regarding these findings, please do not hesitate to contact me at the office phone number above. Thank you for the referral.     Physician Signature:________________________________Date:__________________  By signing above (or electronic signature), therapists plan is approved by physician      Overall Response to Treatment:   []Patient is responding well to treatment and improvement is noted with regards  to goals   []Patient should continue to improve in reasonable time if they continue HEP   []Patient has plateaued and is no longer responding to skilled PT intervention    []Patient is getting worse and would benefit from return to referring MD   []Patient unable to adhere to initial POC   [x]Other: Patient is still in moderate pain and has had very limited improvement with PT. She has completed the number of visits allowed by her insurance, but will try to get more visits.     Date range of Visits: 21-3/5/21  Total Visits: 12  Date:  3/15/2021    Patient Name:  José Miguel Nicholson    :  1980  MRN: [id number]    Pertinent Medical History:      Medical/Treatment Diagnosis Information:  · Diagnosis: M54.81 (ICD-10-CM) - Occipital neuralgia  · Treatment Diagnosis: Pain in neck and headaches, limited mobility of cervical spine , weakness in UE's, tightness of cervical muscles    Insurance/Certification information:  PT Insurance Information: [address] [facility] [facility] [address]  Physician Information:  Referring Practitioner: Wayne Mendez Es Maynard MD  Plan of care signed (Y/N): Sent to Dr. 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 She then had lumbar fusion surgery in . Spinal pain continued and worsened and she has had PT, aquatic therapy, chiropractic care, pain management, , and medication. Dr. Es Maynard attempted to give pt injection on 21 and the muscles were too tight and she could not effectively give the injection. Dr. Es Maynard referred her to PT for building  muscle strength , and relaxing muscle tissue. Relief is only temporary. SUBJECTIVE:    2/3/21: Headaches are less, but spasms in LE's are worse at nights and weakness in both arms is worse. 21; Patient reports H/A are not as bad,sore on left side of the neck,spasms and weakness in her arms are about the same. 21: Pt reports that she was ok through most of the day on Saturday and around 4:00 PM she had increase pain on left side of head, headaches and arm pain and soreness too. She took a muscle relaxer last night, and she slept well last night but when she took Gabapentin and muscle relaxer on Saturday it made no difference. She lifted a few things between 12:00 and 3:00 on Saturday. (This may be the activity that caused increase of pain.)  21: Pt reports that she has tried heat, TENS unit and ice, rest and taking Neurontin.  She started the Prednisone yesterday afternoon. She has been worse, but is a little better today. 02/15/21: Patient reports she has been feeling a little better since she started taking Prednisone. States her H/A and tingling down her arms has been about the same. 2/19/21: Pt reports that her neck  and lumbar pain switch back between 4-5/10. She has completed predinsone and it is not too bad.  2/22/21: \"I had a really good weekend and pain decreased to 3/10, but I woke up sore this morning. I have been a lot more tired lately. I have slept better. \"  2/24/21: Pt reports that she has nerve pain in both arms with burning and tingling. Her \"neck is not that bad, she states\". She spent time in front of computer yesterday, but was not very physical. The UE still fatigue very  Easlily. 3/1/21 Pt reports having had bad headaches Saturday but not as bad today. 3/3/21 Pt reports decreased pain for a few hours post last Rx but did experience some nausea latter that same evening. Does not have nausea at this time. She expressed interest in continuing dry needling today. 3/5/21: Pt reports that the Wednesday session of dry needling was helpful and felt longer relief and had a good day yesterday. She was active yesterday too. Today, pain is worse again. 3/15/21 Pt reports some relief from the dry needling. OBJECTIVE: 3/5/21   Observation:    Test measurements:  Cervical flexion: 9 with pain  Extension: 3 with  pain   SB L:  21  SB R:  18  ROT L:  30   ROT R:  32                                        UE ROM:  Flexion:   130 bilaterally     Abd:  Left 120   R:  150   Int rot:   60 bilaterally                                        Strength: All major muscle groups of UE test as 3/5 with some tremoring upon resistance. Sensation:  Same only on both forearms. Oterwise, there are differences in sensation on all other dermatomes.                                          Palpation:  Still very tender and significantly tight    RESTRICTIONS/PRECAUTIONS:   Exercises/Interventions:   Therapeutic Ex (72950)  Min: Resistance/Repetitions Notes   Dorsal glides         To help with LE spasms   To help with LE spasms   OH pulley 2/24/21   Seated cerv. Rotation  15 x 2    Seated cerv. ext 5 x 2     Supine towel roll tucks  15 x 2    Seated chin tucks  15 x 2 Post DN   Bicep curls    Shoulder flexion    Scaption    Scapula elevation and retraction  10 x 3 ea  Post DN   Manual Intervention (20065)  Min: 15 min    Cerv mobs/manip OA, AA flexion  Grade 3, cerv. Distraction grade 4  And gross  cerv. Retraction ,    Thoracic mobs/manip    CT manip     Rib mobilizations     STM bilat dorsal cerv. pvm's 10 min         NMR re-education (88566)  Min:     Theracane         Therapeutic Activity (69258)  Min: 10     Discussed posture and gave brochure on spinal care and safety of wrokstation          Modalities  Min:      Ultrasound   E stim used with DN to left and right Semispinalis capitus and cerv. Multifidus 10 min   15 min to upper cervical area and on upper traps           Dry needling manual therapy: consisted on the placement of 6 needles in the following muscles:  Splenius capitus L/R, Semispinalis capitus and cervical multifidus C6-7 L/R  . A 40 and 60  mm needle was inserted, piston, rotated, and coned to produce intramuscular mobilization. These techniques were used to restore functional range of motion, reduce muscle spasm and induce healing in the corresponding musculature. (26135)  Clean Technique was utilized today while applying Dry needling treatment. The treatment sites where cleaned with 70% solution of  isopropyl alcohol . The PT washed their hands and utilized treatment gloves along with hand  prior to inserting the needles. All needles where removed and discarded in the appropriate sharps container. MD has given verbal and/or written approval for this treatment.      Attended low frequency (2-3Hz) electrical stimulation was utilized in conjunction with Dry Needling:  the Estim was manipulated between all above needles for a period of 10 min. at 9 volts. The low frequency electrical stimulation was used to help reduce muscle spasm and help to interrupt /Ventura the pain cycle. (75926)     Other Therapeutic Activities:  Pt was educated on PT POC, Diagnosis, Prognosis, pathomechanics as well as frequency and duration of scheduling future physical therapy appointments. Time was also taken on this day to answer all patient questions and participation in PT. Reviewed appointment policy in detail with patient and patient verbalized understanding. 2/8/21: Pt felt well with theracane trial and PT wrote the name for her to look into for possible purchase, if pt so desires. 2/11/21: Clair Calero, PT spoke with pt and educated her about dry needling as a possible treatment. This therapist called Dr. Purnima Rinaldi about this treatment and asked registrar to inquire about insurance coverage. 2/19/21: Gave pt further information to consider regarding dry needling treatment. 3/5/21: Pt and PT reviewed NDI and goals and discussed her current condition and changes with PT at length. Reviewed proper body mechanics principles and proper posture. Gave brochures on posture and body mechanics. Home Exercise Program: [address] access code [id number] given today.     Therapeutic Exercise and NMR EXR  [x] (35384) Provided verbal/tactile cueing for activities related to strengthening, flexibility, endurance, ROM  for improvements in cervical, postural, scapular, scapulothoracic and UE control with self care, reaching, carrying, lifting, house/yardwork, driving/computer work.    [] (02567) Provided verbal/tactile cueing for activities related to improving balance, coordination, kinesthetic sense, posture, motor skill, proprioception  to assist with cervical, scapular, scapulothoracic and UE control with self care, reaching, carrying, lifting, house/yardwork, driving/computer work. Therapeutic Activities:    [x] (64882 or 58412) Provided verbal/tactile cueing for activities related to improving balance, coordination, kinesthetic sense, posture, motor skill, proprioception and motor activation to allow for proper function of cervical, scapular, scapulothoracic and UE control with self care, carrying, lifting, driving/computer work.      Home Exercise Program:    [x] (76807) Reviewed/Progressed HEP activities related to strengthening, flexibility, endurance, ROM of cervical, scapular, scapulothoracic and UE control with self care, reaching, carrying, lifting, house/yardwork, driving/computer work  [] (01254) Reviewed/Progressed HEP activities related to improving balance, coordination, kinesthetic sense, posture, motor skill, proprioception of cervical, scapular, scapulothoracic and UE control with self care, reaching, carrying, lifting, house/yardwork, driving/computer work      Manual Treatments:  PROM / STM / Oscillations-Mobs:  G-I, II, III, IV (PA's, Inf., Post.)  [x] (44180) Provided manual therapy to mobilize soft tissue/joints of cervical/CT, scapular GHJ and UE for the purpose of decreasing headache, modulating pain, promoting relaxation,  increasing ROM, reducing/eliminating soft tissue swelling/inflammation/restriction, improving soft tissue extensibility and allowing for proper ROM for normal function with self care, reaching, carrying, lifting, house/yardwork, driving/computer work      Charges:  Timed Code Treatment Minutes: 50   Total Treatment Minutes: 53     [] EVAL (LOW) 60136 (typically 20 minutes face-to-face)  [] EVAL (MOD) 73322 (typically 30 minutes face-to-face)  [] EVAL (HIGH) 25058 (typically 45 minutes face-to-face)  [] RE-EVAL     [] CK(59013) x 1     [] Dry needle 1 or 2 Muscles (77759)  [] NMR (29054) x     [] Dry needle 3+ Muscles (81981)  [x] Manual (95758) x  1  [] Ultrasound (83167) x 10 min  [] TA (05111) x 2  [] [facility] Traction (29199) 15 min  [x] ES(attended) (62112)     [x] ES (un) (20181): 15 min  [] Vasopump (06247) [] Ionto (33339)   [x] Other:Dry Needling    GOALS:Patient stated goal: \"Build muscle strength and relax muscle tension and range of motion\"  []? Progressing: []? Met: [x]? Not Met: []? Adjusted     Therapist goals for Patient:   Short Term Goals: To be achieved in: 2 weeks  1. Independent in HEP and progression per patient tolerance, in order to prevent re-injury. []? Progressing: [x]? Met: []? Not Met: []? Adjusted  2. Patient will have a decrease in pain to facilitate improvement in movement, function, and ADLs as indicated by Functional Deficits. []? Progressing: []? Met: [x]? Not Met: []? Adjusted     Long Term Goals: To be achieved in:4 weeks  1. Disability index score of 40% or less for the NDI to assist with reaching prior level of function. []? Progressing: []? Met: [x]? Not Met: []? Adjusted  2. Patient will demonstrate increased AROM to [facility] of cervical/thoracic spine to allow for proper joint functioning as indicated by patients Functional Deficits. []? Progressing: []? Met: [x]? Not Met: []? Adjusted  3. Patient will demonstrate an increase in postural awareness and control and activation of  Deep cervical stabilizers to allow for proper functional mobility as indicated by patients Functional Deficits. []? Progressing: [x]? Met: []? Not Met: []? Adjusted  4. Patient will return to 60%functional activities without increased symptoms or restriction. []? Progressing: []? Met: [x]? Not Met: []? Adjusted  5. Pt will be able to rotate neck 10 degrees further.(patient specific functional goal)    []? Progressing: []? Met: [x]? Not Met: []? Adjusted               ASSESSMENT:  Patient has pain changes by the day. There has not been a consistent improvement in her condition with PT.  Her cervical spinal ROM is actually less today than on initial eval and strength is about the same, but tremoring was noted upon resistance. Of concern is the paresthesia on both arms. She continues to have significant tightness on cervical spine, but feels as if dry needling was helpful. Treatment/Activity Tolerance:  [x] Patient tolerated treatment well [] Patient limited by fatique  [] Patient limited by pain  [] Patient limited by other medical complications  [] Other:     Overall Progression Towards Functional goals/ Treatment Progress Update:  [] Patient is progressing as expected towards functional goals listed. [] Progression is slowed due to complexities/Impairments listed. [] Progression has been slowed due to co-morbidities. [] Plan just implemented, too soon to assess goals progression <30days   [] Goals require adjustment due to lack of progress  [x] Patient is not progressing as expected and requires additional follow up with physician  [] Other    Prognosis for POC: [x] Good [] Fair  [] Poor    Patient requires continued skilled intervention: [x] Yes - if permitted by insurance plan [] No        PLAN: Pt to follow up with her primary PT next session      [] Continue per plan of care [] Alter current plan (see comments)  [] Plan of care initiated [] Hold pending MD visit [x] Discharge for now until further auth from insurance  Electronically signed by: Joseph Markham PT    Note: If patient does not return for scheduled/recommended follow up visits, this note will serve as a discharge from care along with the most recent update on progress.

## 2021-03-16 ENCOUNTER — PATIENT MESSAGE (OUTPATIENT)
Dept: FAMILY MEDICINE CLINIC | Age: 41
End: 2021-03-16

## 2021-03-16 ENCOUNTER — HOSPITAL ENCOUNTER (EMERGENCY)
Age: 41
Discharge: HOME | End: 2021-03-16
Attending: STUDENT IN AN ORGANIZED HEALTH CARE EDUCATION/TRAINING PROGRAM
Payer: COMMERCIAL

## 2021-03-16 ENCOUNTER — NURSE TRIAGE (OUTPATIENT)
Dept: OTHER | Facility: CLINIC | Age: 41
End: 2021-03-16

## 2021-03-16 VITALS
OXYGEN SATURATION: 98 % | HEIGHT: 67 IN | BODY MASS INDEX: 22.6 KG/M2 | RESPIRATION RATE: 20 BRPM | HEART RATE: 101 BPM | WEIGHT: 143.96 LBS | SYSTOLIC BLOOD PRESSURE: 118 MMHG | TEMPERATURE: 97.2 F | DIASTOLIC BLOOD PRESSURE: 78 MMHG

## 2021-03-16 DIAGNOSIS — G43.801 OCULAR MIGRAINE WITH STATUS MIGRAINOSUS: Primary | ICD-10-CM

## 2021-03-16 LAB
A/G RATIO: 1.8 (ref 1.1–2.2)
ALBUMIN SERPL-MCNC: 4.6 G/DL (ref 3.4–5)
ALP BLD-CCNC: 48 U/L (ref 40–129)
ALT SERPL-CCNC: 11 U/L (ref 10–40)
ANION GAP SERPL CALCULATED.3IONS-SCNC: 9 MMOL/L (ref 3–16)
AST SERPL-CCNC: 15 U/L (ref 15–37)
BASOPHILS ABSOLUTE: 0.1 K/UL (ref 0–0.2)
BASOPHILS RELATIVE PERCENT: 0.9 %
BILIRUB SERPL-MCNC: 0.5 MG/DL (ref 0–1)
BUN BLDV-MCNC: 13 MG/DL (ref 7–20)
CALCIUM SERPL-MCNC: 9.5 MG/DL (ref 8.3–10.6)
CHLORIDE BLD-SCNC: 105 MMOL/L (ref 99–110)
CO2: 26 MMOL/L (ref 21–32)
CREAT SERPL-MCNC: 0.6 MG/DL (ref 0.6–1.1)
EOSINOPHILS ABSOLUTE: 0.1 K/UL (ref 0–0.6)
EOSINOPHILS RELATIVE PERCENT: 1 %
GFR AFRICAN AMERICAN: >60
GFR NON-AFRICAN AMERICAN: >60
GLOBULIN: 2.6 G/DL
GLUCOSE BLD-MCNC: 118 MG/DL (ref 70–99)
HCG QUALITATIVE: NEGATIVE
HCT VFR BLD CALC: 40.3 % (ref 36–48)
HEMOGLOBIN: 14 G/DL (ref 12–16)
LYMPHOCYTES ABSOLUTE: 1.7 K/UL (ref 1–5.1)
LYMPHOCYTES RELATIVE PERCENT: 18.6 %
MCH RBC QN AUTO: 31.1 PG (ref 26–34)
MCHC RBC AUTO-ENTMCNC: 34.8 G/DL (ref 31–36)
MCV RBC AUTO: 89.5 FL (ref 80–100)
MONOCYTES ABSOLUTE: 0.5 K/UL (ref 0–1.3)
MONOCYTES RELATIVE PERCENT: 5.3 %
NEUTROPHILS ABSOLUTE: 6.9 K/UL (ref 1.7–7.7)
NEUTROPHILS RELATIVE PERCENT: 74.2 %
PDW BLD-RTO: 13.5 % (ref 12.4–15.4)
PLATELET # BLD: 288 K/UL (ref 135–450)
PMV BLD AUTO: 7.9 FL (ref 5–10.5)
POTASSIUM REFLEX MAGNESIUM: 3.8 MMOL/L (ref 3.5–5.1)
RBC # BLD: 4.5 M/UL (ref 4–5.2)
SODIUM BLD-SCNC: 140 MMOL/L (ref 136–145)
TOTAL PROTEIN: 7.2 G/DL (ref 6.4–8.2)
TROPONIN: <0.01 NG/ML
WBC # BLD: 9.3 K/UL (ref 4–11)

## 2021-03-16 PROCEDURE — 84484 ASSAY OF TROPONIN QUANT: CPT

## 2021-03-16 PROCEDURE — 2580000003 HC RX 258: Performed by: STUDENT IN AN ORGANIZED HEALTH CARE EDUCATION/TRAINING PROGRAM

## 2021-03-16 PROCEDURE — 99284 EMERGENCY DEPT VISIT MOD MDM: CPT

## 2021-03-16 PROCEDURE — 96374 THER/PROPH/DIAG INJ IV PUSH: CPT

## 2021-03-16 PROCEDURE — 84703 CHORIONIC GONADOTROPIN ASSAY: CPT

## 2021-03-16 PROCEDURE — 93005 ELECTROCARDIOGRAM TRACING: CPT | Performed by: STUDENT IN AN ORGANIZED HEALTH CARE EDUCATION/TRAINING PROGRAM

## 2021-03-16 PROCEDURE — 96375 TX/PRO/DX INJ NEW DRUG ADDON: CPT

## 2021-03-16 PROCEDURE — 80053 COMPREHEN METABOLIC PANEL: CPT

## 2021-03-16 PROCEDURE — 85025 COMPLETE CBC W/AUTO DIFF WBC: CPT

## 2021-03-16 PROCEDURE — 6360000002 HC RX W HCPCS: Performed by: STUDENT IN AN ORGANIZED HEALTH CARE EDUCATION/TRAINING PROGRAM

## 2021-03-16 RX ORDER — PROCHLORPERAZINE EDISYLATE 5 MG/ML
10 INJECTION INTRAMUSCULAR; INTRAVENOUS ONCE
Status: COMPLETED | OUTPATIENT
Start: 2021-03-16 | End: 2021-03-16

## 2021-03-16 RX ORDER — 0.9 % SODIUM CHLORIDE 0.9 %
1000 INTRAVENOUS SOLUTION INTRAVENOUS ONCE
Status: COMPLETED | OUTPATIENT
Start: 2021-03-16 | End: 2021-03-16

## 2021-03-16 RX ORDER — ACETAMINOPHEN 500 MG
1000 TABLET ORAL ONCE
Status: DISCONTINUED | OUTPATIENT
Start: 2021-03-16 | End: 2021-03-16 | Stop reason: HOSPADM

## 2021-03-16 RX ORDER — KETOROLAC TROMETHAMINE 30 MG/ML
15 INJECTION, SOLUTION INTRAMUSCULAR; INTRAVENOUS ONCE
Status: COMPLETED | OUTPATIENT
Start: 2021-03-16 | End: 2021-03-16

## 2021-03-16 RX ADMIN — PROCHLORPERAZINE EDISYLATE 10 MG: 5 INJECTION INTRAMUSCULAR; INTRAVENOUS at 17:34

## 2021-03-16 RX ADMIN — KETOROLAC TROMETHAMINE 15 MG: 30 INJECTION, SOLUTION INTRAMUSCULAR at 17:34

## 2021-03-16 RX ADMIN — SODIUM CHLORIDE 1000 ML: 9 INJECTION, SOLUTION INTRAVENOUS at 17:33

## 2021-03-16 ASSESSMENT — PAIN SCALES - GENERAL
PAINLEVEL_OUTOF10: 0
PAINLEVEL_OUTOF10: 5

## 2021-03-16 NOTE — TELEPHONE ENCOUNTER
Reason for Disposition   Loss of vision or double vision    Answer Assessment - Initial Assessment Questions  1. DESCRIPTION: \"Describe your dizziness. \"      Lightheaded, woozy, faint and weak upon standing    2. LIGHTHEADED: \"Do you feel lightheaded? \" (e.g., somewhat faint, woozy, weak upon standing)      See item 1    3. VERTIGO: \"Do you feel like either you or the room is spinning or tilting? \" (i.e. vertigo)      Yes the room does spin and tilt    4. SEVERITY: \"How bad is it? \"  \"Do you feel like you are going to faint? \" \"Can you stand and walk? \"    - MILD - walking normally    - MODERATE - interferes with normal activities (e.g., work, school)     - SEVERE - unable to stand, requires support to walk, feels like passing out now. Feels unstable on her feet but is able to walk normally    5. ONSET:  \"When did the dizziness begin? \"      Onset was about 1 week ago but is worsening    6. AGGRAVATING FACTORS: \"Does anything make it worse? \" (e.g., standing, change in head position)      Head movements make it worse; migraines make it worse    7. HEART RATE: \"Can you tell me your heart rate? \" \"How many beats in 15 seconds? \"  (Note: not all patients can do this)        She has felt her heart racing recently    8. CAUSE: \"What do you think is causing the dizziness? \"      Unknown    9. RECURRENT SYMPTOM: \"Have you had dizziness before? \" If so, ask: \"When was the last time? \" \"What happened that time? \"      No    10. OTHER SYMPTOMS: \"Do you have any other symptoms? \" (e.g., fever, chest pain, vomiting, diarrhea, bleeding)        Dizziness, headache (migraine), history of tinnitits, blurred and sometimes double vision     11. PREGNANCY: \"Is there any chance you are pregnant? \" \"When was your last menstrual period? \"        No    Protocols used: DIZZINESS-ADULT-OH    Call came in from Cole Thomas at the [facility]    See triage above:  Patient reports that for the past week she has been experiencing blurred/double vision, dizziness, headache and tinnitus. She reports that she has been receiving dry needling for neck pain  Patient has a history of migraines and feels that all of her symptoms may worsen when she has a migraine. However her concern today is that her migraine pain is only 3/10 and she has been having blurred vision for the past 8 hours along with dizziness and tinnitus. Reached out to Lucinda Martinez CNP for a second level triage. Ms. Jacquie Bruce recommends that the patient be seen by her PCP today or that she be seen in the ED. Spoke with Debbie at the Service center and there are no appts available today. Recommended the patient go on in to the ED and that she have a . She agrees to do so. Shantanu Ruiz

## 2021-03-16 NOTE — LETTER
[address] [facility] [address] [facility]  Phone: [phone]  Fax: [phone]    Morro Grady MD        March 16, 2021     Patient: Mariano Morrissey   YOB: 1980   Date of Visit: 3/16/2021       To Whom It May Concern: It is my medical opinion that Dave Reuben is unable to perform jury duty at this time. If you have any questions or concerns, please don't hesitate to call.     Sincerely,        Morro Grady MD

## 2021-03-16 NOTE — ED NOTES
Pt allowed to have food and fluids; per EDMD. Pt was given turkey sandwich and a 8oz glass of ice water.       Janene Murphy RN  03/16/21 3891

## 2021-03-16 NOTE — TELEPHONE ENCOUNTER
Ok to schedule Cameron for appt (does not have to be urgent this week)  [id number] [address] for letter [employer]. She has had post-COVID fatigue and headaches requiring specialist intervention that are not resolved. Thanks.

## 2021-03-16 NOTE — TELEPHONE ENCOUNTER
From: Rhonda Mendez  To: Luz Maria Huizar MD  Sent: 3/16/2021 11:53 AM EDT  Subject: Non-Urgent Medical Question    Good day Dr. Amparo Javier,    I'm reaching out about scheduling a visit with you. I'm been experiencing blurred vision, feeling light headed, and dizzy. It's becoming more frequent and worsens with my headaches. I talked to the physical therapist yesterday. When I went for my third dry needling appointment. He wanted me to follow up with you. As well as schedule an eye exam which I'm overdue anyway. I contacted the eye doctor but haven't got an appointment yet. Also I was summoned to report for jury duty. Which I was hoping to get excused from. With all that's on my plate medically alone. I'm here today and in pain trying to manage. While sitting since 8:00 a.m. to see if I'm picked. I was told I could get excused with a note from my doctor. Wondering if you'd be willing to write an excuse.      Thank you,    Nnamdi Flores

## 2021-03-16 NOTE — ED NOTES
Pt A&O to the ED; blurred vision; x 2 weeks. Pt complaining of blurry/ double vision; denies LOC. Pt stated on Saturday her vision started to get worse d/t a migraine. PCP referred the pt ER. Vital signs noted and stable. Patient alert and orient x4. Respirations easy and unlabored. Skin warm and dry and appropriate for ethnicity. No acute distress noted at this time.         Debi Valentino RN  03/16/21 [id number]

## 2021-03-17 LAB
EKG ATRIAL RATE: 81 BPM
EKG DIAGNOSIS: NORMAL
EKG P AXIS: 59 DEGREES
EKG P-R INTERVAL: 136 MS
EKG Q-T INTERVAL: 350 MS
EKG QRS DURATION: 88 MS
EKG QTC CALCULATION (BAZETT): 406 MS
EKG R AXIS: 58 DEGREES
EKG T AXIS: 64 DEGREES
EKG VENTRICULAR RATE: 81 BPM

## 2021-03-17 PROCEDURE — 93010 ELECTROCARDIOGRAM REPORT: CPT | Performed by: INTERNAL MEDICINE

## 2021-03-17 NOTE — ED NOTES
Discharge and education instructions reviewed. Patient verbalized understanding, teach-back successful. Patient denied questions at this time. No acute distress noted. Patient instructed to follow-up as noted - return to emergency department if symptoms worsen. Patient verbalized understanding. Discharged per EDMD with discharged instructions.        Anny Schuster RN  03/16/21 2000

## 2021-03-17 NOTE — ED PROVIDER NOTES
[address] [facility]      Pt Name: Hi Nesbitt  MRN: [id number]  [place] 1980  Date of evaluation: 3/16/2021  Provider: Syed Mena MD    CHIEF COMPLAINT       Chief Complaint   Patient presents with    Blurred Vision     x2 weeks, pt states it got worse this week, states today it has been going on all day    Dizziness         HISTORY OF PRESENT ILLNESS   (Location/Symptom, Timing/Onset,Context/Setting, Quality, Duration, Modifying Factors, Severity)  Note limiting factors. Hi Nesbitt is a 38 y.o. female who presents to the emergency department complaining of blurred vision for the past 2 weeks, this is associated with dizziness that she describes as lightheadedness. She also has associated headache that is also been present for the past 2 weeks, slightly worse yesterday, present today. States that she notices that she is straining more to see, she has not had an updated eyeglasses prescription for the past 2 years. Had a period of diplopia yesterday that was associated with her headache that is now resolved. Not responsive to her medications at home. Similar nature to her prior migraines. Denies focal weakness, vertigo, sensory loss, speech change, loss of vision, loss of peripheral vision. Notes that she feels fatigued a lot of the time. Denies fevers, chills, chest pain, shortness of breath, nausea, vomiting. Symptoms not otherwise alleviated or exacerbated by other factors. NursingNotes were reviewed. REVIEW OF SYSTEMS    (2-9 systems for level 4, 10 or more for level 5)       Constitutional: No fever or chills. Eye: + visual disturbances. No eye pain. Ear/Nose/Mouth/Throat: No nasal congestion. No sore throat. Respiratory: No cough, No shortness of breath, No sputum production. Cardiovascular: No chest pain. No palpitations. Gastrointestinal: No abdominal pain.  No nausea or vomiting  Genitourinary: No dysuria. No hematuria. Hematology/Lymphatics: No bleeding or bruising tendency. Immunologic: No malaise. No swollen glands. Musculoskeletal: No back pain. No joint pain. Integumentary: No rash. No abrasions. Neurologic: + headache. No focal numbness or weakness. PAST MEDICAL HISTORY     Past Medical History:   Diagnosis Date    Bipolar disorder ([address]     Pt was dx age 11    HPV in female    [address] history reviewed with no changes     Ovarian cyst     Ovarian cyst rupture          SURGICALHISTORY       Past Surgical History:   Procedure Laterality Date    BACK SURGERY  06/2013    LUMBAR FUSION    CERVIX SURGERY      biopsy    PAIN MANAGEMENT PROCEDURE N/A 2/18/2020    CERVICAL SIX SEVEN INTERLAMINAR EPIDURAL STEROID INJECTION SITE CONFIRMED BY FLUOROSCOPY performed by Jacqui Klein MD at [address] [place] [address]  2013    L5 S1    TUBAL LIGATION  11/2010         CURRENT MEDICATIONS       Discharge Medication List as of 3/16/2021  7:52 PM      CONTINUE these medications which have NOT CHANGED    Details   naproxen (NAPROSYN) 500 MG tablet Take 1 tablet by mouth 2 times daily (with meals), Disp-60 tablet, R-2Normal      predniSONE (DELTASONE) 10 MG tablet Take 4 tabs by mouth daily for  day, 3 tabs for 2 days, 2 tabs for 2 days, 1 tab for 2 days, Disp-16 tablet, R-0Normal      lactobacillus (CULTURELLE) capsule Take 1 capsule by mouth daily, Disp-30 capsule, R-0Normal      ibuprofen (ADVIL;MOTRIN) 800 MG tablet Take 1 tablet by mouth 3 times daily (with meals), Disp-90 tablet, R-0Normal      gabapentin (NEURONTIN) 100 MG capsule Take 1 capsule by mouth 3 times daily as needed (nerve pain) for up to 60 days.  Intended supply: 90 days, Disp-90 capsule, R-1Normal      omeprazole (PRILOSEC) 20 MG delayed release capsule Take 1 capsule by mouth daily, Disp-30 capsule, R-0Normal      albuterol sulfate HFA (VENTOLIN HFA) 108 (90 Base) MCG/ACT inhaler Inhale 2 puffs into the lungs 4 times daily as needed for Wheezing, Disp-1 Inhaler, R-5Normal      tiZANidine (ZANAFLEX) 2 MG tablet Take 1 tablet by mouth nightly as needed (neck pain), Disp-10 tablet,R-0Normal      docusate sodium (COLACE) 100 MG capsule Take 100 mg by mouth 2 times dailyHistorical Med      senna (SENOKOT) 8.6 MG tablet Take 1 tablet by mouth dailyHistorical Med      nicotine (NICOTROL) 10 MG/ML SOLN nasal spray 1 spray by Nasal route every hour as needed for Smoking cessation, Disp-2 Bottle, R-5Normal      melatonin 3 MG TABS tablet Take 3 mg by mouth dailyHistorical Med      nicotine (NICODERM CQ) 14 MG/24HR Place 1 patch onto the skin every 24 hours, Disp-30 patch, R-2Normal      acetaminophen (APAP EXTRA STRENGTH) 500 MG tablet Take 2 tablets by mouth every 6 hours as needed for Pain DO NOT TAKE WITH OTHER MEDICATIONS CONTAINING ACETAMINOPHEN., Disp-30 tablet, R-0Print             ALLERGIES     Bee venom, Fentanyl, and Penicillins    FAMILY HISTORY       Family History   Problem Relation Age of Onset    Asthma Father     Other Father     Cancer Maternal Aunt         ovarian and cervical     Asthma Maternal Aunt     Hyperthyroidism Maternal Aunt     Cancer Maternal Uncle     Prostate Cancer Paternal Uncle     Breast Cancer Maternal Grandmother     Diabetes Maternal Grandfather     High Blood Pressure Maternal Grandfather     Prostate Cancer Paternal Grandfather           SOCIAL HISTORY       Social History     Socioeconomic History    Marital status:      Spouse name: None    Number of children: None    Years of education: None    Highest education level: None   Occupational History    None   Social Needs    Financial resource strain: Not hard at all   [employer] insecurity     Worry: Never true     Inability: Never true   [employer] needs     Medical: None     Non-medical: None   Tobacco Use    Smoking status: Current Every Day Smoker     Packs/day: 0.50    Smokeless tobacco: Never Used    Tobacco comment: pt smokes on average half a pack to one pack a day    Substance and Sexual Activity    Alcohol use: Not Currently     Comment: very rare    Drug use: Never    Sexual activity: Not Currently   Lifestyle    Physical activity     Days per week: None     Minutes per session: None    Stress: None   Relationships    Social connections     Talks on phone: None     Gets together: None     Attends Hoahaoism service: None     Active member of club or organization: None     Attends meetings of clubs or organizations: None     Relationship status: None    Intimate partner violence     Fear of current or ex partner: None     Emotionally abused: None     Physically abused: None     Forced sexual activity: None   Other Topics Concern    None   Social History Narrative    Recently moved from [facility], [facility] [address] her to be with her father, Christofer Chatman (patient here) to care for him with hospice due to his cancer    - taking the summer off to care for her father, and she will consider starting back up after Hospice    - has 3 children ages 25, 8, and 8       SCREENINGS             PHYSICAL EXAM    (up to 7 for level 4, 8 or more for level 5)     ED Triage Vitals [03/16/21 1635]   BP Temp Temp Source Pulse Resp SpO2 Height Weight   (!) 157/86 97.2 °F (36.2 °C) Temporal 101 18 97 % 5' 7\" (1.702 m) 143 lb 15.4 oz (65.3 kg)       General: Alert and oriented appropriately for age, No acute distress. Eye: Normal conjunctiva. Pupils equal and reactive. Visual acuity 20/30 in the left eye. Visual acuity 20/40 in the right eye. HENT: Oral mucosa is moist.  Neck: Supple, no nuchal rigidity, no midline tenderness to palpation. Able to range without difficulty. Respiratory: Respirations even and non-labored. Lungs clear to auscultation  Cardiovascular: Normal rate, Regular rhythm. Intact peripheral pulses. Gastrointestinal: Soft, Non-tender, Non-distended.   Musculoskeletal: No swelling. Integumentary: Warm, Dry. Neurologic: Alert and appropriate for age. Cranial nerves II through XII intact. No disconjugate gaze. No nystagmus. Strength 5 out of 5 throughout. No drift. Speech clear. No elements of receptive aphasia. Sensation intact throughout. No ataxia, no dysmetria. Steady gait. No focal deficits. Psychiatric: Cooperative. DIAGNOSTIC RESULTS     EKG: All EKG's are interpreted by the Emergency Department Physician who either signs or Co-signsthis chart in the absence of a cardiologist.    The Ekg interpreted by me shows  normal sinus rhythm with a rate of 81 bpm.  Axis is   Normal  QTc is  normal  Intervals and Durations are unremarkable. ST Segments: normal, possible Q wave in lead V3 with poor R wave progression, possible remote anterior infarct. No significant change from prior EKG dated December 11, 2020. LABS:  Labs Reviewed   COMPREHENSIVE METABOLIC PANEL W/ REFLEX TO MG FOR LOW K - Abnormal; Notable for the following components:       Result Value    Glucose 118 (*)     All other components within normal limits    Narrative:     Performed at:  [facility]  [address]   Phone [phone]   HCG, SERUM, QUALITATIVE    Narrative:     Performed at:  [facility]  [address]   Phone [phone]   CBC WITH AUTO DIFFERENTIAL    Narrative:     Performed at:  [facility]  [address]   Phone [phone]   TROPONIN    Narrative:     Performed at:  [facility]  [address]   Phone [phone]       All other labs were within normal range or not returned as of this dictation.     EMERGENCY DEPARTMENT COURSE and DIFFERENTIAL DIAGNOSIS/MDM:   Vitals:    Vitals:    03/16/21 1635 03/16/21 1945   BP: (!) 157/86 118/78   Pulse: 101    Resp: 18 20   Temp: 97.2 °F (36.2 °C)    TempSrc: Temporal    SpO2: 97% 98%   Weight: 143 lb 15.4 oz (65.3 kg)    Height: 5' 7\" (1.702 m)          Medical decision makin-year-old woman with past medical history of spinal surgery, migraine headaches who presents today with headache that has been ongoing for the past 2 weeks, coupled with transient diplopia yesterday, remarks about blurry vision and increased eyestrain, outdated eyeglasses prescription. She has no focal neurologic deficits on exam, NIH stroke scale is 0, no dysmetria, no truncal instability, negative test of skew. Steady ambulation in the emergency department without instability. Doubt that this is a posterior circulation stroke, headache would be atypical.  Likely ocular migraine. Will medicate with medications ordered as below, reassess, lab work-up, EKG given lightheadedness component to risk stratify for unlikely ACS or features that would predispose to syncope. Has no ptosis on exam, no disconjugate gaze to suggest myasthenia gravis, this is unlikely. EKG largely unremarkable, troponin negative, labs otherwise unremarkable. Patient feeling better after medications, visual acuity confirmed to be not 20/20 even with corrective vision, patient has to strain to see the card. No evidence of other ocular pathology on examination. Patient is to follow-up with an optometrist, given referral to the [place] [address], neurology, given discharge instructions and return precautions, patient voices understanding.      Medications   prochlorperazine (COMPAZINE) injection 10 mg (10 mg Intravenous Given 3/16/21 1734)   ketorolac (TORADOL) injection 15 mg (15 mg Intravenous Given 3/16/21 1734)   0.9 % sodium chloride bolus (0 mLs Intravenous Stopped 3/16/21 1951)     I estimate there is LOW risk for (including but not limited to) BACTERIAL MENINGITIS, ISCHEMIC OR HEMORRHAGE CVA (Ex. SAH), VASCULAR DISSECTION, TEMPORAL ARTERITIS, or ACUTE CORONARY SYNDROME thus I consider the discharge disposition reasonable. Milton Ramirez (or their surrogate) and I have discussed the diagnosis and risks, and we agree with discharging home with close follow-up. We also discussed returning to the Emergency Department immediately if new or worsening symptoms occur. We have discussed the symptoms which are most concerning that necessitate immediate return. I estimate there is LOW risk for (including but not limited to) INTRACRANIAL HEMORRHAGE, ISCHEMIC CVA,  MENINGITIS, ACUTE CORONARY SYNDROME, MALIGNANT DYSRHYTHMIA, PULMONARY EMBOLISM, PNEUMONIA or SEPSIS, thus I consider the discharge disposition reasonable. Discharge is especially supported since the patient has improvement of presenting symptoms, demonstrates steady ambulation at their baseline in the emergency department on my reassessment, persists to have no focal neurologic deficits on my reexamination, and demonstrates robust p.o. tolerance. Milton Ramirez (or their surrogate) and I have discussed the diagnosis and risks, and we agree with discharging home with close follow-up. We also discussed returning to the Emergency Department immediately if new or worsening symptoms occur. We have discussed the symptoms which are most concerning that necessitate immediate return. FINAL IMPRESSION      1. Ocular migraine with status migrainosus          DISPOSITION/PLAN   DISPOSITION Decision To Discharge 03/16/2021 07:48:53 PM      PATIENT REFERRED TO:  MD Ross Chase [facility] [address] [place] [address]  [phone]    On 3/19/2021  as currently scheduled    [address] - Neurology  [place] [address]  [phone]  In 1 week  or your neurologist for further evaluation and treatment of your migraine headaches, potential further workup.     [address] [facility] Emergency Department  [facility] [facility] [address]  [phone]    If symptoms worsen    [facility]  [address]  [address] [place] [address],[address] [address] [phone]  In 2 days        DISCHARGE MEDICATIONS:  Discharge Medication List as of 3/16/2021  7:52 PM             (Please note that portions of this note were completed with a voice recognition program.Efforts were made to edit the dictations but occasionally words are mis-transcribed.)    Sonia Garcia MD (electronically signed)  Attending Emergency Physician         Sonia Garcia MD  03/17/21 8175

## 2021-03-19 ENCOUNTER — OFFICE VISIT (OUTPATIENT)
Dept: FAMILY MEDICINE CLINIC | Age: 41
End: 2021-03-19
Payer: COMMERCIAL

## 2021-03-19 VITALS
HEART RATE: 78 BPM | SYSTOLIC BLOOD PRESSURE: 144 MMHG | TEMPERATURE: 98.1 F | BODY MASS INDEX: 22.54 KG/M2 | OXYGEN SATURATION: 96 % | HEIGHT: 67 IN | RESPIRATION RATE: 14 BRPM | DIASTOLIC BLOOD PRESSURE: 94 MMHG | WEIGHT: 143.6 LBS

## 2021-03-19 DIAGNOSIS — H53.9 VISUAL DISTURBANCE: Primary | ICD-10-CM

## 2021-03-19 DIAGNOSIS — G89.29 CHRONIC NONINTRACTABLE HEADACHE, UNSPECIFIED HEADACHE TYPE: ICD-10-CM

## 2021-03-19 DIAGNOSIS — R51.9 CHRONIC NONINTRACTABLE HEADACHE, UNSPECIFIED HEADACHE TYPE: ICD-10-CM

## 2021-03-19 PROCEDURE — G8484 FLU IMMUNIZE NO ADMIN: HCPCS | Performed by: FAMILY MEDICINE

## 2021-03-19 PROCEDURE — 4004F PT TOBACCO SCREEN RCVD TLK: CPT | Performed by: FAMILY MEDICINE

## 2021-03-19 PROCEDURE — 99214 OFFICE O/P EST MOD 30 MIN: CPT | Performed by: FAMILY MEDICINE

## 2021-03-19 PROCEDURE — G8427 DOCREV CUR MEDS BY ELIG CLIN: HCPCS | Performed by: FAMILY MEDICINE

## 2021-03-19 PROCEDURE — G8420 CALC BMI NORM PARAMETERS: HCPCS | Performed by: FAMILY MEDICINE

## 2021-03-19 NOTE — PROGRESS NOTES
3/19/2021    This is a 38 y.o. female   Chief Complaint   Patient presents with    Migraine     pt was in [address] [place] [address],[address] ED on 3/16/21 for ocular migraine and blurred vision. blurred vision has gotten better. but still having problems. HPI   Here for ER f/u    Went to ED for dizziness, blurred vision. Had negative workup in the ED and a reassuring exam and was dx with an ocular migraine. She does suffer from chronic headaches. Her symptoms responded well to compazine and toradol while in the ER.   - since then, her blurred vision has remained intermittent not getting better or worse. The blurred vision occurs the longer she has to strain her eyes to focus on an object. Sometimes will progress to double vision. These episodes will last a couple of moments up to 30 minutes. - she denies any other known focal neuro symptoms    For occipital neuralgia and tension headaches, she has been going to PT and doing dry needling. Seeing Dr. Deb Gomez of [facility] for injections.      Review of Systems     Past Medical History:   Diagnosis Date    Bipolar disorder ([address]     Pt was dx age 14    HPV in female     Medical history reviewed with no changes     Ovarian cyst     Ovarian cyst rupture        Past Surgical History:   Procedure Laterality Date    BACK SURGERY  06/2013    LUMBAR FUSION    CERVIX SURGERY      biopsy    PAIN MANAGEMENT PROCEDURE N/A 2/18/2020    CERVICAL SIX SEVEN INTERLAMINAR EPIDURAL STEROID INJECTION SITE CONFIRMED BY FLUOROSCOPY performed by Joon Greene MD at [facility]  2013    L5 S1    TUBAL LIGATION  11/2010       Family History   Problem Relation Age of Onset    Asthma Father     Other Father     Cancer Maternal Aunt         ovarian and cervical     Asthma Maternal Aunt     Hyperthyroidism Maternal Aunt     Cancer Maternal Uncle     Prostate Cancer Paternal Uncle     Breast Cancer Maternal Grandmother     Diabetes Maternal Grandfather     High Blood Pressure Maternal Grandfather     Prostate Cancer Paternal Grandfather        Current Outpatient Medications   Medication Sig Dispense Refill    verapamil (CALAN SR) 120 MG extended release tablet Take 1 tablet by mouth daily 30 tablet 1    naproxen (NAPROSYN) 500 MG tablet Take 1 tablet by mouth 2 times daily (with meals) 60 tablet 2    ibuprofen (ADVIL;MOTRIN) 800 MG tablet Take 1 tablet by mouth 3 times daily (with meals) 90 tablet 0    omeprazole (PRILOSEC) 20 MG delayed release capsule Take 1 capsule by mouth daily 30 capsule 0    albuterol sulfate HFA (VENTOLIN HFA) 108 (90 Base) MCG/ACT inhaler Inhale 2 puffs into the lungs 4 times daily as needed for Wheezing 1 Inhaler 5    tiZANidine (ZANAFLEX) 2 MG tablet Take 1 tablet by mouth nightly as needed (neck pain) 10 tablet 0    docusate sodium (COLACE) 100 MG capsule Take 100 mg by mouth 2 times daily      senna (SENOKOT) 8.6 MG tablet Take 1 tablet by mouth daily      nicotine (NICOTROL) 10 MG/ML SOLN nasal spray 1 spray by Nasal route every hour as needed for Smoking cessation 2 Bottle 5    melatonin 3 MG TABS tablet Take 3 mg by mouth daily      acetaminophen (APAP EXTRA STRENGTH) 500 MG tablet Take 2 tablets by mouth every 6 hours as needed for Pain DO NOT TAKE WITH OTHER MEDICATIONS CONTAINING ACETAMINOPHEN. 30 tablet 0    gabapentin (NEURONTIN) 100 MG capsule Take 1 capsule by mouth 3 times daily as needed (nerve pain) for up to 60 days. Intended supply: 90 days 90 capsule 1    nicotine (NICODERM CQ) 14 MG/24HR Place 1 patch onto the skin every 24 hours (Patient not taking: Reported on 1/14/2021) 30 patch 2     No current facility-administered medications for this visit. BP (!) 144/94   Pulse 78   Temp 98.1 °F (36.7 °C)   Resp 14   Ht 5' 7\" (1.702 m)   Wt 143 lb 9.6 oz (65.1 kg)   SpO2 96%   BMI 22.49 kg/m²     Physical Exam  Constitutional:       Appearance: She is well-developed. HENT:      Head: Normocephalic and atraumatic. Pulmonary:      Effort: Pulmonary effort is normal.   Skin:     Coloration: Skin is not pale. Neurological:      General: No focal deficit present. Mental Status: She is alert and oriented to person, place, and time. Mental status is at baseline. Cranial Nerves: No cranial nerve deficit. Motor: No weakness. Wt Readings from Last 3 Encounters:   03/19/21 143 lb 9.6 oz (65.1 kg)   03/16/21 143 lb 15.4 oz (65.3 kg)   01/14/21 140 lb (63.5 kg)       BP Readings from Last 3 Encounters:   03/19/21 (!) 144/94   03/16/21 118/78   01/14/21 138/82       Assessment/Plan:  1. Visual disturbance  - External Referral To Neurology  - MRI BRAIN WO CONTRAST; Future    2. Chronic nonintractable headache, unspecified headache type  - External Referral To Neurology  - MRI BRAIN WO CONTRAST; Future  - verapamil (CALAN SR) 120 MG extended release tablet; Take 1 tablet by mouth daily  Dispense: 30 tablet; Refill: 1    Normal neuro exam  Based on symptoms with visual disturbance and eye involvement we will check a brain MRI to evaluate for rare things such as MS. Start verapamil for migraine prophylaxis and she will be seen by neurology. We did discuss connecting some of her symptoms she may fall into the category of fibromyalgia. First her symptoms warrant specialist evaluation to rule out other causes.   She will also schedule an appointment with ophthalmology and this referral was provided

## 2021-03-24 ENCOUNTER — HOSPITAL ENCOUNTER (OUTPATIENT)
Dept: PHYSICAL THERAPY | Age: 41
Setting detail: THERAPIES SERIES
Discharge: HOME | End: 2021-03-24
Payer: COMMERCIAL

## 2021-03-24 PROCEDURE — 97035 APP MDLTY 1+ULTRASOUND EA 15: CPT

## 2021-03-24 PROCEDURE — 97530 THERAPEUTIC ACTIVITIES: CPT

## 2021-03-24 PROCEDURE — 97140 MANUAL THERAPY 1/> REGIONS: CPT

## 2021-03-24 NOTE — FLOWSHEET NOTE
[address] [facility] [place]. [address] [place] [address]  Phone: [phone]   Fax:     [phone]    Physical Therapy Treatment Note/ Progress Report:            Date:  3/24/2021    Patient Name:  Monico Pretty    :  1980  MRN: [id number]    Pertinent Medical History:      Medical/Treatment Diagnosis Information:  · Diagnosis: M54.81 (ICD-10-CM) - Occipital neuralgia  · Treatment Diagnosis: Pain in neck and headaches, limited mobility of cervical spine , weakness in UE's, tightness of cervical muscles    Insurance/Certification information:  PT Insurance Information: [address] [place] [address] [facility]  Physician Information:  Referring Practitioner: Sarahi Soliman MD  Plan of care signed (Y/N): Sent to Dr. 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 She then had lumbar fusion surgery in . Spinal pain continued and worsened and she has had PT, aquatic therapy, chiropractic care, pain management, , and medication. Dr. Wendy Chin attempted to give pt injection on 21 and the muscles were too tight and she could not effectively give the injection. Dr. Wendy Chin referred her to PT for building  muscle strength , and relaxing muscle tissue. Relief is only temporary.     SUBJECTIVE:    2/3/21: Headaches are less, but spasms in LE's are worse at nights and weakness in both arms is worse. 02/05/21; Patient reports H/A are not as bad,sore on left side of the neck,spasms and weakness in her arms are about the same. 2/8/21: Pt reports that she was ok through most of the day on Saturday and around 4:00 PM she had increase pain on left side of head, headaches and arm pain and soreness too. She took a muscle relaxer last night, and she slept well last night but when she took Gabapentin and muscle relaxer on Saturday it made no difference. She lifted a few things between 12:00 and 3:00 on Saturday. (This may be the activity that caused increase of pain.)  2/11/21: Pt reports that she has tried heat, TENS unit and ice, rest and taking Neurontin. She started the  Prednisone yesterday afternoon. She has been worse, but is a little better today. 02/15/21: Patient reports she has been feeling a little better since she started taking Prednisone. States her H/A and tingling down her arms has been about the same. 2/19/21: Pt reports that her neck  and lumbar pain switch back between 4-5/10. She has completed predinsone and it is not too bad.  2/22/21: \"I had a really good weekend and pain decreased to 3/10, but I woke up sore this morning. I have been a lot more tired lately. I have slept better. \"  2/24/21: Pt reports that she has nerve pain in both arms with burning and tingling. Her \"neck is not that bad, she states\". She spent time in front of computer yesterday, but was not very physical. The UE still fatigue very  Easlily. 3/1/21 Pt reports having had bad headaches Saturday but not as bad today. 3/3/21 Pt reports decreased pain for a few hours post last Rx but did experience some nausea latter that same evening. Does not have nausea at this time. She expressed interest in continuing dry needling today.    3/5/21: Pt reports that the Wednesday session of dry needling was helpful and felt longer relief and had a good day yesterday. She was active yesterday too. Today, pain is worse again. 3/15/21 Pt reports some relief from the dry needling.  3/24/21: Pt reports that dry needling is helpful. Since last appt, she has been into ED for double vision and migraine headaches, lightheadedness and dizziness. She has an order for an MRI and will see eye doctor next Tuesday and the following week, she will see neurologist and then the rheumatologist.    OBJECTIVE: 3/5/21   Observation:    Test measurements:  Cervical flexion: 9 with pain  Extension: 3 with  pain   SB L:  21  SB R:  18  ROT L:  30   ROT R:  32                                        UE ROM:  Flexion:   130 bilaterally     Abd:  Left 120   R:  150   Int rot:   60 bilaterally                                        Strength: All major muscle groups of UE test as 3/5 with some tremoring upon resistance. Sensation:  Same only on both forearms. Oterwise, there are differences in sensation on all other dermatomes. Palpation:  Still very tender and significantly tight    RESTRICTIONS/PRECAUTIONS:   Exercises/Interventions:   Therapeutic Ex (25569)  Min: Resistance/Repetitions Notes   Dorsal glides         To help with LE spasms   To help with LE spasms   OH pulley 2/24/21   Seated cerv. Rotation     Seated cerv. ext    Supine towel roll tucks     Seated chin tucks  Post DN   Bicep curls    Shoulder flexion    Scaption    Scapula elevation and retraction  Post DN   Manual Intervention (78560)  Min: 25 15 min    Cerv mobs/manip OA, AA flexion  Grade 3, cerv. Distraction grade 4  And gross  cerv.  Retraction ,    Thoracic mobs/manip Prone for thoracic trigger points release and PA's -10 min    CT manip     Rib mobilizations     STM bilat dorsal cerv. pvm's 10 min         NMR re-education (60016)  Min:     Theracane         Therapeutic Activity (08302)  Min: 10     Discussed posture and gave brochure on spinal care and safety of wrokstation          Modalities  Min:      [id number] min at 1.6 W/ccm2 while prone   E stim    Has this at home           Dry needling manual therapy: consisted on the placement of 6 needles in the following muscles:  Splenius capitus L/R, Semispinalis capitus and cervical multifidus C6-7 L/R  . A 40 and 60  mm needle was inserted, piston, rotated, and coned to produce intramuscular mobilization. These techniques were used to restore functional range of motion, reduce muscle spasm and induce healing in the corresponding musculature. (94002)  Clean Technique was utilized today while applying Dry needling treatment. The treatment sites where cleaned with 70% solution of  isopropyl alcohol . The PT washed their hands and utilized treatment gloves along with hand  prior to inserting the needles. All needles where removed and discarded in the appropriate sharps container. MD has given verbal and/or written approval for this treatment. Attended low frequency (2-3Hz) electrical stimulation was utilized in conjunction with Dry Needling:  the Estim was manipulated between all above needles for a period of 10 min. at 9 volts. The low frequency electrical stimulation was used to help reduce muscle spasm and help to interrupt /Lester the pain cycle. (41152)     Other Therapeutic Activities:  Pt was educated on PT POC, Diagnosis, Prognosis, pathomechanics as well as frequency and duration of scheduling future physical therapy appointments. Time was also taken on this day to answer all patient questions and participation in PT. Reviewed appointment policy in detail with patient and patient verbalized understanding. 2/8/21: Pt felt well with theracane trial and PT wrote the name for her to look into for possible purchase, if pt so desires. 2/11/21: Cheyenne Kirk, PT spoke with pt and educated her about dry needling as a possible treatment.  This therapist called  Cary Knutson about this treatment and asked registrar to inquire about insurance coverage. 2/19/21: Gave pt further information to consider regarding dry needling treatment. 3/5/21: Pt and PT reviewed NDI and goals and discussed her current condition and changes with PT at length. Reviewed proper body mechanics principles and proper posture. Gave brochures on posture and body mechanics. 3/34/21: Lengthy discussion with patient to update therapist on her condition and insurance allowance. Also gave information about and discussed Gentile home cervical traction device. Home Exercise Program: [address] access code [id number] given today. Therapeutic Exercise and NMR EXR  [x] (80301) Provided verbal/tactile cueing for activities related to strengthening, flexibility, endurance, ROM  for improvements in cervical, postural, scapular, scapulothoracic and UE control with self care, reaching, carrying, lifting, house/yardwork, driving/computer work.    [] (67453) Provided verbal/tactile cueing for activities related to improving balance, coordination, kinesthetic sense, posture, motor skill, proprioception  to assist with cervical, scapular, scapulothoracic and UE control with self care, reaching, carrying, lifting, house/yardwork, driving/computer work. Therapeutic Activities:    [x] (24541 or 53136) Provided verbal/tactile cueing for activities related to improving balance, coordination, kinesthetic sense, posture, motor skill, proprioception and motor activation to allow for proper function of cervical, scapular, scapulothoracic and UE control with self care, carrying, lifting, driving/computer work.      Home Exercise Program:    [x] (79466) Reviewed/Progressed HEP activities related to strengthening, flexibility, endurance, ROM of cervical, scapular, scapulothoracic and UE control with self care, reaching, carrying, lifting, house/yardwork, driving/computer work  [] (82823) Reviewed/Progressed HEP activities related to improving balance, coordination, kinesthetic sense, posture, motor skill, proprioception of cervical, scapular, scapulothoracic and UE control with self care, reaching, carrying, lifting, house/yardwork, driving/computer work      Manual Treatments:  PROM / STM / Oscillations-Mobs:  G-I, II, III, IV (PA's, Inf., Post.)  [x] (91865) Provided manual therapy to mobilize soft tissue/joints of cervical/CT, scapular GHJ and UE for the purpose of decreasing headache, modulating pain, promoting relaxation,  increasing ROM, reducing/eliminating soft tissue swelling/inflammation/restriction, improving soft tissue extensibility and allowing for proper ROM for normal function with self care, reaching, carrying, lifting, house/yardwork, driving/computer work      Charges:  Timed Code Treatment Minutes: 55   Total Treatment Minutes: 55     [] EVAL (LOW) 62188 (typically 20 minutes face-to-face)  [] EVAL (MOD) 54970 (typically 30 minutes face-to-face)  [] EVAL (HIGH) 05038 (typically 45 minutes face-to-face)  [] RE-EVAL     [x] PO(39443) x 1     [] Dry needle 1 or 2 Muscles (09094)  [] NMR (52447) x     [] Dry needle 3+ Muscles (35245)  [x] Manual (80733) x  2  [x] Ultrasound (04955) x 10 min  [] TA (89583) x 2  [] Mech Traction (72334) 15 min  [] ES(attended) (40100)     [] ES (un) (88103): 15 min  [] Vasopump (33406) [] Ionto (96812)   [] Other:Dry Needling    GOALS:Patient stated goal: \"Build muscle strength and relax muscle tension and range of motion\"  []? Progressing: []? Met: [x]? Not Met: []? Adjusted     Therapist goals for Patient:   Short Term Goals: To be achieved in: 2 weeks  1. Independent in HEP and progression per patient tolerance, in order to prevent re-injury. []? Progressing: [x]? Met: []? Not Met: []? Adjusted  2. Patient will have a decrease in pain to facilitate improvement in movement, function, and ADLs as indicated by Functional Deficits. (to be achieved in 6 weeks)  []? Progressing: []? Met: [x]? Not Met: []? Adjusted     Long Term Goals: To be achieved in:8 weeks  1. Disability index score of 40% or less for the NDI to assist with reaching prior level of function. []? Progressing: []? Met: [x]? Not Met: []? Adjusted  2. Patient will demonstrate increased AROM to [facility] of cervical/thoracic spine to allow for proper joint functioning as indicated by patients Functional Deficits. []? Progressing: []? Met: [x]? Not Met: []? Adjusted  3. Patient will demonstrate an increase in postural awareness and control and activation of  Deep cervical stabilizers to allow for proper functional mobility as indicated by patients Functional Deficits. []? Progressing: [x]? Met: []? Not Met: []? Adjusted  4. Patient will return to 60%functional activities without increased symptoms or restriction. []? Progressing: []? Met: [x]? Not Met: []? Adjusted  5. Pt will be able to rotate neck 10 degrees further.(patient specific functional goal)    []? Progressing: []? Met: [x]? Not Met: []? Adjusted               ASSESSMENT:  Patient has pain changes by the day. There has not been a consistent improvement in her condition with PT. Her cervical spinal ROM is actually less today than on initial eval and strength is about the same, but tremoring was noted upon resistance. Of concern is the paresthesia on both arms. She continues to have significant tightness on cervical spine, but feels as if dry needling was helpful. Treatment/Activity Tolerance:  [x] Patient tolerated treatment well [] Patient limited by fatique  [] Patient limited by pain  [] Patient limited by other medical complications  [] Other:     Overall Progression Towards Functional goals/ Treatment Progress Update:  [] Patient is progressing as expected towards functional goals listed. [] Progression is slowed due to complexities/Impairments listed. [] Progression has been slowed due to co-morbidities.   [] Plan just implemented, too soon to assess goals progression <30days   [] Goals require adjustment due to lack of progress  [x] Patient is not progressing as expected and requires additional follow up with physician  [] Other    Prognosis for POC: [x] Good [] Fair  [] Poor    Patient requires continued skilled intervention: [x] Yes  [] No        PLAN: Disregard DC note dated 3/5/21. Pt will continue with 2-3 x times weekly for 4-5 more weeks as needed, alternating between dry needling and traditional PT and  between two therapists (May Merlin and [employer]). [x] Continue per plan of care [] Alter current plan (see comments)  [] Plan of care initiated [] Hold pending MD visit [] Discharge     Electronically signed by: Kyra Guillen, PT    Note: If patient does not return for scheduled/recommended follow up visits, this note will serve as a discharge from care along with the most recent update on progress.

## 2021-03-29 ENCOUNTER — NURSE TRIAGE (OUTPATIENT)
Dept: OTHER | Facility: CLINIC | Age: 41
End: 2021-03-29

## 2021-03-29 ENCOUNTER — OFFICE VISIT (OUTPATIENT)
Dept: FAMILY MEDICINE CLINIC | Age: 41
End: 2021-03-29
Payer: COMMERCIAL

## 2021-03-29 VITALS
OXYGEN SATURATION: 98 % | SYSTOLIC BLOOD PRESSURE: 128 MMHG | RESPIRATION RATE: 14 BRPM | TEMPERATURE: 97.9 F | HEART RATE: 89 BPM | DIASTOLIC BLOOD PRESSURE: 72 MMHG | BODY MASS INDEX: 22.29 KG/M2 | WEIGHT: 142 LBS | HEIGHT: 67 IN

## 2021-03-29 DIAGNOSIS — R35.0 URINARY FREQUENCY: ICD-10-CM

## 2021-03-29 DIAGNOSIS — H61.892 IRRITATION OF EXTERNAL EAR CANAL, LEFT: Primary | ICD-10-CM

## 2021-03-29 LAB
BILIRUBIN, POC: NORMAL
BLOOD URINE, POC: NORMAL
CLARITY, POC: CLEAR
COLOR, POC: YELLOW
GLUCOSE URINE, POC: NORMAL
KETONES, POC: NORMAL
LEUKOCYTE EST, POC: NORMAL
NITRITE, POC: NORMAL
PH, POC: 7
PROTEIN, POC: NORMAL
SPECIFIC GRAVITY, POC: 1.02
UROBILINOGEN, POC: 0.2

## 2021-03-29 PROCEDURE — 81002 URINALYSIS NONAUTO W/O SCOPE: CPT | Performed by: FAMILY MEDICINE

## 2021-03-29 PROCEDURE — G8427 DOCREV CUR MEDS BY ELIG CLIN: HCPCS | Performed by: FAMILY MEDICINE

## 2021-03-29 PROCEDURE — G8484 FLU IMMUNIZE NO ADMIN: HCPCS | Performed by: FAMILY MEDICINE

## 2021-03-29 PROCEDURE — G8420 CALC BMI NORM PARAMETERS: HCPCS | Performed by: FAMILY MEDICINE

## 2021-03-29 PROCEDURE — 99213 OFFICE O/P EST LOW 20 MIN: CPT | Performed by: FAMILY MEDICINE

## 2021-03-29 PROCEDURE — 4004F PT TOBACCO SCREEN RCVD TLK: CPT | Performed by: FAMILY MEDICINE

## 2021-03-29 RX ORDER — CIPROFLOXACIN AND DEXAMETHASONE 3; 1 MG/ML; MG/ML
4 SUSPENSION/ DROPS AURICULAR (OTIC) 2 TIMES DAILY
Qty: 1 BOTTLE | Refills: 0 | Status: SHIPPED | OUTPATIENT
Start: 2021-03-29 | End: 2021-04-05 | Stop reason: SDUPTHER

## 2021-03-29 NOTE — TELEPHONE ENCOUNTER
Received call from Latonya Back 894 at pre-service center [facility]) [place] with Red Flag Complaint. Brief description of triage: Had a but around her left ear, feels like it is in her ear. Yesterday the left ear had a throbbing sensation, warm to the touch, now is bright red. Now feeling the same sensation in her right ear     Triage indicates for patient to seen in office today     Care advice provided, patient verbalizes understanding; denies any other questions or concerns; instructed to call back for any new or worsening symptoms. Writer provided warm transfer to [place] at [facility] for appointment scheduling. Attention Provider: Thank you for allowing me to participate in the care of your patient. The patient was connected to triage in response to information provided to the [facility]. Please do not respond through this encounter as the response is not directed to a shared pool. Reason for Disposition   All other foreign bodies in ear canal (Exception: insect or small smooth foreign body)    Answer Assessment - Initial Assessment Questions  1. OBJECT: \"What do you think it is? \"       A bug    2. PAIN: \"Is it causing any pain? \" If so, \"How bad is it? \" (e.g., mild, moderate, severe)      Not pain but discomfort     3. ONSET: \"How long do you think it's been in there?\"      03/27/21    4. DISCHARGE: \"Has there been any discharge or bleeding from the ear? \" If so, \"Please describe it\". Denies     5. PREGNANCY: \"Is there any chance you are pregnant? \" \"When was your last menstrual period? \"      Denies    Protocols used: EAR - FOREIGN BODY-ADULT-OH

## 2021-03-29 NOTE — PROGRESS NOTES
3/29/2021    This is a 38 y.o. female   Chief Complaint   Patient presents with    Otalgia     Patient has been having left ear pain and redness. She is concerned she may have had an insect fly in her ear on Saturday.  Urinary Frequency     Patient feeling need to urinate more frequently but sometimes only dribbles x1 week     HPI    Over the weekend, was lying in bed with the window open. A bug flew next to her ear and then she heard the buzzing inside her ear. She has concerns that the bug or something may be lodged in there. Tried to flush with water and is unsure if anything came out. Tried warm olive oil drops and took Tylenol. Yesterday, she noticed her L ear looked red and irritated. Today her discomfort is not as significant but wanted to have it checked out and her ear canal examined. Has noticed some urinary frequency and urgency. Not always able to fully urinate, but usually feels full. No dysuria. No vaginal discharge. No flank or lower abdominal pain.     Review of Systems     Past Medical History:   Diagnosis Date    Bipolar disorder ([facility] [address]     Pt was dx age 14    HPV in female     Medical history reviewed with no changes     Ovarian cyst     Ovarian cyst rupture        Past Surgical History:   Procedure Laterality Date    BACK SURGERY  06/2013    LUMBAR FUSION    CERVIX SURGERY      biopsy    PAIN MANAGEMENT PROCEDURE N/A 2/18/2020    CERVICAL SIX SEVEN INTERLAMINAR EPIDURAL STEROID INJECTION SITE CONFIRMED BY FLUOROSCOPY performed by Елена Al MD at [address] [place] [address]  2013    L5 S1    TUBAL LIGATION  11/2010       Family History   Problem Relation Age of Onset    Asthma Father     Other Father     Cancer Maternal Aunt         ovarian and cervical     Asthma Maternal Aunt     Hyperthyroidism Maternal Aunt     Cancer Maternal Uncle     Prostate Cancer Paternal Uncle     Breast Cancer Maternal Grandmother     Diabetes Maternal Grandfather     High Blood Pressure Maternal Grandfather     Prostate Cancer Paternal Grandfather        Current Outpatient Medications   Medication Sig Dispense Refill    ciprofloxacin-dexamethasone (CIPRODEX) 0.3-0.1 % otic suspension Place 4 drops into the left ear 2 times daily for 7 days 1 Bottle 0    verapamil (CALAN SR) 120 MG extended release tablet Take 1 tablet by mouth daily 30 tablet 1    naproxen (NAPROSYN) 500 MG tablet Take 1 tablet by mouth 2 times daily (with meals) 60 tablet 2    gabapentin (NEURONTIN) 100 MG capsule Take 1 capsule by mouth 3 times daily as needed (nerve pain) for up to 60 days. Intended supply: 90 days 90 capsule 1    omeprazole (PRILOSEC) 20 MG delayed release capsule Take 1 capsule by mouth daily 30 capsule 0    albuterol sulfate HFA (VENTOLIN HFA) 108 (90 Base) MCG/ACT inhaler Inhale 2 puffs into the lungs 4 times daily as needed for Wheezing 1 Inhaler 5    tiZANidine (ZANAFLEX) 2 MG tablet Take 1 tablet by mouth nightly as needed (neck pain) 10 tablet 0    docusate sodium (COLACE) 100 MG capsule Take 100 mg by mouth 2 times daily      senna (SENOKOT) 8.6 MG tablet Take 1 tablet by mouth daily      melatonin 3 MG TABS tablet Take 3 mg by mouth daily      acetaminophen (APAP EXTRA STRENGTH) 500 MG tablet Take 2 tablets by mouth every 6 hours as needed for Pain DO NOT TAKE WITH OTHER MEDICATIONS CONTAINING ACETAMINOPHEN. 30 tablet 0    ibuprofen (ADVIL;MOTRIN) 800 MG tablet Take 1 tablet by mouth 3 times daily (with meals) (Patient not taking: Reported on 3/29/2021) 90 tablet 0    nicotine (NICOTROL) 10 MG/ML SOLN nasal spray 1 spray by Nasal route every hour as needed for Smoking cessation (Patient not taking: Reported on 3/29/2021) 2 Bottle 5    nicotine (NICODERM CQ) 14 MG/24HR Place 1 patch onto the skin every 24 hours (Patient not taking: Reported on 1/14/2021) 30 patch 2     No current facility-administered medications for this visit.         /72   Pulse 89   Temp 97.9 °F (36.6 °C)   Resp 14   Ht 5' 7\" (1.702 m)   Wt 142 lb (64.4 kg)   SpO2 98%   BMI 22.24 kg/m²     Physical Exam  HENT:      Ears:      Comments: L ear canal irritation, normal L TM  R ear canal and R TM normal  Neck:      Musculoskeletal: No neck rigidity. Lymphadenopathy:      Cervical: No cervical adenopathy. Wt Readings from Last 3 Encounters:   03/29/21 142 lb (64.4 kg)   03/19/21 143 lb 9.6 oz (65.1 kg)   03/16/21 143 lb 15.4 oz (65.3 kg)     BP Readings from Last 3 Encounters:   03/29/21 128/72   03/19/21 (!) 144/94   03/16/21 118/78     Assessment/Plan:  1. Irritation of external ear canal, left  No foreign body noted. She likely had something and that was dislodged or washed out on her own. Her symptoms are improving. She can use Ciprodex as needed  - ciprofloxacin-dexamethasone (CIPRODEX) 0.3-0.1 % otic suspension; Place 4 drops into the left ear 2 times daily for 7 days  Dispense: 1 Bottle; Refill: 0    2. Urinary frequency  This may be from caffeine. We will check and follow urine culture  - POCT Urinalysis no Micro  - Culture, Urine      Return if symptoms worsen or fail to improve.

## 2021-03-30 LAB — URINE CULTURE, ROUTINE: NORMAL

## 2021-03-31 ENCOUNTER — HOSPITAL ENCOUNTER (OUTPATIENT)
Dept: PHYSICAL THERAPY | Age: 41
Setting detail: THERAPIES SERIES
Discharge: HOME | End: 2021-03-31
Payer: COMMERCIAL

## 2021-03-31 PROCEDURE — 97140 MANUAL THERAPY 1/> REGIONS: CPT

## 2021-03-31 PROCEDURE — 97110 THERAPEUTIC EXERCISES: CPT

## 2021-03-31 PROCEDURE — 20561 NDL INSJ W/O NJX 3+ MUSC: CPT

## 2021-03-31 NOTE — FLOWSHEET NOTE
[address] [facility] [place] [place] [address]  Phone: [phone]   Fax:     [phone]    Physical Therapy Treatment Note/ Progress Report:            Date:  3/31/2021    Patient Name:  Lana Mueller    :  1980  MRN: [id number]    Pertinent Medical History:      Medical/Treatment Diagnosis Information:  · Diagnosis: M54.81 (ICD-10-CM) - Occipital neuralgia  · Treatment Diagnosis: Pain in neck and headaches, limited mobility of cervical spine , weakness in UE's, tightness of cervical muscles    Insurance/Certification information:  PT Insurance Information: [address] [place] [address] [facility]  Physician Information:  Referring Practitioner: Romana Britain, MD  Plan of care signed (Y/N): Sent to Dr. 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 She then had lumbar fusion surgery in . Spinal pain continued and worsened and she has had PT, aquatic therapy, chiropractic care, pain management, , and medication. Dr. Dave Guthrie attempted to give pt injection on 21 and the muscles were too tight and she could not effectively give the injection. Dr. Dave Guthrie referred her to PT for building  muscle strength , and relaxing muscle tissue. Relief is only temporary.     SUBJECTIVE:    2/3/21: Headaches are less, but spasms in LE's are worse at nights and weakness in both arms is worse. 02/05/21; Patient reports H/A are not as bad,sore on left side of the neck,spasms and weakness in her arms are about the same. 2/8/21: Pt reports that she was ok through most of the day on Saturday and around 4:00 PM she had increase pain on left side of head, headaches and arm pain and soreness too. She took a muscle relaxer last night, and she slept well last night but when she took Gabapentin and muscle relaxer on Saturday it made no difference. She lifted a few things between 12:00 and 3:00 on Saturday. (This may be the activity that caused increase of pain.)  2/11/21: Pt reports that she has tried heat, TENS unit and ice, rest and taking Neurontin. She started the  Prednisone yesterday afternoon. She has been worse, but is a little better today. 02/15/21: Patient reports she has been feeling a little better since she started taking Prednisone. States her H/A and tingling down her arms has been about the same. 2/19/21: Pt reports that her neck  and lumbar pain switch back between 4-5/10. She has completed predinsone and it is not too bad.  2/22/21: \"I had a really good weekend and pain decreased to 3/10, but I woke up sore this morning. I have been a lot more tired lately. I have slept better. \"  2/24/21: Pt reports that she has nerve pain in both arms with burning and tingling. Her \"neck is not that bad, she states\". She spent time in front of computer yesterday, but was not very physical. The UE still fatigue very  Easlily. 3/1/21 Pt reports having had bad headaches Saturday but not as bad today. 3/3/21 Pt reports decreased pain for a few hours post last Rx but did experience some nausea latter that same evening. Does not have nausea at this time. She expressed interest in continuing dry needling today.    3/5/21: Pt reports that the Wednesday session of dry needling was helpful and felt longer relief and had a good day yesterday. She was active yesterday too. Today, pain is worse again. 3/15/21 Pt reports some relief from the dry needling.  3/24/21: Pt reports that dry needling is helpful. Since last appt, she has been into ED for double vision and migraine headaches, lightheadedness and dizziness. She has an order for an MRI and will see eye doctor next Tuesday and the following week, she will see neurologist and then the rheumatologist.  3/31/21 Pt reports increased c/o neck and headache pain for the last few days. OBJECTIVE: 3/5/21   Observation:    Test measurements:  Cervical flexion: 9 with pain  Extension: 3 with  pain   SB L:  21  SB R:  18  ROT L:  30   ROT R:  32                                        UE ROM:  Flexion:   130 bilaterally     Abd:  Left 120   R:  150   Int rot:   60 bilaterally                                        Strength: All major muscle groups of UE test as 3/5 with some tremoring upon resistance. Sensation:  Same only on both forearms. Oterwise, there are differences in sensation on all other dermatomes. Palpation:  Still very tender and significantly tight    RESTRICTIONS/PRECAUTIONS:   Exercises/Interventions:   Therapeutic Ex (32533)  Min: Resistance/Repetitions Notes   Dorsal glides         To help with LE spasms   To help with LE spasms   OH pulley 2/24/21   Seated cerv. Rotation     Seated cerv. ext    Supine towel roll tucks     Seated chin tucks  Post DN   Bicep curls    Shoulder flexion    Scaption    Seated cerv.  UT stretch  30\" x 2 L/R ea    Scapula elevation and retraction  Retraction 20 x   Elevation with  forward and backward rotation 20 x ea Post DN   Manual Intervention (18276)  Min: 25 15 min    Cerv mobs/manip O   Thoracic mobs/manip Prone for thoracic trigger points release and PA's -10 min    CT manip     Rib mobilizations     STM bilat dorsal cerv. pvm's 10 min         NMR re-education (39865)  Min:     Theracane         Therapeutic Activity (11766)  Min: 10     Discussed posture and gave brochure on spinal care and safety of wrokstation          Modalities  Min:      [id number] min at 1.6 W/ccm2 while prone   E stim    Has this at home           Dry needling manual therapy: consisted on the placement of 6 needles in the following muscles:  Splenius capitus L/R, Semispinalis capitus , cervical multifidus C6-7 L/R  And left upper trap. .  A 40 and 60  mm needle was inserted, piston, rotated, and coned to produce intramuscular mobilization. These techniques were used to restore functional range of motion, reduce muscle spasm and induce healing in the corresponding musculature. (95155)  Clean Technique was utilized today while applying Dry needling treatment. The treatment sites where cleaned with 70% solution of  isopropyl alcohol . The PT washed their hands and utilized treatment gloves along with hand  prior to inserting the needles. All needles where removed and discarded in the appropriate sharps container. MD has given verbal and/or written approval for this treatment. Attended low frequency (2-3Hz) electrical stimulation was utilized in conjunction with Dry Needling:  the Estim was manipulated between all above needles for a period of 10 min. at 9 volts. The low frequency electrical stimulation was used to help reduce muscle spasm and help to interrupt /[place] the pain cycle. (52686)     Other Therapeutic Activities:  Pt was educated on PT POC, Diagnosis, Prognosis, pathomechanics as well as frequency and duration of scheduling future physical therapy appointments. Time was also taken on this day to answer all patient questions and participation in PT. Reviewed appointment policy in detail with patient and patient verbalized understanding.  2/8/21: Pt felt well with theracane trial and PT wrote the name for her to look into for possible purchase, if pt so with self care, reaching, carrying, lifting, house/yardwork, driving/computer work  [] (75762) Reviewed/Progressed HEP activities related to improving balance, coordination, kinesthetic sense, posture, motor skill, proprioception of cervical, scapular, scapulothoracic and UE control with self care, reaching, carrying, lifting, house/yardwork, driving/computer work      Manual Treatments:  PROM / STM / Oscillations-Mobs:  G-I, II, III, IV (PA's, Inf., Post.)  [x] (68623) Provided manual therapy to mobilize soft tissue/joints of cervical/CT, scapular GHJ and UE for the purpose of decreasing headache, modulating pain, promoting relaxation,  increasing ROM, reducing/eliminating soft tissue swelling/inflammation/restriction, improving soft tissue extensibility and allowing for proper ROM for normal function with self care, reaching, carrying, lifting, house/yardwork, driving/computer work      Charges:  Timed Code Treatment Minutes: 50   Total Treatment Minutes: 55     [] EVAL (LOW) 87413 (typically 20 minutes face-to-face)  [] EVAL (MOD) 13842 (typically 30 minutes face-to-face)  [] EVAL (HIGH) 36792 (typically 45 minutes face-to-face)  [] RE-EVAL     [x] TV(98080) x 1     [] Dry needle 1 or 2 Muscles (26240)  [] NMR (69747) x     [x] Dry needle 3+ Muscles (97264)  [x] Manual (61621) x    [] Ultrasound (05491) x 10 min  [] TA (28053) x 2  [] Mech Traction [phone] 15 min  [] ES(attended) (18505)     [] ES (un) (31492): 15 min  [] Vasopump (46568) [] Ionto (65074)   [] Other:Dry Needling    GOALS:Patient stated goal: \"Build muscle strength and relax muscle tension and range of motion\"  []? Progressing: []? Met: [x]? Not Met: []? Adjusted     Therapist goals for Patient:   Short Term Goals: To be achieved in: 2 weeks  1. Independent in HEP and progression per patient tolerance, in order to prevent re-injury. []? Progressing: [x]? Met: []? Not Met: []? Adjusted  2.  Patient will have a decrease in pain to facilitate improvement in movement, function, and ADLs as indicated by Functional Deficits. (to be achieved in 6 weeks)  []? Progressing: []? Met: [x]? Not Met: []? Adjusted     Long Term Goals: To be achieved in:8 weeks  1. Disability index score of 40% or less for the NDI to assist with reaching prior level of function. []? Progressing: []? Met: [x]? Not Met: []? Adjusted  2. Patient will demonstrate increased AROM to [facility] of cervical/thoracic spine to allow for proper joint functioning as indicated by patients Functional Deficits. []? Progressing: []? Met: [x]? Not Met: []? Adjusted  3. Patient will demonstrate an increase in postural awareness and control and activation of  Deep cervical stabilizers to allow for proper functional mobility as indicated by patients Functional Deficits. []? Progressing: [x]? Met: []? Not Met: []? Adjusted  4. Patient will return to 60%functional activities without increased symptoms or restriction. []? Progressing: []? Met: [x]? Not Met: []? Adjusted  5. Pt will be able to rotate neck 10 degrees further.(patient specific functional goal)    []? Progressing: []? Met: [x]? Not Met: []? Adjusted               ASSESSMENT:  Patient has pain changes by the day. There has not been a consistent improvement in her condition with PT. Her cervical spinal ROM is actually less today than on initial eval and strength is about the same, but tremoring was noted upon resistance. Of concern is the paresthesia on both arms. She continues to have significant tightness on cervical spine, but feels as if dry needling was helpful. Treatment/Activity Tolerance:  [x] Patient tolerated treatment well [] Patient limited by fatique  [] Patient limited by pain  [] Patient limited by other medical complications  [] Other:     Overall Progression Towards Functional goals/ Treatment Progress Update:  [] Patient is progressing as expected towards functional goals listed.     [] Progression is slowed due to complexities/Impairments listed. [] Progression has been slowed due to co-morbidities. [] Plan just implemented, too soon to assess goals progression <30days   [] Goals require adjustment due to lack of progress  [x] Patient is not progressing as expected and requires additional follow up with physician  [] Other    Prognosis for POC: [x] Good [] Fair  [] Poor    Patient requires continued skilled intervention: [x] Yes  [] No        PLAN: Disregard DC note dated 3/5/21. Pt will continue with 2-3 x times weekly for 4-5 more weeks as needed, alternating between dry needling and traditional PT and  between two therapists (Evelyn Vergara and [employer]). [x] Continue per plan of care [] Alter current plan (see comments)  [] Plan of care initiated [] Hold pending MD visit [] Discharge     Electronically signed by: Alejandro Michael, PT    Note: If patient does not return for scheduled/recommended follow up visits, this note will serve as a discharge from care along with the most recent update on progress.

## 2021-04-05 ENCOUNTER — PATIENT MESSAGE (OUTPATIENT)
Dept: FAMILY MEDICINE CLINIC | Age: 41
End: 2021-04-05

## 2021-04-05 ENCOUNTER — HOSPITAL ENCOUNTER (OUTPATIENT)
Dept: PHYSICAL THERAPY | Age: 41
Setting detail: THERAPIES SERIES
Discharge: HOME | End: 2021-04-05
Payer: COMMERCIAL

## 2021-04-05 DIAGNOSIS — M79.601 BILATERAL ARM PAIN: ICD-10-CM

## 2021-04-05 DIAGNOSIS — M79.602 BILATERAL ARM PAIN: ICD-10-CM

## 2021-04-05 DIAGNOSIS — H61.892 IRRITATION OF EXTERNAL EAR CANAL, LEFT: ICD-10-CM

## 2021-04-05 PROCEDURE — 97012 MECHANICAL TRACTION THERAPY: CPT

## 2021-04-05 PROCEDURE — 97035 APP MDLTY 1+ULTRASOUND EA 15: CPT

## 2021-04-05 PROCEDURE — 97140 MANUAL THERAPY 1/> REGIONS: CPT

## 2021-04-05 RX ORDER — CIPROFLOXACIN AND DEXAMETHASONE 3; 1 MG/ML; MG/ML
4 SUSPENSION/ DROPS AURICULAR (OTIC) 2 TIMES DAILY
Qty: 1 BOTTLE | Refills: 0 | Status: SHIPPED | OUTPATIENT
Start: 2021-04-05 | End: 2021-04-12

## 2021-04-05 RX ORDER — GABAPENTIN 300 MG/1
300 CAPSULE ORAL 3 TIMES DAILY PRN
Qty: 90 CAPSULE | Refills: 2 | Status: SHIPPED | OUTPATIENT
Start: 2021-04-05 | End: 2021-07-12

## 2021-04-05 NOTE — FLOWSHEET NOTE
[address] [facility] [place] [place] [address]  Phone: [phone]   Fax:     [phone]    Physical Therapy Treatment Note/ Progress Report:            Date:  2021    Patient Name:  Vilma Grider    :  1980  MRN: [id number]    Pertinent Medical History:      Medical/Treatment Diagnosis Information:  · Diagnosis: M54.81 (ICD-10-CM) - Occipital neuralgia  · Treatment Diagnosis: Pain in neck and headaches, limited mobility of cervical spine , weakness in UE's, tightness of cervical muscles    Insurance/Certification information:  PT Insurance Information: [address] [place] [address] [facility]  Physician Information:  Referring Practitioner: Azul Vance MD  Plan of care signed (Y/N): Sent to Dr. 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 She then had lumbar fusion surgery in . Spinal pain continued and worsened and she has had PT, aquatic therapy, chiropractic care, pain management, , and medication. Dr. Shlomo Rinne attempted to give pt injection on 21 and the muscles were too tight and she could not effectively give the injection. Dr. Shlomo Rinne referred her to PT for building  muscle strength , and relaxing muscle tissue. Relief is only temporary.     SUBJECTIVE:    2/3/21: Headaches are less, but spasms in LE's are worse at nights and weakness in both arms is worse. 02/05/21; Patient reports H/A are not as bad,sore on left side of the neck,spasms and weakness in her arms are about the same. 2/8/21: Pt reports that she was ok through most of the day on Saturday and around 4:00 PM she had increase pain on left side of head, headaches and arm pain and soreness too. She took a muscle relaxer last night, and she slept well last night but when she took Gabapentin and muscle relaxer on Saturday it made no difference. She lifted a few things between 12:00 and 3:00 on Saturday. (This may be the activity that caused increase of pain.)  2/11/21: Pt reports that she has tried heat, TENS unit and ice, rest and taking Neurontin. She started the  Prednisone yesterday afternoon. She has been worse, but is a little better today. 02/15/21: Patient reports she has been feeling a little better since she started taking Prednisone. States her H/A and tingling down her arms has been about the same. 2/19/21: Pt reports that her neck  and lumbar pain switch back between 4-5/10. She has completed predinsone and it is not too bad.  2/22/21: \"I had a really good weekend and pain decreased to 3/10, but I woke up sore this morning. I have been a lot more tired lately. I have slept better. \"  2/24/21: Pt reports that she has nerve pain in both arms with burning and tingling. Her \"neck is not that bad, she states\". She spent time in front of computer yesterday, but was not very physical. The UE still fatigue very  Easlily. 3/1/21 Pt reports having had bad headaches Saturday but not as bad today. 3/3/21 Pt reports decreased pain for a few hours post last Rx but did experience some nausea latter that same evening. Does not have nausea at this time. She expressed interest in continuing dry needling today.    3/5/21: Pt reports that the Wednesday session of dry needling was helpful and felt longer relief and had min    Cerv mobs/manip O   Thoracic mobs/manip Prone for thoracic trigger points release and PA's -10 min    CT manip     Rib mobilizations     STM bilat dorsal cerv. pvm's 10 min         NMR re-education (39412)  Min:     Theracane         Therapeutic Activity (19372)  Min: 10     Discussed posture and gave brochure on spinal care and safety of wrokstation          Modalities  Min:      Hsvablftzd01 min at 1.6 W/ccm2 while prone   E stim    Has this at home   Int Cervical traction with MHP 14# x 15 min  Good response 4/5/21       Other Therapeutic Activities:  Pt was educated on PT POC, Diagnosis, Prognosis, pathomechanics as well as frequency and duration of scheduling future physical therapy appointments. Time was also taken on this day to answer all patient questions and participation in PT. Reviewed appointment policy in detail with patient and patient verbalized understanding. 2/8/21: Pt felt well with theracane trial and PT wrote the name for her to look into for possible purchase, if pt so desires. 2/11/21: Kiley Suh, PT spoke with pt and educated her about dry needling as a possible treatment. This therapist called Dr. Corinne Velazquez about this treatment and asked registrar to inquire about insurance coverage. 2/19/21: Gave pt further information to consider regarding dry needling treatment. 3/5/21: Pt and PT reviewed NDI and goals and discussed her current condition and changes with PT at length. Reviewed proper body mechanics principles and proper posture. Gave brochures on posture and body mechanics. 3/3/21: Lengthy discussion with patient to update therapist on her condition and insurance allowance. Also gave information about and discussed Gentile home cervical traction device. Home Exercise Program: Jeramie Gave access code [id number] given today.     Therapeutic Exercise and NMR EXR  [x] (07390) Provided verbal/tactile cueing for activities related to strengthening, flexibility, endurance, ROM  for improvements in cervical, postural, scapular, scapulothoracic and UE control with self care, reaching, carrying, lifting, house/yardwork, driving/computer work.    [] (37950) Provided verbal/tactile cueing for activities related to improving balance, coordination, kinesthetic sense, posture, motor skill, proprioception  to assist with cervical, scapular, scapulothoracic and UE control with self care, reaching, carrying, lifting, house/yardwork, driving/computer work. Therapeutic Activities:    [x] (02346 or 64341) Provided verbal/tactile cueing for activities related to improving balance, coordination, kinesthetic sense, posture, motor skill, proprioception and motor activation to allow for proper function of cervical, scapular, scapulothoracic and UE control with self care, carrying, lifting, driving/computer work.      Home Exercise Program:    [x] (96286) Reviewed/Progressed HEP activities related to strengthening, flexibility, endurance, ROM of cervical, scapular, scapulothoracic and UE control with self care, reaching, carrying, lifting, house/yardwork, driving/computer work  [] (31923) Reviewed/Progressed HEP activities related to improving balance, coordination, kinesthetic sense, posture, motor skill, proprioception of cervical, scapular, scapulothoracic and UE control with self care, reaching, carrying, lifting, house/yardwork, driving/computer work      Manual Treatments:  PROM / STM / Oscillations-Mobs:  G-I, II, III, IV (PA's, Inf., Post.)  [x] (11878) Provided manual therapy to mobilize soft tissue/joints of cervical/CT, scapular GHJ and UE for the purpose of decreasing headache, modulating pain, promoting relaxation,  increasing ROM, reducing/eliminating soft tissue swelling/inflammation/restriction, improving soft tissue extensibility and allowing for proper ROM for normal function with self care, reaching, carrying, lifting, house/yardwork, driving/computer work      Charges:  Timed Code Treatment Minutes: 35   Total Treatment Minutes: 50     [] EVAL (LOW) 37330 (typically 20 minutes face-to-face)  [] EVAL (MOD) 84974 (typically 30 minutes face-to-face)  [] EVAL (HIGH) 77308 (typically 45 minutes face-to-face)  [] RE-EVAL     [] RE(31028) x      [] Dry needle 1 or 2 Muscles (59074)  [] NMR (50955) x     [] Dry needle 3+ Muscles (34737)  [x] Manual (11444) x 1   [x] Ultrasound (04539) x 10 min  [] TA (93806) x 2  [x] Mech Traction (41992) 15 min  [] ES(attended) (77585)     [] ES (un) (43016): 15 min  [] Vasopump (19637) [] Ionto (24574)   [] Other:Dry Needling    GOALS:Patient stated goal: \"Build muscle strength and relax muscle tension and range of motion\"  []? Progressing: []? Met: [x]? Not Met: []? Adjusted     Therapist goals for Patient:   Short Term Goals: To be achieved in: 2 weeks  1. Independent in HEP and progression per patient tolerance, in order to prevent re-injury. []? Progressing: [x]? Met: []? Not Met: []? Adjusted  2. Patient will have a decrease in pain to facilitate improvement in movement, function, and ADLs as indicated by Functional Deficits. (to be achieved in 6 weeks)  []? Progressing: []? Met: [x]? Not Met: []? Adjusted     Long Term Goals: To be achieved in:8 weeks  1. Disability index score of 40% or less for the NDI to assist with reaching prior level of function. []? Progressing: []? Met: [x]? Not Met: []? Adjusted  2. Patient will demonstrate increased AROM to [facility] of cervical/thoracic spine to allow for proper joint functioning as indicated by patients Functional Deficits. []? Progressing: []? Met: [x]? Not Met: []? Adjusted  3. Patient will demonstrate an increase in postural awareness and control and activation of  Deep cervical stabilizers to allow for proper functional mobility as indicated by patients Functional Deficits. []? Progressing: [x]? Met: []? Not Met: []? Adjusted  4. Patient will return to 60%functional activities without increased symptoms or restriction. []? Progressing: []? Met: [x]? Not Met: []? Adjusted  5. Pt will be able to rotate neck 10 degrees further.(patient specific functional goal)    []? Progressing: []? Met: [x]? Not Met: []? Adjusted               ASSESSMENT:  Patient has pain changes by the day. There has not been a consistent improvement in her condition with PT. Her cervical spinal ROM is actually less today than on initial eval and strength is about the same, but tremoring was noted upon resistance. Of concern is the paresthesia on both arms. She continues to have significant tightness on cervical spine, but feels as if dry needling was helpful. Treatment/Activity Tolerance:  [x] Patient tolerated treatment well [] Patient limited by fatique  [] Patient limited by pain  [] Patient limited by other medical complications  [] Other:     Overall Progression Towards Functional goals/ Treatment Progress Update:  [] Patient is progressing as expected towards functional goals listed. [] Progression is slowed due to complexities/Impairments listed. [x] Progression has been slowed due to co-morbidities. [] Plan just implemented, too soon to assess goals progression <30days   [] Goals require adjustment due to lack of progress  [] Patient is not progressing as expected and requires additional follow up with physician  [] Other    Prognosis for POC: [x] Good [] Fair  [] Poor    Patient requires continued skilled intervention: [x] Yes  [] No        PLAN:  Pt will continue with 2-3 x times weekly for 4-5 more weeks as needed, alternating between dry needling and traditional PT and  between two therapists (Natalee Tobias and [employer]).      [x] Continue per plan of care [] Alter current plan (see comments)  [] Plan of care initiated [] Hold pending MD visit [] Discharge     Electronically signed by: Nya Dean, PT    Note: If patient does not return for scheduled/recommended follow up visits, this note will serve as a discharge from care along with the most recent update on progress.

## 2021-04-05 NOTE — TELEPHONE ENCOUNTER
From: Verner Richmond  To: Rodo Rodney MD  Sent: 4/5/2021 6:44 AM EDT  Subject: Test Results Question    Dr. Sona Cloud,    Thanks for following up and letting me know about the results. That is good news! However, doesn't then explain my symptoms happening again. As I said I frequently feel like my bladder is full. Along with the sensation of needing to urinating. Which is often a feeling of urgency. But it's varies if I can urinate and how much. On a different note I realized I need a refill on the neurotin again. Since I'm taking it the 3 x daily. But often having to take more than 1 capsule of the prescribed 100 mg. I also tried to  the ear drops. But I was told they're not covered by insurance. The pharmacy has been trying to get them approved. Or get recommendation and approval for a different script that's covered. The last thing is it appears unfortunately the new prescription. You wrote of the blood pressure med to prevent migraines. Is no longer working for that purpose. I've been getting them every day again. An the neck pain with the shooting stabbing pain at the the base of my skull. I have the appointment with neurology tomorrow. So we'll see what happens there. I'm not scheduled with Dr. Allan Guzman till May again. But physical therapy has suggested. Follow up with her about the cervical MRI again. Along with attempting the steroid injections again and/or a nerve block.      Thanks,    Rosanne Cummings  [phone] cell phone

## 2021-04-07 ENCOUNTER — PATIENT MESSAGE (OUTPATIENT)
Dept: FAMILY MEDICINE CLINIC | Age: 41
End: 2021-04-07

## 2021-04-07 ENCOUNTER — HOSPITAL ENCOUNTER (OUTPATIENT)
Dept: PHYSICAL THERAPY | Age: 41
Setting detail: THERAPIES SERIES
Discharge: HOME | End: 2021-04-07
Payer: COMMERCIAL

## 2021-04-07 DIAGNOSIS — G89.29 CHRONIC NONINTRACTABLE HEADACHE, UNSPECIFIED HEADACHE TYPE: ICD-10-CM

## 2021-04-07 DIAGNOSIS — R51.9 CHRONIC NONINTRACTABLE HEADACHE, UNSPECIFIED HEADACHE TYPE: ICD-10-CM

## 2021-04-07 PROCEDURE — 97140 MANUAL THERAPY 1/> REGIONS: CPT

## 2021-04-07 PROCEDURE — 97035 APP MDLTY 1+ULTRASOUND EA 15: CPT

## 2021-04-07 PROCEDURE — 97012 MECHANICAL TRACTION THERAPY: CPT

## 2021-04-07 NOTE — TELEPHONE ENCOUNTER
From: Miri Bland  To: Karan Coronado MD  Sent: 4/7/2021 8:30 AM EDT  Subject: Prescription Question    Good morning Dr. Hawa Aldridge,     I'm reaching after meeting with the neurologist, Dr. Jun Espino, yesterday. She has put in a new order through  for the brain MRI. We discussed ALL symptoms, etc. at length. An have come up with a current treatment plan. That being said, she and I both are concerned about my BP. As it's consistently been running high. Even since you prescribed the BP med. She feels I should discuss that with you for farther evaluation and increase of said medication. As I will be limited by other possible meds for treatment with while my BP is unstable. Please let me know how you want to proceed.     Thanks,    Truman Perera   [phone] cell phone

## 2021-04-07 NOTE — FLOWSHEET NOTE
[address] [facility] [place] [place] [address]  Phone: [phone]   Fax:     [phone]    Physical Therapy Treatment Note/ Progress Report:            Date:  2021    Patient Name:  Lana Mueller    :  1980  MRN: [id number]    Pertinent Medical History:      Medical/Treatment Diagnosis Information:  · Diagnosis: M54.81 (ICD-10-CM) - Occipital neuralgia  · Treatment Diagnosis: Pain in neck and headaches, limited mobility of cervical spine , weakness in UE's, tightness of cervical muscles    Insurance/Certification information:  PT Insurance Information: Sammy Mason  Physician Information:  Referring Practitioner: Romana Britain, MD  Plan of care signed (Y/N): Sent to Dr. 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 She then had lumbar fusion surgery in . Spinal pain continued and worsened and she has had PT, aquatic therapy, chiropractic care, pain management, , and medication. Dr. Dave Guthrie attempted to give pt injection on 21 and the muscles were too tight and she could not effectively give the injection. Dr. Dave Guthrie referred her to PT for building  muscle strength , and relaxing muscle tissue. Relief is only temporary.     SUBJECTIVE:    2/3/21: Headaches are less, but spasms in LE's are worse at nights and weakness in both arms is worse. 02/05/21; Patient reports H/A are not as bad,sore on left side of the neck,spasms and weakness in her arms are about the same. 2/8/21: Pt reports that she was ok through most of the day on Saturday and around 4:00 PM she had increase pain on left side of head, headaches and arm pain and soreness too. She took a muscle relaxer last night, and she slept well last night but when she took Gabapentin and muscle relaxer on Saturday it made no difference. She lifted a few things between 12:00 and 3:00 on Saturday. (This may be the activity that caused increase of pain.)  2/11/21: Pt reports that she has tried heat, TENS unit and ice, rest and taking Neurontin. She started the  Prednisone yesterday afternoon. She has been worse, but is a little better today. 02/15/21: Patient reports she has been feeling a little better since she started taking Prednisone. States her H/A and tingling down her arms has been about the same. 2/19/21: Pt reports that her neck  and lumbar pain switch back between 4-5/10. She has completed predinsone and it is not too bad.  2/22/21: \"I had a really good weekend and pain decreased to 3/10, but I woke up sore this morning. I have been a lot more tired lately. I have slept better. \"  2/24/21: Pt reports that she has nerve pain in both arms with burning and tingling. Her \"neck is not that bad, she states\". She spent time in front of computer yesterday, but was not very physical. The UE still fatigue very  easlily. 3/1/21 Pt reports having had bad headaches Saturday but not as bad today. 3/3/21 Pt reports decreased pain for a few hours post last Rx but did experience some nausea latter that same evening. Does not have nausea at this time. She expressed interest in continuing dry needling today.    3/5/21: Pt reports that the Wednesday session of dry needling was helpful and felt longer relief and had a good day yesterday. She was active yesterday too. Today, pain is worse again. 3/15/21 Pt reports some relief from the dry needling.  3/24/21: Pt reports that dry needling is helpful. Since last appt, she has been into ED for double vision and migraine headaches, lightheadedness and dizziness. She has an order for an MRI and will see eye doctor next Tuesday and the following week, she will see neurologist and then the rheumatologist.  3/31/21 Pt reports increased c/o neck and headache pain for the last few days. 4/5/21: Pt reports that she has headaches every day . She reached out to Dr. Sona Cloud this morning. The dry needling helped the other day. She is still having the nerve pain and weakness in both arms. 4/7/21: \"I had a horrible day yesterday with headache. I saw a migraine neurologist, Dr. Jeferson Aponte, at [facility] yesterday and she discussed as regiment of meds over the next 6 mos to one year. Pt will also see eye doctor next week and rheumatologist later on. \" Her worst pain is in a \"knot\" on left upper cervical area. OBJECTIVE: 3/5/21   Observation:    Test measurements:  Cervical flexion: 9 with pain  Extension: 3 with  pain   SB L:  21  SB R:  18  ROT L:  30   ROT R:  32                                        UE ROM:  Flexion:   130 bilaterally     Abd:  Left 120   R:  150   Int rot:   60 bilaterally                                        Strength: All major muscle groups of UE test as 3/5 with some tremoring upon resistance. Sensation:  Same only on both forearms. Oterwise, there are differences in sensation on all other dermatomes.                                          Palpation:  Still very tender and significantly tight    RESTRICTIONS/PRECAUTIONS:   Exercises/Interventions:   Therapeutic Ex (04519)  Min: Resistance/Repetitions Notes   Dorsal glilisa      UE ranger for scap mobs 20X on left    To help with LE spasms   To help with LE spasms   OH pulley 3 min2/24/21   Seated cerv. Rotation     Seated cerv. ext    Supine towel roll tucks     Seated chin tucks  Post DN   Bicep curls    Shoulder flexion    Scaption    Seated cerv. UT stretch  30\" x 2 L/R ea    Scapula elevation and retraction  Retraction 20 x   Elevation with  forward and backward rotation 20 x ea Post DN   Manual Intervention (19889)  Min: 25     Cerv mobs/manip OA, AA flexion  Grade 3, cerv. Distraction grade 4  And gross  cerv. Retraction MET for cervical rotation   Thoracic mobs/manip     CT manip     Rib mobilizations     STM bilat dorsal cerv. pvm's 10 min         NMR re-education (91568)  Min:     Theracane         Therapeutic Activity (33509)  Min: 10     Discussed posture and gave brochure on spinal care and safety of wrokstation          Modalities  Min:      [id number] min at 1.6 W/ccm2 while right sidelying   E stim    Has this at home   Int Cervical traction with MHP 15# x 15 min  Good response 4/7/21       Other Therapeutic Activities:  Pt was educated on PT POC, Diagnosis, Prognosis, pathomechanics as well as frequency and duration of scheduling future physical therapy appointments. Time was also taken on this day to answer all patient questions and participation in PT. Reviewed appointment policy in detail with patient and patient verbalized understanding. 2/8/21: Pt felt well with theracane trial and PT wrote the name for her to look into for possible purchase, if pt so desires. 2/11/21: Zulema De La Vega, PT spoke with pt and educated her about dry needling as a possible treatment. This therapist called Dr. Jenni Nugent about this treatment and asked registrar to inquire about insurance coverage. 2/19/21: Gave pt further information to consider regarding dry needling treatment. 3/5/21: Pt and PT reviewed NDI and goals and discussed her current condition and changes with PT at length. Reviewed proper body mechanics principles and proper posture. Gave brochures on posture and body mechanics. 3/3/21: Lengthy discussion with patient to update therapist on her condition and insurance allowance. Also gave information about and discussed Gentile home cervical traction device. Home Exercise Program: [address] access code [email or web address] given today. Therapeutic Exercise and NMR EXR  [x] (54210) Provided verbal/tactile cueing for activities related to strengthening, flexibility, endurance, ROM  for improvements in cervical, postural, scapular, scapulothoracic and UE control with self care, reaching, carrying, lifting, house/yardwork, driving/computer work.    [] (71485) Provided verbal/tactile cueing for activities related to improving balance, coordination, kinesthetic sense, posture, motor skill, proprioception  to assist with cervical, scapular, scapulothoracic and UE control with self care, reaching, carrying, lifting, house/yardwork, driving/computer work. Therapeutic Activities:    [x] (34456 or 80776) Provided verbal/tactile cueing for activities related to improving balance, coordination, kinesthetic sense, posture, motor skill, proprioception and motor activation to allow for proper function of cervical, scapular, scapulothoracic and UE control with self care, carrying, lifting, driving/computer work.      Home Exercise Program:    [x] (24963) Reviewed/Progressed HEP activities related to strengthening, flexibility, endurance, ROM of cervical, scapular, scapulothoracic and UE control with self care, reaching, carrying, lifting, house/yardwork, driving/computer work  [] (51054) Reviewed/Progressed HEP activities related to improving balance, coordination, kinesthetic sense, posture, motor skill, proprioception of cervical, scapular, scapulothoracic and UE control with self care, reaching, carrying, lifting, house/yardwork, driving/computer work      Manual Treatments:  PROM / STM / Oscillations-Mobs:  G-I, II, III, IV (PA's, Inf., Post.)  [x] (58723) Provided manual therapy to mobilize soft tissue/joints of cervical/CT, scapular GHJ and UE for the purpose of decreasing headache, modulating pain, promoting relaxation,  increasing ROM, reducing/eliminating soft tissue swelling/inflammation/restriction, improving soft tissue extensibility and allowing for proper ROM for normal function with self care, reaching, carrying, lifting, house/yardwork, driving/computer work      Charges:  Timed Code Treatment Minutes: 45   Total Treatment Minutes: 65     [] EVAL (LOW) 34575 (typically 20 minutes face-to-face)  [] EVAL (MOD) 76582 (typically 30 minutes face-to-face)  [] EVAL (HIGH) 51358 (typically 45 minutes face-to-face)  [] RE-EVAL     [] XY(87738) x      [] Dry needle 1 or 2 Muscles (46626)  [] NMR (88475) x     [] Dry needle 3+ Muscles (07116)  [x] Manual (77388) x 2   [x] Ultrasound (86340) x 10 min  [] TA (70939) x 2  [x] Mech Traction (17508) 15 min  [] ES(attended) (83561)     [] ES (un) (69035): 15 min  [] Vasopump (96495) [] Ionto (50354)   [] Other:Dry Needling    GOALS:Patient stated goal: \"Build muscle strength and relax muscle tension and range of motion\"  []? Progressing: []? Met: [x]? Not Met: []? Adjusted     Therapist goals for Patient:   Short Term Goals: To be achieved in: 2 weeks  1. Independent in HEP and progression per patient tolerance, in order to prevent re-injury. []? Progressing: [x]? Met: []? Not Met: []? Adjusted  2. Patient will have a decrease in pain to facilitate improvement in movement, function, and ADLs as indicated by Functional Deficits. (to be achieved in 6 weeks)  []? Progressing: []? Met: [x]? Not Met: []? Adjusted     Long Term Goals: To be achieved in:8 weeks  1. Disability index score of 40% or less for the NDI to assist with reaching prior level of function. []? Progressing: []? Met: [x]? Not Met: []? Adjusted  2.  Patient will demonstrate increased AROM to [facility] of cervical/thoracic spine to allow for proper joint functioning as indicated by patients Functional Deficits. []? Progressing: []? Met: [x]? Not Met: []? Adjusted  3. Patient will demonstrate an increase in postural awareness and control and activation of  Deep cervical stabilizers to allow for proper functional mobility as indicated by patients Functional Deficits. []? Progressing: [x]? Met: []? Not Met: []? Adjusted  4. Patient will return to 60%functional activities without increased symptoms or restriction. []? Progressing: []? Met: [x]? Not Met: []? Adjusted  5. Pt will be able to rotate neck 10 degrees further.(patient specific functional goal)    []? Progressing: []? Met: [x]? Not Met: []? Adjusted               ASSESSMENT:  Patient has pain changes by the day. There has not been a consistent improvement in her condition with PT. Her cervical spinal ROM is actually less today than on initial eval and strength is about the same, but tremoring was noted upon resistance. Of concern is the paresthesia on both arms. She continues to have significant tightness on cervical spine, but feels as if dry needling was helpful. Treatment/Activity Tolerance:  [x] Patient tolerated treatment well [] Patient limited by fatique  [] Patient limited by pain  [] Patient limited by other medical complications  [] Other:     Overall Progression Towards Functional goals/ Treatment Progress Update:  [] Patient is progressing as expected towards functional goals listed. [] Progression is slowed due to complexities/Impairments listed. [x] Progression has been slowed due to co-morbidities.   [] Plan just implemented, too soon to assess goals progression <30days   [] Goals require adjustment due to lack of progress  [] Patient is not progressing as expected and requires additional follow up with physician  [] Other    Prognosis for POC: [x] Good [] Fair  [] Poor    Patient requires continued skilled intervention: [x] Yes  [] No        PLAN:  Pt will continue with 2-3 x times weekly for 4-5 more weeks as needed, alternating

## 2021-04-12 ENCOUNTER — HOSPITAL ENCOUNTER (OUTPATIENT)
Dept: PHYSICAL THERAPY | Age: 41
Setting detail: THERAPIES SERIES
Discharge: HOME | End: 2021-04-12
Payer: COMMERCIAL

## 2021-04-12 PROCEDURE — 20561 NDL INSJ W/O NJX 3+ MUSC: CPT

## 2021-04-12 PROCEDURE — 97110 THERAPEUTIC EXERCISES: CPT

## 2021-04-12 PROCEDURE — 97140 MANUAL THERAPY 1/> REGIONS: CPT

## 2021-04-12 NOTE — FLOWSHEET NOTE
Headaches are less, but spasms in LE's are worse at nights and weakness in both arms is worse. 02/05/21; Patient reports H/A are not as bad,sore on left side of the neck,spasms and weakness in her arms are about the same. 2/8/21: Pt reports that she was ok through most of the day on Saturday and around 4:00 PM she had increase pain on left side of head, headaches and arm pain and soreness too. She took a muscle relaxer last night, and she slept well last night but when she took Gabapentin and muscle relaxer on Saturday it made no difference. She lifted a few things between 12:00 and 3:00 on Saturday. (This may be the activity that caused increase of pain.)  2/11/21: Pt reports that she has tried heat, TENS unit and ice, rest and taking Neurontin. She started the  Prednisone yesterday afternoon. She has been worse, but is a little better today. 02/15/21: Patient reports she has been feeling a little better since she started taking Prednisone. States her H/A and tingling down her arms has been about the same. 2/19/21: Pt reports that her neck  and lumbar pain switch back between 4-5/10. She has completed predinsone and it is not too bad.  2/22/21: \"I had a really good weekend and pain decreased to 3/10, but I woke up sore this morning. I have been a lot more tired lately. I have slept better. \"  2/24/21: Pt reports that she has nerve pain in both arms with burning and tingling. Her \"neck is not that bad, she states\". She spent time in front of computer yesterday, but was not very physical. The UE still fatigue very  easlily. 3/1/21 Pt reports having had bad headaches Saturday but not as bad today. 3/3/21 Pt reports decreased pain for a few hours post last Rx but did experience some nausea latter that same evening. Does not have nausea at this time. She expressed interest in continuing dry needling today.    3/5/21: Pt reports that the Wednesday session of dry needling was helpful and felt longer relief and had a good day yesterday. She was active yesterday too. Today, pain is worse again. 3/15/21 Pt reports some relief from the dry needling.  3/24/21: Pt reports that dry needling is helpful. Since last appt, she has been into ED for double vision and migraine headaches, lightheadedness and dizziness. She has an order for an MRI and will see eye doctor next Tuesday and the following week, she will see neurologist and then the rheumatologist.  3/31/21 Pt reports increased c/o neck and headache pain for the last few days. 4/5/21: Pt reports that she has headaches every day . She reached out to Dr. Willian Williamson this morning. The dry needling helped the other day. She is still having the nerve pain and weakness in both arms. 4/7/21: \"I had a horrible day yesterday with headache. I saw a migraine neurologist, Dr. Dayday Reyes, at [facility] yesterday and she discussed as regiment of meds over the next 6 mos to one year. Pt will also see eye doctor next week and rheumatologist later on. \" Her worst pain is in a \"knot\" on left upper cervical area. 4/12/21 Pt reports a decrease in frequency of days with severe head aches. OBJECTIVE: 3/5/21   Observation:    Test measurements:  Cervical flexion: 9 with pain  Extension: 3 with  pain   SB L:  21  SB R:  18  ROT L:  30   ROT R:  32                                        UE ROM:  Flexion:   130 bilaterally     Abd:  Left 120   R:  150   Int rot:   60 bilaterally                                        Strength: All major muscle groups of UE test as 3/5 with some tremoring upon resistance. Sensation:  Same only on both forearms. Oterwise, there are differences in sensation on all other dermatomes.                                          Palpation:  Still very tender and significantly tight    RESTRICTIONS/PRECAUTIONS:   Exercises/Interventions:   Therapeutic Ex (88899)  Min: Resistance/Repetitions Notes   Dorsal glides      UE ranger for scap mobs     To help with LE spasms   To help with LE spasms   OH pulley 2/24/21   Seated cerv. Rotation     Seated cerv. ext 5 x 2    Supine towel roll tucks  15 x 2   Supine dnf 30\" x 3   Seated chin tucks  Post DN   Bicep curls    Shoulder flexion    Scaption    Seated cerv. Levator scap. stretch 30\" x 2 L/R ea    UT stretch  L/R 30\" x 2 L/R ea   Scapula elevation and retraction  Post DN   Manual Intervention (88122)  Min: 25     Cerv mobs/manip  rotation   Thoracic mobs/manip     CT manip     Rib mobilizations     STM bilat dorsal cerv. And Thor. pvm's 10 min         NMR re-education (30831)  Min:     Theracane         Therapeutic Activity (58921)  Min: 10     Discussed posture and gave brochure on spinal care and safety of wrokstation          Modalities  Min:      [id number] min at 1.6 W/ccm2 while right sidelying   E stim    Has this at home   Int Cervical traction with MHP 15# x 15 min  Good response 4/7/21       Dry needling manual therapy: consisted on the placement of 6 needles in the following muscles:  Splenius capitus L/R, Semispinalis capitus , cervical multifidus C6-7 L/R  And left upper trap. .  A 40 and 60  mm needle was inserted, piston, rotated, and coned to produce intramuscular mobilization. These techniques were used to restore functional range of motion, reduce muscle spasm and induce healing in the corresponding musculature. (97856)  Clean Technique was utilized today while applying Dry needling treatment. The treatment sites where cleaned with 70% solution of  isopropyl alcohol . The PT washed their hands and utilized treatment gloves along with hand  prior to inserting the needles. All needles where removed and discarded in the appropriate sharps container. MD has given verbal and/or written approval for this treatment.      Attended low frequency (2-3Hz) electrical stimulation was utilized in conjunction with Dry Needling:  the Estim was manipulated between all above needles for a period of 10 min. at 9 volts. The low frequency electrstimulation was used to help reduce muscle spasm and help to interrupt /[place] the pain cycle. (36136) Other Therapeutic Activities:  Pt was educated on PT POC, Diagnosis, Prognosis, pathomechanics as well as frequency and duration of scheduling future physical therapy appointments. Time was also taken on this day to answer all patient questions and participation in PT. Reviewed appointment policy in detail with patient and patient verbalized understanding. 21: Pt felt well with theracane trial and PT wrote the name for her to look into for possible purchase, if pt so desires. 21: Archie Oliveira, PT spoke with pt and educated her about dry needling as a possible treatment. This therapist called Dr. Emma Apgar about this treatment and asked registrar to inquire about insurance coverage. 21: Gave pt further information to consider regarding dry needling treatment. 3/5/21: Pt and PT reviewed NDI and goals and discussed her current condition and changes with PT at length. Reviewed proper body mechanics principles and proper posture. Gave brochures on posture and body mechanics. 3/3/21: Lengthy discussion with patient to update therapist on her condition and insurance allowance. Also gave information about and discussed Gentile home cervical traction device. Home Exercise Program: [facility] access code [id number] given today.     Therapeutic Exercise and NMR EXR  [x] (04254) Provided verbal/tactile cueing for activities related to strengthening, flexibility, endurance, ROM  for improvements in cervical, postural, scapular, scapulothoracic and UE control with self care, reaching, carrying, lifting, house/yardwork, driving/computer work.    [] (04344) Provided verbal/tactile cueing for activities related to improving balance, coordination, kinesthetic sense, posture, motor skill, proprioception  to assist with cervical, scapular, scapulothoracic and UE control with self care, reaching, carrying, lifting, house/yardwork, driving/computer work. Therapeutic Activities:    [x] (57435 or 28312) Provided verbal/tactile cueing for activities related to improving balance, coordination, kinesthetic sense, posture, motor skill, proprioception and motor activation to allow for proper function of cervical, scapular, scapulothoracic and UE control with self care, carrying, lifting, driving/computer work.      Home Exercise Program:    [x] (95816) Reviewed/Progressed HEP activities related to strengthening, flexibility, endurance, ROM of cervical, scapular, scapulothoracic and UE control with self care, reaching, carrying, lifting, house/yardwork, driving/computer work  [] (09123) Reviewed/Progressed HEP activities related to improving balance, coordination, kinesthetic sense, posture, motor skill, proprioception of cervical, scapular, scapulothoracic and UE control with self care, reaching, carrying, lifting, house/yardwork, driving/computer work      Manual Treatments:  PROM / STM / Oscillations-Mobs:  G-I, II, III, IV (PA's, Inf., Post.)  [x] (85486) Provided manual therapy to mobilize soft tissue/joints of cervical/CT, scapular GHJ and UE for the purpose of decreasing headache, modulating pain, promoting relaxation,  increasing ROM, reducing/eliminating soft tissue swelling/inflammation/restriction, improving soft tissue extensibility and allowing for proper ROM for normal function with self care, reaching, carrying, lifting, house/yardwork, driving/computer work      Charges:  Timed Code Treatment Minutes: 45   Total Treatment Minutes: 65     [] EVAL (LOW) 72659 (typically 20 minutes face-to-face)  [] EVAL (MOD) 32098 (typically 30 minutes face-to-face)  [] EVAL (HIGH) 77225 (typically 45 minutes face-to-face)  [] RE-EVAL     [] OZ(30528) x      [] Dry needle 1 or 2 Muscles (34140)  [] NMR (45811) x     [] Dry needle 3+ Muscles (35541)  [x] Manual (01090) x 2   [x] Ultrasound (20448) x 10 min  [] TA (79119) x 2  [x] Mech Traction (01993) 15 min  [] ES(attended) (35270)     [] ES (un) (37473): 15 min  [] Vasopump (42815) [] Ionto (87409)   [] Other:Dry Needling    GOALS:Patient stated goal: \"Build muscle strength and relax muscle tension and range of motion\"  []? Progressing: []? Met: [x]? Not Met: []? Adjusted     Therapist goals for Patient:   Short Term Goals: To be achieved in: 2 weeks  1. Independent in HEP and progression per patient tolerance, in order to prevent re-injury. []? Progressing: [x]? Met: []? Not Met: []? Adjusted  2. Patient will have a decrease in pain to facilitate improvement in movement, function, and ADLs as indicated by Functional Deficits. (to be achieved in 6 weeks)  []? Progressing: []? Met: [x]? Not Met: []? Adjusted     Long Term Goals: To be achieved in:8 weeks  1. Disability index score of 40% or less for the NDI to assist with reaching prior level of function. []? Progressing: []? Met: [x]? Not Met: []? Adjusted  2. Patient will demonstrate increased AROM to [facility] of cervical/thoracic spine to allow for proper joint functioning as indicated by patients Functional Deficits. []? Progressing: []? Met: [x]? Not Met: []? Adjusted  3. Patient will demonstrate an increase in postural awareness and control and activation of  Deep cervical stabilizers to allow for proper functional mobility as indicated by patients Functional Deficits. []? Progressing: [x]? Met: []? Not Met: []? Adjusted  4. Patient will return to 60%functional activities without increased symptoms or restriction. []? Progressing: []? Met: [x]? Not Met: []? Adjusted  5. Pt will be able to rotate neck 10 degrees further.(patient specific functional goal)    []? Progressing: []? Met: [x]? Not Met: []? Adjusted               ASSESSMENT:  Patient has pain changes by the day. There has not been a consistent improvement in her condition with PT.  Her cervical spinal ROM is actually less today than on

## 2021-04-14 ENCOUNTER — HOSPITAL ENCOUNTER (OUTPATIENT)
Dept: PHYSICAL THERAPY | Age: 41
Setting detail: THERAPIES SERIES
Discharge: HOME | End: 2021-04-14
Payer: COMMERCIAL

## 2021-04-14 NOTE — PROGRESS NOTES
4/15/2021  Patient Name: Guevara Victor  : 1980  Medical Record: [id number]      ASSESSMENT AND PLAN    Assessment/Plan:      ASSESSMENT:    1. Muscle pain    2. Multiple joint pain    3. Bilateral occipital neuralgia    4. Other migraine without status migrainosus, not intractable    5. DDD (degenerative disc disease), cervical    6. DDD (degenerative disc disease), lumbar        PLAN:     Maryjane Solorzano was seen today for establish care. Diagnoses and all orders for this visit:    Muscle pain  -     CK; Future  -     TSH WITH REFLEX TO FT4; Future  -     Vitamin B12; Future  -     Vitamin D 25 Hydroxy; Future  -     Electrophoresis Protein, Serum without Reflex to Immunofixation; Future    Multiple joint pain  -     C-Reactive Protein; Future  -     Sedimentation Rate; Future  -     Rheumatoid Factor; Future  -     Cyclic Citrul Peptide Antibody, IgG; Future  -     Hepatitis B Core Antibody, IgM; Future  -     Hepatitis B Surface Antigen; Future  -     Hepatitis C Antibody; Future  -     Miscellaneous Sendout 1; Future  -     XR HAND RIGHT (MIN 3 VIEWS); Future  -     XR HAND LEFT (MIN 3 VIEWS); Future  -     Electrophoresis Protein, Serum without Reflex to Immunofixation; Future    Bilateral occipital neuralgia    Other migraine without status migrainosus, not intractable    DDD (degenerative disc disease), cervical    DDD (degenerative disc disease), lumbar    Constellation of nonspecific symptoms do not fit into single diagnosis. She has widespread musculoskeletal pain 18/18 tender points, fatigue, brain fog/memory changes, poor sleep  Most likely she has fibromyalgia/chronic pain syndrome/myofascial pain  Doubt autoimmune disease or systemic rheumatic disease. I will do further work-up to rule out any underlying metabolic, myopathy, autoimmune etiology    Joint symptoms most likely due to fibromyalgia/early osteo-arthritis.   I did not appreciate any synovitis on joint exam.  Doubt rheumatoid arthritis or systemic rheumatic disease     Chronic migraine headaches/occipital neuralgia-followed by neurology/PMR    Depending on the results I will arrange a follow-up appointment. If work-up is unremarkable I would recommend multidisciplinary approach including referral to a pain specialist and PMR for management of her symptoms    The patient indicates understanding of these issues and agrees with the plan. Return if symptoms worsen or fail to improve. The risks and benefits of my recommendations, as well as other treatment options, benefits and side effects werediscussed with the patient. All questions were answered. I reviewed patient's history, referral documents and electronic medical records  Copy of consult note is being routedelectronically/faxed to referring physician         MEDICATIONS  Current Outpatient Medications   Medication Sig Dispense Refill    topiramate (TOPAMAX) 25 MG tablet 1 tab by mouth at bedtime x 2weeks if tolerating 2tabs at bedtime      Potassium 99 MG TABS Take by mouth daily      Magnesium 500 MG CAPS Take by mouth daily      Ascorbic Acid (VITAMIN C) 500 MG CAPS Take by mouth daily      Nutritional Supplements (HIGH-PROTEIN NUTRITIONAL SHAKE PO) Take by mouth [employer] shake daily.  verapamil (CALAN SR) 180 MG extended release tablet Take 1 tablet by mouth daily 90 tablet 1    gabapentin (NEURONTIN) 300 MG capsule Take 1 capsule by mouth 3 times daily as needed (nerve pain) for up to 90 days.  90 capsule 2    naproxen (NAPROSYN) 500 MG tablet Take 1 tablet by mouth 2 times daily (with meals) 60 tablet 2    omeprazole (PRILOSEC) 20 MG delayed release capsule Take 1 capsule by mouth daily 30 capsule 0    albuterol sulfate HFA (VENTOLIN HFA) 108 (90 Base) MCG/ACT inhaler Inhale 2 puffs into the lungs 4 times daily as needed for Wheezing 1 Inhaler 5    tiZANidine (ZANAFLEX) 2 MG tablet Take 1 tablet by mouth nightly as needed (neck pain) 10 tablet 0    docusate sodium (COLACE) 100 MG capsule Take 100 mg by mouth 2 times daily as needed       senna (SENOKOT) 8.6 MG tablet Take 1 tablet by mouth daily      melatonin 3 MG TABS tablet Take 3 mg by mouth daily      acetaminophen (APAP EXTRA STRENGTH) 500 MG tablet Take 2 tablets by mouth every 6 hours as needed for Pain DO NOT TAKE WITH OTHER MEDICATIONS CONTAINING ACETAMINOPHEN. 30 tablet 0     No current facility-administered medications for this visit. ALLERGIES  Allergies   Allergen Reactions    Bee Venom     Fentanyl      Patches - adhesive area itching    Penicillins          Comments  No specialty comments available. REFERRING PHYSICIAN:Tonny Coronado MD    HISTORY OF PRESENT ILLNESS  Jen Cervantes is a 38 y.o. female with past medical history of bipolar disorder, anxiety and depression degenerative disc disease in the lumbar spine and cervical spine who is being seen for follow up evaluation of  joint and muscle pain. She reports pain in her elbows, wrists and hands for past several years but are getting worse over the past 6 months. She has been on daily basis. She has swelling in her whole hands and she has stiffness in her whole body in the morning lasting for few hours. Her sister has rheumatoid arthritis. She denies psoriasis, inflammatory bowel disease, inflammatory back pain, dactylitis, enthesitis, tenosynovitis or uveitis. She takes naproxen 500 mg twice a day with minimal relief. She also reports pain in her muscles. She has widespread musculoskeletal pain involving upper and lower body on both sides of the midline. Pain is worse in her arms. She has been on daily basis. Pain gets worse with physical activity and stress. She is dealing with a lot of stress over the past 1 year. She wakes up frequently at night due to pain. She has fatigue. She also has brain fog and memory changes. She has anxiety and depression and bipolar disorder.     She has chronic neck and low back pain. She has degenerative disc disease in the lumbar and cervical spine. She has occipital neuralgia years and migraine headaches. She is seeing a migraine specialist and was placed on Topamax recently. She also sees PMR specialist for her occipital neuralgia . She was given trigger point injections. She was referred to physical therapy and for dry needling. She has chronic low back pain secondary to degenerative disc disease in the lumbar spine. She has had surgery for herniated disc without any significant improvement. She also has constellation of multiple symptoms. She has bowel issues, dental issues and GYN issues. She had COVID-19 infection in October 2020 and since then she felt worsening migraine headaches, cold flulike symptoms and acid reflux. She has been seen by multiple specialists over the past several months. She has intermittent rash, easy bruising, intermittent oral ulcers, watery eyes, history of 1 miscarriage in first trimester, Raynaud's in feet, hair loss. She denies malar rash, photosensitivity, nasal ulcers, dry eyes or dry mouth, blood clots, pleurisy or pericarditis, kidney diseases, sclerodactyly, skin thickening. sHPI  Review of Systems    REVIEW OF SYSTEMS: As in HPI  Constitutional: No unanticipated weight loss or fevers. Integumentary: No photosensitivity, malar rash, livedo reticularis, sclerodactyly, skin tightening  Eyes: negative for visual disturbance and persistent redness  ENT: - No tinnitus, loss of hearing, vertigo, or recurrent ear infections.  - No history of nasal/oral ulcers. Cardiovascular: No history of pericarditis, chest pain or murmur or palpitations  Respiratory: No shortness of breath, cough or history of interstitial lung disease. No history of pleurisy. No history of tuberculosis or atypical infections. Gastrointestinal: No history of heart burn, dysphagia or esophageal dysmotility. No inflammatory bowel disease.   Genitourinary: No history renal disease  Hematologic/Lymphatic: No abnormal bruising or bleeding, blood clots or swollen lymph nodes. Neurological: No history of  seizure or focal weakness. No history of neuropathies,  hyperesthesias, facial droop, diplopia  Endocrine: Denies any polyuria, polydipsia and osteoporosis  Allergic/Immunologic: No nasal congestion or hives. I have reviewed patients Past medical History, Social History and Family History as mentioned in her chart and this remains unchanged fromprevious.     Past Medical History:   Diagnosis Date    Bipolar disorder ([facility] [address]     Pt was dx age 14    HPV in female     Medical history reviewed with no changes     Ovarian cyst     Ovarian cyst rupture      Past Surgical History:   Procedure Laterality Date    BACK SURGERY  06/2013    LUMBAR FUSION    CERVIX SURGERY      biopsy    PAIN MANAGEMENT PROCEDURE N/A 2/18/2020    CERVICAL SIX SEVEN INTERLAMINAR EPIDURAL STEROID INJECTION SITE CONFIRMED BY FLUOROSCOPY performed by Renetta Scott MD at [address] [place] [address]  2013    L5 S1    TUBAL LIGATION  11/2010     Social History     Socioeconomic History    Marital status:      Spouse name: Not on file    Number of children: Not on file    Years of education: Not on file    Highest education level: Not on file   Occupational History    Not on file   Social Needs    Financial resource strain: Not hard at all   "[employer] insecurity     Worry: Never true     Inability: Never true   [employer] needs     Medical: Not on file     Non-medical: Not on file   Tobacco Use    Smoking status: Current Every Day Smoker     Packs/day: 1.00    Smokeless tobacco: Never Used    Tobacco comment: pt smokes on average half a pack to one pack a day    Substance and Sexual Activity    Alcohol use: Not Currently     Comment: very rare    Drug use: Never    Sexual activity: Not Currently   Lifestyle    Physical activity     Days per week: Not on file Minutes per session: Not on file    Stress: Not on file   Relationships    Social connections     Talks on phone: Not on file     Gets together: Not on file     Attends [employer] service: Not on file     Active member of club or organization: Not on file     Attends meetings of clubs or organizations: Not on file     Relationship status: Not on file    Intimate partner violence     Fear of current or ex partner: Not on file     Emotionally abused: Not on file     Physically abused: Not on file     Forced sexual activity: Not on file   Other Topics Concern    Not on file   Social History Narrative    Recently moved from [place] [address] her to be with her father, Bahman Negrete (patient here) to care for him with hospice due to his cancer    - taking the summer off to care for her father, and she will consider starting back up after Hospice    - has 3 children ages 25, 8, and 6     Family History   Problem Relation Age of Onset    Asthma Father     Other Father     Cancer Maternal Aunt         ovarian and cervical     Asthma Maternal Aunt     Hyperthyroidism Maternal Aunt     Cancer Maternal Uncle     Prostate Cancer Paternal Uncle     Breast Cancer Maternal Grandmother     Diabetes Maternal Grandfather     High Blood Pressure Maternal Grandfather     Prostate Cancer Paternal Grandfather          PHYSICAL EXAM   Vitals:    04/15/21 1039   BP: 130/82   Site: Left Upper Arm   Position: Sitting   Cuff Size: Medium Adult   Pulse: 76   Weight: 142 lb (64.4 kg)   Height: 5' 6.3\" (1.684 m)     Physical Exam  Constitutional:  Well developed, well nourished, no acute distress, non-toxic appearance   Musculoskeletal:    RIGHT  Swell  Tender  ROM  LEFT  Swell  Tender  ROM    DIP2  0  0  FULL   0  0  FULL    DIP3  0  0  FULL   0  0  FULL    DIP4  0  0  FULL   0  0  FULL    DIP5  0  0  FULL   0  0  FULL    PIP1  0  0  FULL   0  0  FULL    PIP2  0  0  FULL   0  0  FULL    PIP3  0  0  FULL   0  0  FULL    PIP4  0 0  FULL   0  0  FULL    PIP5  0  0  FULL   0  0  FULL    MCP1  0  0  FULL   0  0  FULL    MCP2  0  0  FULL   0  0  FULL    MCP3  0  0  FULL   0  0  FULL    MCP4  0  0  FULL   0  0  FULL    MCP5  0  0  FULL   0  0  FULL    Wrist  0  0  FULL   0  0  FULL    Elbow  0  0  FULL   0  0  FULL    Shouldr  0  0  FULL   0  0  FULL    Hip  0  0  FULL   0  0  FULL    Knee  0  0   crepitus  0  0   crepitus   Ankle  0  0  FULL   0  0  FULL    MTP1  0  0  FULL   0  0  FULL    MTP2  0  0  FULL   0  0  FULL    MTP3  0  0  FULL   0  0  FULL    MTP4  0  0  FULL   0  0  FULL    MTP5  0  0  FULL   0  0  FULL    IP1  0  0  FULL   0  0  FULL    IP2  0  0  FULL   0  0  FULL    IP3  0  0  FULL   0  0  FULL    IP4  0  0  FULL   0  0  FULL    IP5  0  0  FULL   0 0 FULL                                             Right            Left                                   Tender Tender    Costochondral  ++ ++   Low cervical ++ ++   suboccipital ++ ++   Trapezius  ++ ++   Supraspinatus  ++ ++   Lateral epicondyl ++ ++   Gluteal ++ ++   Greater trochanter  ++ ++   knees + +     Ambulates without assistance, normal gait  Neck: Diffuse tenderness  Back-diffuse lumbar spinal and paraspinal tenderness  Eyes:  PERRL, extra ocular movements intact, conjunctiva normal   HEENT:  Atraumatic, normocephalic, external ears normal, oropharynx moist, no pharyngeal exudates. Respiratory:  No respiratory distress  GI:  Soft, nondistended, normal bowel sounds, nontender, noorganomegaly, no mass, no rebound, no guarding   :  No costovertebral angle tenderness   Integument:  Well hydrated, no rash or telangiectasias  Lymphatic:  No lymphadenopathy noted   Neurologic:   Alert & oriented x 3, CN 2-12 normal, no focal deficits noted. Sensations Intact. Muscle strength 5/5 proximallyand distally in upper and lower extremities.    Psychiatric:  Speech and behavior appropriate           LABS AND IMAGING  Outside data reviewed and in HPI    Lab Results   Component Value Date    WBC 9.3 03/16/2021    RBC 4.50 03/16/2021    HGB 14.0 03/16/2021    HCT 40.3 03/16/2021     03/16/2021    MCV 89.5 03/16/2021    MCH 31.1 03/16/2021    MCHC 34.8 03/16/2021    RDW 13.5 03/16/2021    LYMPHOPCT 18.6 03/16/2021    MONOPCT 5.3 03/16/2021    BASOPCT 0.9 03/16/2021    MONOSABS 0.5 03/16/2021    LYMPHSABS 1.7 03/16/2021    EOSABS 0.1 03/16/2021    BASOSABS 0.1 03/16/2021       Chemistry        Component Value Date/Time     03/16/2021 1653    K 3.8 03/16/2021 1653     03/16/2021 1653    CO2 26 03/16/2021 1653    BUN 13 03/16/2021 1653    CREATININE 0.6 03/16/2021 1653        Component Value Date/Time    CALCIUM 9.5 03/16/2021 1653    ALKPHOS 48 03/16/2021 1653    AST 15 03/16/2021 1653    ALT 11 03/16/2021 1653    BILITOT 0.5 03/16/2021 1653          Lab Results   Component Value Date    SEDRATE 3 08/01/2019     Lab Results   Component Value Date    CRP <0.3 08/01/2019     No results found for: MARYAN, FILIBERTO, SSA, SSB, C3, C4  No results found for: RF, CCPABIGG  No results found for: MARYAN, ANATITER, ANAINT, PATH  No results found for: DSDNAG, DSDNAIGGIFA  No results found for: SSAROAB, SSALAAB  No results found for: SMAB, RNPAB  No results found for: CENTABIGG  No results found for: C3, C4, ACE  No results found for: JO1, VITD25, [id number]  No results found for: Daylene Porter  No results found for: Ashvin Chacon  Lab Results   Component Value Date    TSH 0.45 06/14/2019     No results found for: VITD25    ######################################################################    I thank you for giving me theopportunity to participate in [facility] care. If you have any questions or concerns please feel free to contact me. I look forward to following  PARMJIT MCKINNEY [facility] along with you. Electronically signed by: Becka Garcia MD, 4/15/2021 11:10 AM    Documentation was done using voice recognition dragon software.   Every effort was made to ensure accuracy;however, inadvertent unintentional computerized transcription errors may be present.

## 2021-04-14 NOTE — FLOWSHEET NOTE
Physical Therapy  Cancellation/No-show Note  Patient Name:  Radu Jade  :  1980   Date:  2021  Cancelled visits to date: 1  No-shows to date: 0    For today's appointment patient:  [x]  Cancelled  []  Rescheduled appointment  []  No-show     Reason given by patient:  []  Patient ill  []  Conflicting appointment  []  No transportation    []  Conflict with work  [x]  No reason given  []  Other:     Comments:      Electronically signed by:  Diane Flores PT

## 2021-04-15 ENCOUNTER — OFFICE VISIT (OUTPATIENT)
Dept: RHEUMATOLOGY | Age: 41
End: 2021-04-15
Payer: COMMERCIAL

## 2021-04-15 VITALS
WEIGHT: 142 LBS | DIASTOLIC BLOOD PRESSURE: 82 MMHG | BODY MASS INDEX: 22.82 KG/M2 | HEIGHT: 66 IN | SYSTOLIC BLOOD PRESSURE: 130 MMHG | HEART RATE: 76 BPM

## 2021-04-15 DIAGNOSIS — M79.10 MUSCLE PAIN: Primary | ICD-10-CM

## 2021-04-15 DIAGNOSIS — M54.81 BILATERAL OCCIPITAL NEURALGIA: ICD-10-CM

## 2021-04-15 DIAGNOSIS — M51.36 DDD (DEGENERATIVE DISC DISEASE), LUMBAR: ICD-10-CM

## 2021-04-15 DIAGNOSIS — M50.30 DDD (DEGENERATIVE DISC DISEASE), CERVICAL: ICD-10-CM

## 2021-04-15 DIAGNOSIS — M25.50 MULTIPLE JOINT PAIN: ICD-10-CM

## 2021-04-15 DIAGNOSIS — G43.809 OTHER MIGRAINE WITHOUT STATUS MIGRAINOSUS, NOT INTRACTABLE: ICD-10-CM

## 2021-04-15 PROCEDURE — G8427 DOCREV CUR MEDS BY ELIG CLIN: HCPCS | Performed by: INTERNAL MEDICINE

## 2021-04-15 PROCEDURE — 99244 OFF/OP CNSLTJ NEW/EST MOD 40: CPT | Performed by: INTERNAL MEDICINE

## 2021-04-15 PROCEDURE — G8420 CALC BMI NORM PARAMETERS: HCPCS | Performed by: INTERNAL MEDICINE

## 2021-04-15 RX ORDER — MULTIVIT-MIN/IRON/FOLIC ACID/K 18-600-40
CAPSULE ORAL DAILY
COMMUNITY

## 2021-04-15 RX ORDER — FOLIC ACID 0.8 MG
TABLET ORAL DAILY
COMMUNITY

## 2021-04-15 RX ORDER — TOPIRAMATE 25 MG/1
TABLET ORAL
COMMUNITY
Start: 2021-04-06 | End: 2021-10-20

## 2021-04-15 NOTE — LETTER
[address] [facility] Rheumatology  [facility] [address] [address]  Phone: [phone]  Fax: [phone]    Niurka Flores MD        April 15, 2021     Miley Lin MD  [facility] [address] [facility] [address]  Car Coronado MD  [address] [facility] [address]    Patient: Jen Cervantes  MR Number: [id number]  YOB: 1980  Date of Visit: 4/15/2021    Dear Dr. Car Coronado: Thank you for your referral. Progress note attached in visit summary. If you have questions, please do not hesitate to call me. I look forward to following PARMJIT FUENTES [facility] along with you.     Sincerely,        Niurka Flores MD

## 2021-04-16 ENCOUNTER — HOSPITAL ENCOUNTER (OUTPATIENT)
Dept: PHYSICAL THERAPY | Age: 41
Setting detail: THERAPIES SERIES
Discharge: HOME | End: 2021-04-16
Payer: COMMERCIAL

## 2021-04-16 PROCEDURE — 97140 MANUAL THERAPY 1/> REGIONS: CPT

## 2021-04-16 PROCEDURE — 97012 MECHANICAL TRACTION THERAPY: CPT

## 2021-04-16 PROCEDURE — 97035 APP MDLTY 1+ULTRASOUND EA 15: CPT

## 2021-04-16 NOTE — FLOWSHEET NOTE
[address] [facility]. [address]  Phone: [phone]   Fax:     [phone]    Physical Therapy Treatment Note/ Progress Report:            Date:  2021    Patient Name:  Shannan Bauer    :  1980  MRN: [id number]    Pertinent Medical History:      Medical/Treatment Diagnosis Information:  · Diagnosis: M54.81 (ICD-10-CM) - Occipital neuralgia  · Treatment Diagnosis: Pain in neck and headaches, limited mobility of cervical spine , weakness in UE's, tightness of cervical muscles    Insurance/Certification information:  PT Insurance Information: [address] [place] [address] [facility]  Physician Information:  Referring Practitioner: Ailyn Muñiz MD  Plan of care signed (Y/N): Sent to Dr. 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 She then had lumbar fusion surgery in . Spinal pain continued and worsened and she has had PT, aquatic therapy, chiropractic care, pain management, , and medication. Dr. Oleg Hunt attempted to give pt injection on 21 and the muscles were too tight and she could not effectively give the injection. Dr. Oleg Hunt referred her to PT for building  muscle strength , and relaxing muscle tissue. Relief is only temporary.     SUBJECTIVE:    2/3/21: Headaches are less, but spasms in LE's are worse at nights and weakness in both arms is worse. 02/05/21; Patient reports H/A are not as bad,sore on left side of the neck,spasms and weakness in her arms are about the same. 2/8/21: Pt reports that she was ok through most of the day on Saturday and around 4:00 PM she had increase pain on left side of head, headaches and arm pain and soreness too. She took a muscle relaxer last night, and she slept well last night but when she took Gabapentin and muscle relaxer on Saturday it made no difference. She lifted a few things between 12:00 and 3:00 on Saturday. (This may be the activity that caused increase of pain.)  2/11/21: Pt reports that she has tried heat, TENS unit and ice, rest and taking Neurontin. She started the  Prednisone yesterday afternoon. She has been worse, but is a little better today. 02/15/21: Patient reports she has been feeling a little better since she started taking Prednisone. States her H/A and tingling down her arms has been about the same. 2/19/21: Pt reports that her neck  and lumbar pain switch back between 4-5/10. She has completed predinsone and it is not too bad.  2/22/21: \"I had a really good weekend and pain decreased to 3/10, but I woke up sore this morning. I have been a lot more tired lately. I have slept better. \"  2/24/21: Pt reports that she has nerve pain in both arms with burning and tingling. Her \"neck is not that bad, she states\". She spent time in front of computer yesterday, but was not very physical. The UE still fatigue very  easlily. 3/1/21 Pt reports having had bad headaches Saturday but not as bad today. 3/3/21 Pt reports decreased pain for a few hours post last Rx but did experience some nausea latter that same evening. Does not have nausea at this time. She expressed interest in continuing dry needling today.    3/5/21: Pt reports that the Wednesday session of dry needling was helpful and felt longer relief and had a good day yesterday. She was active yesterday too. Today, pain is worse again. 3/15/21 Pt reports some relief from the dry needling.  3/24/21: Pt reports that dry needling is helpful. Since last appt, she has been into ED for double vision and migraine headaches, lightheadedness and dizziness. She has an order for an MRI and will see eye doctor next Tuesday and the following week, she will see neurologist and then the rheumatologist.  3/31/21 Pt reports increased c/o neck and headache pain for the last few days. 4/5/21: Pt reports that she has headaches every day . She reached out to Dr. Alfaro Presumjaneth this morning. The dry needling helped the other day. She is still having the nerve pain and weakness in both arms. 4/7/21: \"I had a horrible day yesterday with headache. I saw a migraine neurologist, Dr. Dagoberto Mccabe, at [facility] yesterday and she discussed as regiment of meds over the next 6 mos to one year. Pt will also see eye doctor next week and rheumatologist later on. \" Her worst pain is in a \"knot\" on left upper cervical area. 4/12/21 Pt reports a decrease in frequency of days with severe head aches. 4/16/21: Pt reports that the Topamax as prescribed by neurologist, has been helping. She is still working with Dr. Dominic Jama on blood pressure meds. She saw rheumatologist (Dr. Jonny Benson) yesterday and she confirmed fibromyalgia. Everything is coming together and helping her  To feel better. OBJECTIVE: 3/5/21   Observation:    Test measurements:  Cervical flexion: 9 with pain  Extension: 3 with  pain   SB L:  21  SB R:  18  ROT L:  30   ROT R:  32                                        UE ROM:  Flexion:   130 bilaterally     Abd:  Left 120   R:  150   Int rot:   60 bilaterally                                        Strength: All major muscle groups of UE test as 3/5 with some tremoring upon resistance. Sensation:  Same only on both forearms.  Oterwise, there are differences in sensation on all other dermatomes. Palpation:  Still very tender and significantly tight    RESTRICTIONS/PRECAUTIONS:   Exercises/Interventions:   Therapeutic Ex (39362)  Min: Resistance/Repetitions Notes   Dorsal glides      UE ranger for scap mobs     To help with LE spasms   To help with LE spasms   OH pulley 2/24/21   Seated cerv. Rotation     Seated cerv. ext 5 x 2    Supine towel roll tucks  15 x 2   Supine dnf 30\" x 3   Seated chin tucks  Post DN   Bicep curls    Shoulder flexion    Scaption    Seated cerv. Levator scap. stretch 30\" x 2 L/R ea    UT stretch  L/R 30\" x 2 L/R ea   Scapula elevation and retraction  Post DN   Manual Intervention (83143)  Min: 25     Cerv mobs/manip OA, AA flexion  Grade 3, cerv. Distraction grade 4  And gross  cerv. Retraction , MET for cervical rotation    Thoracic mobs/manip Prone for thoracic trigger points release and PA's -10 min    CT manip     Rib mobilizations     STM bilat dorsal cerv. And Thor. pvm's 10 min         NMR re-education (07884)  Min:     Theracane         Therapeutic Activity (40988)  Min: 10     Discussed posture and gave brochure on spinal care and safety of wrokstation          Modalities  Min:      [id number] min at 1.5 W/ccm2 while right sidelying   E stim    Has this at home   Int Cervical traction with MHP 16# x 15 min  Good response 4/7/21       Dry needling manual therapy: consisted on the placement of 6 needles in the following muscles:  Splenius capitus L/R, Semispinalis capitus , cervical multifidus C6-7 L/R  And left upper trap. .  A 40 and 60  mm needle was inserted, piston, rotated, and coned to produce intramuscular mobilization. These techniques were used to restore functional range of motion, reduce muscle spasm and induce healing in the corresponding musculature. (81953)  Clean Technique was utilized today while applying Dry needling treatment.   The treatment sites where cleaned with 70% solution of isopropyl alcohol . The PT washed their hands and utilized treatment gloves along with hand  prior to inserting the needles. All needles where removed and discarded in the appropriate sharps container. MD has given verbal and/or written approval for this treatment. Attended low frequency (2-3Hz) electrical stimulation was utilized in conjunction with Dry Needling:  the Estim was manipulated between all above needles for a period of 10 min. at 9 volts. The low frequency electrstimulation was used to help reduce muscle spasm and help to interrupt /Jackson the pain cycle. (03132) Other Therapeutic Activities:  Pt was educated on PT POC, Diagnosis, Prognosis, pathomechanics as well as frequency and duration of scheduling future physical therapy appointments. Time was also taken on this day to answer all patient questions and participation in PT. Reviewed appointment policy in detail with patient and patient verbalized understanding. 2/8/21: Pt felt well with theracane trial and PT wrote the name for her to look into for possible purchase, if pt so desires. 2/11/21: Tejal Hunt, PT spoke with pt and educated her about dry needling as a possible treatment. This therapist called Dr. Naomie Garvin about this treatment and asked registrar to inquire about insurance coverage. 2/19/21: Gave pt further information to consider regarding dry needling treatment. 3/5/21: Pt and PT reviewed NDI and goals and discussed her current condition and changes with PT at length. Reviewed proper body mechanics principles and proper posture. Gave brochures on posture and body mechanics. 3/3/21: Lengthy discussion with patient to update therapist on her condition and insurance allowance. Also gave information about and discussed Gentile home cervical traction device. Home Exercise Program: [address] access code [email or web address] given today.     Therapeutic Exercise and NMR EXR  [x] (04049) Provided verbal/tactile cueing for activities related to strengthening, flexibility, endurance, ROM  for improvements in cervical, postural, scapular, scapulothoracic and UE control with self care, reaching, carrying, lifting, house/yardwork, driving/computer work.    [] (91570) Provided verbal/tactile cueing for activities related to improving balance, coordination, kinesthetic sense, posture, motor skill, proprioception  to assist with cervical, scapular, scapulothoracic and UE control with self care, reaching, carrying, lifting, house/yardwork, driving/computer work. Therapeutic Activities:    [x] (28359 or 46217) Provided verbal/tactile cueing for activities related to improving balance, coordination, kinesthetic sense, posture, motor skill, proprioception and motor activation to allow for proper function of cervical, scapular, scapulothoracic and UE control with self care, carrying, lifting, driving/computer work.      Home Exercise Program:    [x] (35990) Reviewed/Progressed HEP activities related to strengthening, flexibility, endurance, ROM of cervical, scapular, scapulothoracic and UE control with self care, reaching, carrying, lifting, house/yardwork, driving/computer work  [] (50854) Reviewed/Progressed HEP activities related to improving balance, coordination, kinesthetic sense, posture, motor skill, proprioception of cervical, scapular, scapulothoracic and UE control with self care, reaching, carrying, lifting, house/yardwork, driving/computer work      Manual Treatments:  PROM / STM / Oscillations-Mobs:  G-I, II, III, IV (PA's, Inf., Post.)  [x] (75749) Provided manual therapy to mobilize soft tissue/joints of cervical/CT, scapular GHJ and UE for the purpose of decreasing headache, modulating pain, promoting relaxation,  increasing ROM, reducing/eliminating soft tissue swelling/inflammation/restriction, improving soft tissue extensibility and allowing for proper ROM for normal function with self care, reaching, carrying, lifting, house/yardwork, driving/computer work      Charges:  Timed Code Treatment Minutes: 45   Total Treatment Minutes: 65     [] EVAL (LOW) 40658 (typically 20 minutes face-to-face)  [] EVAL (MOD) 69580 (typically 30 minutes face-to-face)  [] EVAL (HIGH) 89488 (typically 45 minutes face-to-face)  [] RE-EVAL     [] TS(47781) x      [] Dry needle 1 or 2 Muscles (33257)  [] NMR (97874) x     [] Dry needle 3+ Muscles (78149)  [x] Manual (98477) x 2   [x] Ultrasound (04037) x 10 min  [] TA (08740) x 2  [x] Mech Traction (69173) 15 min  [] ES(attended) (00908)     [] ES (un) (71527): 15 min  [] Vasopump (76947) [] Ionto (50417)   [] Other:Dry Needling    GOALS:Patient stated goal: \"Build muscle strength and relax muscle tension and range of motion\"  []? Progressing: []? Met: [x]? Not Met: []? Adjusted     Therapist goals for Patient:   Short Term Goals: To be achieved in: 2 weeks  1. Independent in HEP and progression per patient tolerance, in order to prevent re-injury. []? Progressing: [x]? Met: []? Not Met: []? Adjusted  2. Patient will have a decrease in pain to facilitate improvement in movement, function, and ADLs as indicated by Functional Deficits. (to be achieved in 6 weeks)  []? Progressing: []? Met: [x]? Not Met: []? Adjusted     Long Term Goals: To be achieved in:8 weeks  1. Disability index score of 40% or less for the NDI to assist with reaching prior level of function. []? Progressing: []? Met: [x]? Not Met: []? Adjusted  2. Patient will demonstrate increased AROM to [facility] of cervical/thoracic spine to allow for proper joint functioning as indicated by patients Functional Deficits. []? Progressing: []? Met: [x]? Not Met: []? Adjusted  3. Patient will demonstrate an increase in postural awareness and control and activation of  Deep cervical stabilizers to allow for proper functional mobility as indicated by patients Functional Deficits. []? Progressing: [x]? Met: []? Not Met: []? Adjusted  4.  Patient will return to 60%functional activities without increased symptoms or restriction. []? Progressing: []? Met: [x]? Not Met: []? Adjusted  5. Pt will be able to rotate neck 10 degrees further.(patient specific functional goal)    []? Progressing: []? Met: [x]? Not Met: []? Adjusted               ASSESSMENT:  Patient has pain changes by the day. There has not been a consistent improvement in her condition with PT. Her cervical spinal ROM is actually less today than on initial eval and strength is about the same, but tremoring was noted upon resistance. Of concern is the paresthesia on both arms. She continues to have significant tightness on cervical spine, but feels as if dry needling was helpful. Treatment/Activity Tolerance:  [x] Patient tolerated treatment well [] Patient limited by fatique  [] Patient limited by pain  [] Patient limited by other medical complications  [] Other:     Overall Progression Towards Functional goals/ Treatment Progress Update:  [] Patient is progressing as expected towards functional goals listed. [] Progression is slowed due to complexities/Impairments listed. [x] Progression has been slowed due to co-morbidities. [] Plan just implemented, too soon to assess goals progression <30days   [] Goals require adjustment due to lack of progress  [] Patient is not progressing as expected and requires additional follow up with physician  [] Other    Prognosis for POC: [x] Good [] Fair  [] Poor    Patient requires continued skilled intervention: [x] Yes  [] No        PLAN:  Pt will continue with 2-3 x times weekly for 4-5 more weeks as needed, alternating between dry needling and traditional PT and  between two therapists (Jj Cunningham and [employer]).      [x] Continue per plan of care [] Alter current plan (see comments)  [] Plan of care initiated [] Hold pending MD visit [] Discharge     Electronically signed by: Heraclio Carmichael PT    Note: If patient does not return for scheduled/recommended follow up visits, this note will serve as a discharge from care along with the most recent update on progress.

## 2021-04-26 ENCOUNTER — OFFICE VISIT (OUTPATIENT)
Dept: FAMILY MEDICINE CLINIC | Age: 41
End: 2021-04-26
Payer: COMMERCIAL

## 2021-04-26 VITALS
SYSTOLIC BLOOD PRESSURE: 112 MMHG | OXYGEN SATURATION: 98 % | HEIGHT: 67 IN | RESPIRATION RATE: 10 BRPM | BODY MASS INDEX: 22.44 KG/M2 | WEIGHT: 143 LBS | DIASTOLIC BLOOD PRESSURE: 74 MMHG | HEART RATE: 90 BPM

## 2021-04-26 DIAGNOSIS — M54.12 CERVICAL RADICULOPATHY: ICD-10-CM

## 2021-04-26 DIAGNOSIS — Z72.0 TOBACCO USE: ICD-10-CM

## 2021-04-26 DIAGNOSIS — Z87.828 HISTORY OF TRAUMA: ICD-10-CM

## 2021-04-26 DIAGNOSIS — I10 ESSENTIAL HYPERTENSION: ICD-10-CM

## 2021-04-26 DIAGNOSIS — Z00.00 WELL ADULT HEALTH CHECK: Primary | ICD-10-CM

## 2021-04-26 DIAGNOSIS — M79.601 BILATERAL ARM PAIN: ICD-10-CM

## 2021-04-26 DIAGNOSIS — G89.4 CHRONIC PAIN SYNDROME: ICD-10-CM

## 2021-04-26 DIAGNOSIS — M79.602 BILATERAL ARM PAIN: ICD-10-CM

## 2021-04-26 PROCEDURE — 4004F PT TOBACCO SCREEN RCVD TLK: CPT | Performed by: FAMILY MEDICINE

## 2021-04-26 PROCEDURE — G8427 DOCREV CUR MEDS BY ELIG CLIN: HCPCS | Performed by: FAMILY MEDICINE

## 2021-04-26 PROCEDURE — 99396 PREV VISIT EST AGE 40-64: CPT | Performed by: FAMILY MEDICINE

## 2021-04-26 PROCEDURE — 99213 OFFICE O/P EST LOW 20 MIN: CPT | Performed by: FAMILY MEDICINE

## 2021-04-26 PROCEDURE — G8420 CALC BMI NORM PARAMETERS: HCPCS | Performed by: FAMILY MEDICINE

## 2021-04-26 RX ORDER — GABAPENTIN 600 MG/1
600 TABLET ORAL 3 TIMES DAILY
Qty: 90 TABLET | Refills: 2 | Status: SHIPPED
Start: 2021-04-26 | End: 2022-04-06 | Stop reason: DRUGHIGH

## 2021-04-26 RX ORDER — PREDNISONE 10 MG/1
TABLET ORAL
Qty: 16 TABLET | Refills: 0 | Status: SHIPPED | OUTPATIENT
Start: 2021-04-26 | End: 2021-07-12

## 2021-04-26 RX ORDER — LOSARTAN POTASSIUM AND HYDROCHLOROTHIAZIDE 12.5; 5 MG/1; MG/1
1 TABLET ORAL DAILY
Qty: 90 TABLET | Refills: 1 | Status: SHIPPED | OUTPATIENT
Start: 2021-04-26 | End: 2021-07-12

## 2021-04-26 ASSESSMENT — ENCOUNTER SYMPTOMS
EYE REDNESS: 0
COLOR CHANGE: 0
COUGH: 0
SHORTNESS OF BREATH: 0
NAUSEA: 0
DIARRHEA: 0
SORE THROAT: 0
VOMITING: 0
SINUS PRESSURE: 0
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
## 2021-04-27 ENCOUNTER — HOSPITAL ENCOUNTER (OUTPATIENT)
Dept: PHYSICAL THERAPY | Age: 41
Setting detail: THERAPIES SERIES
Discharge: HOME | End: 2021-04-27
Payer: COMMERCIAL

## 2021-04-27 PROCEDURE — 97140 MANUAL THERAPY 1/> REGIONS: CPT

## 2021-04-27 PROCEDURE — 97110 THERAPEUTIC EXERCISES: CPT
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
## 2021-04-28 ENCOUNTER — HOSPITAL ENCOUNTER (OUTPATIENT)
Dept: PHYSICAL THERAPY | Age: 41
Setting detail: THERAPIES SERIES
Discharge: HOME | End: 2021-04-28
Payer: COMMERCIAL

## 2021-04-28 NOTE — PLAN OF CARE
[facility]  Phone: [phone]   Fax:     [phone]       [facility]  Phone: [phone]   Fax:     [phone]     Physical Therapy Discharge Summary    Dear  Dr. Corinne Velazquez,    We had the pleasure of treating the following patient for physical therapy services at [address] [place] [address]. A summary of our findings can be found in the discharge summary below. If you have any questions or concerns regarding these findings, please do not hesitate to contact me at the office phone number above.   Thank you for the referral.     Physician Signature:________________________________Date:__________________  By signing above (or electronic signature), therapists plan is approved by physician      Overall Response to Treatment:   [x]Patient is responding well to treatment and improvement is noted with regards  to goals   []Patient should continue to improve in reasonable time if they continue HEP   []Patient has plateaued and is no longer responding to skilled PT intervention    []Patient is getting worse and would benefit from return to referring MD   []Patient unable to adhere to initial POC   []Other:     Date range of Visits: 21-21  Total Visits: 20           Date:  2021    Patient Name:  Bishnu Hi    :  1980  MRN: [id number]    Pertinent Medical History:      Medical/Treatment Diagnosis Information:  · Diagnosis: M54.81 (ICD-10-CM) - Occipital neuralgia  · Treatment Diagnosis: Pain in neck and headaches, limited mobility of cervical spine , weakness in UE's, tightness of cervical muscles    Insurance/Certification information:  PT Insurance Information: Mal[employer]  Physician Information:  Referring Practitioner: Kimberley Graves MD  Plan of She has been worse, but is a little better today. 02/15/21: Patient reports she has been feeling a little better since she started taking Prednisone. States her H/A and tingling down her arms has been about the same. 2/19/21: Pt reports that her neck  and lumbar pain switch back between 4-5/10. She has completed predinsone and it is not too bad.  2/22/21: \"I had a really good weekend and pain decreased to 3/10, but I woke up sore this morning. I have been a lot more tired lately. I have slept better. \"  2/24/21: Pt reports that she has nerve pain in both arms with burning and tingling. Her \"neck is not that bad, she states\". She spent time in front of computer yesterday, but was not very physical. The UE still fatigue very  easlily. 3/1/21 Pt reports having had bad headaches Saturday but not as bad today. 3/3/21 Pt reports decreased pain for a few hours post last Rx but did experience some nausea latter that same evening. Does not have nausea at this time. She expressed interest in continuing dry needling today. 3/5/21: Pt reports that the Wednesday session of dry needling was helpful and felt longer relief and had a good day yesterday. She was active yesterday too. Today, pain is worse again. 3/15/21 Pt reports some relief from the dry needling.  3/24/21: Pt reports that dry needling is helpful. Since last appt, she has been into ED for double vision and migraine headaches, lightheadedness and dizziness. She has an order for an MRI and will see eye doctor next Tuesday and the following week, she will see neurologist and then the rheumatologist.  3/31/21 Pt reports increased c/o neck and headache pain for the last few days. 4/5/21: Pt reports that she has headaches every day . She reached out to Dr. Hawa Aldridge this morning. The dry needling helped the other day. She is still having the nerve pain and weakness in both arms. 4/7/21: \"I had a horrible day yesterday with headache.  I saw a migraine neurologist,  Joanne Segovia, at [facility] yesterday and she discussed as regiment of meds over the next 6 mos to one year. Pt will also see eye doctor next week and rheumatologist later on. \" Her worst pain is in a \"knot\" on left upper cervical area. 4/12/21 Pt reports a decrease in frequency of days with severe head aches. 4/16/21: Pt reports that the Topamax as prescribed by neurologist, has been helping. She is still working with Dr. Mary Asif on blood pressure meds. She saw rheumatologist (Dr. Lea Lynn) yesterday and she confirmed fibromyalgia. Everything is coming together and helping her  To feel better. 4/27/21 Pt reports her neck pain is noticeably improved and headaches are not as bad. She reports more pain at her right shoulder and UE today. OBJECTIVE: 3/5/21   Observation:    Test measurements:  Cervical flexion: WNL  Extension: WNL   SB L:  21  SB R:  18  ROT L:  30   ROT R:  32                                        UE ROM:  Flexion:   130 bilaterally     Abd:  Left 120   R:  150   Int rot:   60 bilaterally                                        Strength: All major muscle groups of UE test as 3/5 with some tremoring upon resistance. Sensation:  Same only on both forearms. Oterwise, there are differences in sensation on all other dermatomes. Palpation:  Less tender and less tight    RESTRICTIONS/PRECAUTIONS:   Exercises/Interventions:   Therapeutic Ex (21411)  Min: Resistance/Repetitions Notes   Dorsal glides      UE ranger for scap mobs     To help with LE spasms   To help with LE spasms   T slide   Rows   Bilat. Ext  ADD  IR  ER   Blue 30 x  Green 30 x  RUE green 30 x   Green 30 x  Red 10 x 3                        OH pulley 2/24/21   Seated cerv. Rotation     Seated cerv. ext 5 x 2    Supine towel roll tucks  15 x 2   Supine dnf 30\" x 3   Seated chin tucks  Post DN   Bicep curls    Shoulder flexion    Scaption    Seated cerv. Levator scap. stretch 30\" x 2 L/R ea    UT stretch  L/R 30\" x 2 L/R ea   Scapula elevation and retraction  Post DN   Manual Intervention (83670)  Min: 25 30 min     Cerv mobs/manip    Thoracic mobs/manip    CT manip     Rib mobilizations     STM Right scapular muscles     Nerve glides  RUE median, ulnar and radial     Mobs  R scap. Thoracic and GHJ A-P and distraction ea grade 3               NMR re-education (39533)  Min:     Theracane         Therapeutic Activity (94497)  Min: 10     Discussed posture and gave brochure on spinal care and safety of wrokstation          Modalities  Min:      [id number] min at 1.5 W/ccm2 while right sidelying   E stim    Has this at home   Int Cervical traction with MHP 16# x 15 min  Good response 4/7/21       Dry needling manual therapy: consisted on the placement of 6 needles in the following muscles:  Splenius capitus L/R, Semispinalis capitus , cervical multifidus C6-7 L/R  And left upper trap. .  A 40 and 60  mm needle was inserted, piston, rotated, and coned to produce intramuscular mobilization. These techniques were used to restore functional range of motion, reduce muscle spasm and induce healing in the corresponding musculature. (71197)  Clean Technique was utilized today while applying Dry needling treatment. The treatment sites where cleaned with 70% solution of  isopropyl alcohol . The PT washed their hands and utilized treatment gloves along with hand  prior to inserting the needles. All needles where removed and discarded in the appropriate sharps container. MD has given verbal and/or written approval for this treatment. Attended low frequency (2-3Hz) electrical stimulation was utilized in conjunction with Dry Needling:  the Estim was manipulated between all above needles for a period of 10 min. at 9 volts. The low frequency electrstimulation was used to help reduce muscle spasm and help to interrupt /[place] the pain cycle.  (47595) Other Therapeutic Activities: Pt was educated on PT POC, Diagnosis, Prognosis, pathomechanics as well as frequency and duration of scheduling future physical therapy appointments. Time was also taken on this day to answer all patient questions and participation in PT. Reviewed appointment policy in detail with patient and patient verbalized understanding. 2/8/21: Pt felt well with theracane trial and PT wrote the name for her to look into for possible purchase, if pt so desires. 2/11/21: Nova Hernández, PT spoke with pt and educated her about dry needling as a possible treatment. This therapist called Dr. Zarina Hernandez about this treatment and asked registrar to inquire about insurance coverage. 2/19/21: Gave pt further information to consider regarding dry needling treatment. 3/5/21: Pt and PT reviewed NDI and goals and discussed her current condition and changes with PT at length. Reviewed proper body mechanics principles and proper posture. Gave brochures on posture and body mechanics. 3/3/21: Lengthy discussion with patient to update therapist on her condition and insurance allowance. Also gave information about and discussed Gentile home cervical traction device. Home Exercise Program: [address] access code [id number] given today. Therapeutic Exercise and NMR EXR  [x] (26034) Provided verbal/tactile cueing for activities related to strengthening, flexibility, endurance, ROM  for improvements in cervical, postural, scapular, scapulothoracic and UE control with self care, reaching, carrying, lifting, house/yardwork, driving/computer work.    [] (03976) Provided verbal/tactile cueing for activities related to improving balance, coordination, kinesthetic sense, posture, motor skill, proprioception  to assist with cervical, scapular, scapulothoracic and UE control with self care, reaching, carrying, lifting, house/yardwork, driving/computer work.     Therapeutic Activities:    [x] (11246 or 42466) Provided verbal/tactile cueing for activities Needling    GOALS:Patient stated goal: \"Build muscle strength and relax muscle tension and range of motion\"  []? Progressing: []? Met: [x]? Not Met: []? Adjusted     Therapist goals for Patient:   Short Term Goals: To be achieved in: 2 weeks  1. Independent in HEP and progression per patient tolerance, in order to prevent re-injury. []? Progressing: [x]? Met: []? Not Met: []? Adjusted  2. Patient will have a decrease in pain to facilitate improvement in movement, function, and ADLs as indicated by Functional Deficits. (to be achieved in 6 weeks)  [x]? Progressing: []? Met: []? Not Met: []? Adjusted     Long Term Goals: To be achieved in:8 weeks  1. Disability index score of 40% or less for the NDI to assist with reaching prior level of function. []? Progressing: [x]? Met: []? Not Met: []? Adjusted  2. Patient will demonstrate increased AROM to [facility] of cervical/thoracic spine to allow for proper joint functioning as indicated by patients Functional Deficits. []? Progressing: [x]? Met: []? Not Met: []? Adjusted  3. Patient will demonstrate an increase in postural awareness and control and activation of  Deep cervical stabilizers to allow for proper functional mobility as indicated by patients Functional Deficits. []? Progressing: [x]? Met: []? Not Met: []? Adjusted  4. Patient will return to 60%functional activities without increased symptoms or restriction. []? Progressing: []? Met: [x]? Not Met: []? Adjusted  5. Pt will be able to rotate neck 10 degrees further.(patient specific functional goal)    []? Progressing: [x]? Met: []? Not Met: []? Adjusted               ASSESSMENT:  Patient has pain changes by the day. There has  been a consistent improvement in her condition with PT. Her cervical spinal ROM is better today, (nearly WNL). Strength is about the same, but tremoring was noted upon resistance. Of concern is the paresthesia on both arms. She feels as if dry needling was helpful.     Treatment/Activity Tolerance:  [x] Patient tolerated treatment well [] Patient limited by fatique  [] Patient limited by pain  [] Patient limited by other medical complications  [] Other:     Overall Progression Towards Functional goals/ Treatment Progress Update:  [] Patient is progressing as expected towards functional goals listed. [] Progression is slowed due to complexities/Impairments listed. [x] Progression has been slowed due to co-morbidities. [] Plan just implemented, too soon to assess goals progression <30days   [] Goals require adjustment due to lack of progress  [] Patient is not progressing as expected and requires additional follow up with physician  [] Other    Prognosis for POC: [x] Good [] Fair  [] Poor    Patient requires continued skilled intervention: [] Yes  [x] No - will hold on PT for now, as patient has only 30 visits per calendar year. PLAN:.     [] Continue per plan of care [] Alter current plan (see comments)  [] Plan of care initiated [] Hold pending MD visit [x] Discharge for now, as pt has used 20 of 30 visits allowed by insurance per calendar year. Electronically signed by: Nya Dean PT    Note: If patient does not return for scheduled/recommended follow up visits, this note will serve as a discharge from care along with the most recent update on progress.

## 2021-05-05 ENCOUNTER — HOSPITAL ENCOUNTER (OUTPATIENT)
Dept: GENERAL RADIOLOGY | Age: 41
Discharge: HOME | End: 2021-05-05
Payer: COMMERCIAL

## 2021-05-05 ENCOUNTER — HOSPITAL ENCOUNTER (OUTPATIENT)
Age: 41
Discharge: HOME | End: 2021-05-05
Payer: COMMERCIAL

## 2021-05-05 DIAGNOSIS — I10 ESSENTIAL HYPERTENSION: ICD-10-CM

## 2021-05-05 DIAGNOSIS — Z00.00 WELL ADULT HEALTH CHECK: ICD-10-CM

## 2021-05-05 DIAGNOSIS — M79.10 MUSCLE PAIN: ICD-10-CM

## 2021-05-05 DIAGNOSIS — M25.50 MULTIPLE JOINT PAIN: ICD-10-CM

## 2021-05-05 LAB
ANION GAP SERPL CALCULATED.3IONS-SCNC: 14 MMOL/L (ref 3–16)
BUN BLDV-MCNC: 20 MG/DL (ref 7–20)
C-REACTIVE PROTEIN: <3 MG/L (ref 0–5.1)
CALCIUM SERPL-MCNC: 9.3 MG/DL (ref 8.3–10.6)
CHLORIDE BLD-SCNC: 100 MMOL/L (ref 99–110)
CHOLESTEROL, TOTAL: 155 MG/DL (ref 0–199)
CO2: 23 MMOL/L (ref 21–32)
CREAT SERPL-MCNC: 0.9 MG/DL (ref 0.6–1.1)
GFR AFRICAN AMERICAN: >60
GFR NON-AFRICAN AMERICAN: >60
GLUCOSE BLD-MCNC: 133 MG/DL (ref 70–99)
HDLC SERPL-MCNC: 68 MG/DL (ref 40–60)
HEPATITIS B CORE IGM ANTIBODY: NORMAL
HEPATITIS B SURFACE ANTIGEN INTERPRETATION: NORMAL
HEPATITIS C ANTIBODY INTERPRETATION: NORMAL
LDL CHOLESTEROL CALCULATED: 70 MG/DL
POTASSIUM SERPL-SCNC: 4.2 MMOL/L (ref 3.5–5.1)
RHEUMATOID FACTOR: <10 IU/ML
SEDIMENTATION RATE, ERYTHROCYTE: 3 MM/HR (ref 0–20)
SODIUM BLD-SCNC: 137 MMOL/L (ref 136–145)
TOTAL CK: 54 U/L (ref 26–192)
TRIGL SERPL-MCNC: 84 MG/DL (ref 0–150)
TSH REFLEX FT4: 0.95 UIU/ML (ref 0.27–4.2)
VITAMIN B-12: 631 PG/ML (ref 211–911)
VITAMIN D 25-HYDROXY: 41.4 NG/ML
VLDLC SERPL CALC-MCNC: 17 MG/DL

## 2021-05-05 PROCEDURE — 73130 X-RAY EXAM OF HAND: CPT

## 2021-05-06 LAB — CYCLIC CITRULLINATED PEPTIDE ANTIBODY IGG: <0.5 U/ML (ref 0–2.9)

## 2021-05-07 ENCOUNTER — PATIENT MESSAGE (OUTPATIENT)
Dept: FAMILY MEDICINE CLINIC | Age: 41
End: 2021-05-07

## 2021-05-07 LAB
ALBUMIN SERPL-MCNC: 3.9 G/DL (ref 3.1–4.9)
ALPHA-1-GLOBULIN: 0.2 G/DL (ref 0.2–0.4)
ALPHA-2-GLOBULIN: 0.6 G/DL (ref 0.4–1.1)
ANTINUCLEAR AB INTERPRETIVE COMMENT: NORMAL
ANTINUCLEAR ANTIBODY, HEP-2, IGG: NORMAL
BETA GLOBULIN: 0.9 G/DL (ref 0.9–1.6)
GAMMA GLOBULIN: 1.1 G/DL (ref 0.6–1.8)
SPE/IFE INTERPRETATION: NORMAL
TOTAL PROTEIN: 6.7 G/DL (ref 6.4–8.2)

## 2021-05-07 NOTE — TELEPHONE ENCOUNTER
From: Margy Fontaine  To: Rocio Rocha MD  Sent: 5/7/2021 7:04 AM EDT  Subject: Test Results Question    Hi Dr. Nikkie Roberts,    I wanted to reach out having seeing that all the blood test are in. Should we schedule a follow up to review these results? I'm sure you understand I don't know how to read the results. Also since seeing you my \"fibromyalgia\" symptoms are progressing. It's not just the joints but throughout my entire arms, legs, and into my feet. I'm getting burning sensation. Quite strongly and lasting throughout the day. Seems the worse in the morning and evening but quite bothersome period. Some of the feelings/sensation are difficult to describe. Like the nerves are constantly firing and much higher. Either way the neurotin already seems to not be helping much. I know we talk about another medication also. Maybe it's time to try that unfortunately. As I've not had the opportunity to call about the referral you provided.      Thanks,    Marva Muniz   [phone] cell phone

## 2021-05-10 ENCOUNTER — TELEPHONE (OUTPATIENT)
Dept: RHEUMATOLOGY | Age: 41
End: 2021-05-10

## 2021-05-24 RX ORDER — DULOXETIN HYDROCHLORIDE 20 MG/1
20 CAPSULE, DELAYED RELEASE ORAL DAILY
Qty: 30 CAPSULE | Refills: 1 | Status: SHIPPED | OUTPATIENT
Start: 2021-05-24 | End: 2021-07-07 | Stop reason: SDUPTHER

## 2021-05-24 NOTE — TELEPHONE ENCOUNTER
Patient had progesterone through [facility] in [place] we need results for drawn at end of last week. Thank you.

## 2021-05-29 ENCOUNTER — HOSPITAL ENCOUNTER (OUTPATIENT)
Dept: MRI IMAGING | Age: 41
Discharge: HOME | End: 2021-05-29
Payer: COMMERCIAL

## 2021-05-29 DIAGNOSIS — M48.02 CERVICAL SPINAL STENOSIS: ICD-10-CM

## 2021-05-29 PROCEDURE — 72141 MRI NECK SPINE W/O DYE: CPT

## 2021-06-28 DIAGNOSIS — M54.2 CHRONIC NECK PAIN: ICD-10-CM

## 2021-06-28 DIAGNOSIS — M48.02 FORAMINAL STENOSIS OF CERVICAL REGION: ICD-10-CM

## 2021-06-28 DIAGNOSIS — G89.29 CHRONIC NECK PAIN: ICD-10-CM

## 2021-06-28 RX ORDER — NAPROXEN 500 MG/1
500 TABLET ORAL 2 TIMES DAILY WITH MEALS
Qty: 60 TABLET | Refills: 2 | Status: SHIPPED | OUTPATIENT
Start: 2021-06-28 | End: 2022-01-19 | Stop reason: SDUPTHER

## 2021-07-07 ENCOUNTER — TELEPHONE (OUTPATIENT)
Dept: FAMILY MEDICINE CLINIC | Age: 41
End: 2021-07-07

## 2021-07-07 ENCOUNTER — NURSE TRIAGE (OUTPATIENT)
Dept: OTHER | Facility: CLINIC | Age: 41
End: 2021-07-07

## 2021-07-07 RX ORDER — DULOXETINE 40 MG/1
40 CAPSULE, DELAYED RELEASE ORAL DAILY
Qty: 90 CAPSULE | Refills: 0 | Status: SHIPPED
Start: 2021-07-07 | End: 2021-07-12 | Stop reason: DRUGHIGH

## 2021-07-07 NOTE — TELEPHONE ENCOUNTER
Patient calling stating she is wanting to increase prescription cymbalta. Patient stating not seeing much improvement on medication. Patient felt the first few weeks very helpful, effective. The past few weeks struggling with mood, stress, and anxiety. Patient wanting to speak to Dr. Derek Gallo not sure if we could increase medication. Pharmacy [facility] [address] [place] [address]     [facility] is [facility]. If needing a follow up need to be vv or how long does patient have to wait to come in for appt due to sick symptoms.      Please advise

## 2021-07-07 NOTE — TELEPHONE ENCOUNTER
Please advise if ok to increase  I will help her set up f/u appt at the end of the month  LOV was in April

## 2021-07-07 NOTE — TELEPHONE ENCOUNTER
Received call from [facility] with [employer] Complaint. Brief description of triage: Pt with a new headache for the past 1-2 days. Headache today is a 4-5/10. Has a history of chronic migraines and is on Topamax-sees  migraine clinic. Also, with sore throat, body aches and ear pain. Triage indicates for patient to see today or tomorrow. Care advice provided, patient verbalizes understanding; denies any other questions or concerns; instructed to call back for any new or worsening symptoms. Writer provided warm transfer to [facility] for appointment scheduling. Attention Provider: Thank you for allowing me to participate in the care of your patient. The patient was connected to triage in response to information provided to the [facility]. Please do not respond through this encounter as the response is not directed to a shared pool. Reason for Disposition   Unexplained headache that is present > 24 hours    Answer Assessment - Initial Assessment Questions  1. LOCATION: \"Where does it hurt? \"       Feels headache in front of head, temple area down to ears    2. ONSET: \"When did the headache start? \" (Minutes, hours or days)    started about 1-2 days ago    3. PATTERN: \"Does the pain come and go, or has it been constant since it started?\"    4. SEVERITY: \"How bad is the pain? \" and \"What does it keep you from doing? \"  (e.g., Scale 1-10; mild, moderate, or severe)    - MILD (1-3): doesn't interfere with normal activities     - MODERATE (4-7): interferes with normal activities or awakens from sleep     - SEVERE (8-10): excruciating pain, unable to do any normal activities       Headache today is a 4-5/10    5. RECURRENT SYMPTOM: \"Have you ever had headaches before? \" If so, ask: \"When was the last time? \" and \"What happened that time?\"       6. CAUSE: \"What do you think is causing the headache?\"      7. MIGRAINE: \"Have you been diagnosed with migraine headaches? \" If so, ask: \"Is this headache similar? \"       History of migraines-has done PT and dry needling, sees  migraine clinic, is on Topamax. 8. HEAD INJURY: \"Has there been any recent injury to the head?\"      9. OTHER SYMPTOMS: \"Do you have any other symptoms? \" (fever, stiff neck, eye pain, sore throat, cold symptoms)    Ear pain, mild sore throat, neck and shoulder pain, some mild body aches  No cough, slight runny nose  No fever    10. PREGNANCY: \"Is there any chance you are pregnant? \" \"When was your last menstrual period? \"     no    Protocols used: HEADACHE-ADULT-[place]

## 2021-07-07 NOTE — TELEPHONE ENCOUNTER
Please call pt and help her schedule f/u in the next month  Dr Coronado sent in higher dose as requested

## 2021-07-08 ENCOUNTER — TELEPHONE (OUTPATIENT)
Dept: FAMILY MEDICINE CLINIC | Age: 41
End: 2021-07-08

## 2021-07-12 ENCOUNTER — OFFICE VISIT (OUTPATIENT)
Dept: FAMILY MEDICINE CLINIC | Age: 41
End: 2021-07-12
Payer: COMMERCIAL

## 2021-07-12 VITALS
DIASTOLIC BLOOD PRESSURE: 60 MMHG | HEIGHT: 67 IN | BODY MASS INDEX: 20.4 KG/M2 | OXYGEN SATURATION: 96 % | SYSTOLIC BLOOD PRESSURE: 100 MMHG | RESPIRATION RATE: 10 BRPM | HEART RATE: 64 BPM | WEIGHT: 130 LBS

## 2021-07-12 DIAGNOSIS — F32.A ANXIETY AND DEPRESSION: ICD-10-CM

## 2021-07-12 DIAGNOSIS — R42 DIZZINESS: ICD-10-CM

## 2021-07-12 DIAGNOSIS — F41.9 ANXIETY AND DEPRESSION: ICD-10-CM

## 2021-07-12 DIAGNOSIS — R63.0 DECREASED APPETITE: ICD-10-CM

## 2021-07-12 DIAGNOSIS — I95.9 HYPOTENSION, UNSPECIFIED HYPOTENSION TYPE: Primary | ICD-10-CM

## 2021-07-12 PROCEDURE — 99214 OFFICE O/P EST MOD 30 MIN: CPT | Performed by: FAMILY MEDICINE

## 2021-07-12 PROCEDURE — G8420 CALC BMI NORM PARAMETERS: HCPCS | Performed by: FAMILY MEDICINE

## 2021-07-12 PROCEDURE — G8427 DOCREV CUR MEDS BY ELIG CLIN: HCPCS | Performed by: FAMILY MEDICINE

## 2021-07-12 PROCEDURE — 4004F PT TOBACCO SCREEN RCVD TLK: CPT | Performed by: FAMILY MEDICINE

## 2021-07-12 RX ORDER — DULOXETIN HYDROCHLORIDE 30 MG/1
30 CAPSULE, DELAYED RELEASE ORAL DAILY
Qty: 30 CAPSULE | Refills: 1 | Status: SHIPPED | OUTPATIENT
Start: 2021-07-12 | End: 2021-10-20

## 2021-07-12 RX ORDER — DULOXETIN HYDROCHLORIDE 60 MG/1
60 CAPSULE, DELAYED RELEASE ORAL DAILY
Qty: 90 CAPSULE | Refills: 1 | Status: SHIPPED
Start: 2021-07-12 | End: 2021-07-12 | Stop reason: DRUGHIGH

## 2021-07-12 NOTE — PROGRESS NOTES
7/12/2021    This is a 38 y.o. female   Chief Complaint   Patient presents with    Other     hypotension     HPI    Here for follow up  - she went to to our [address] [facility] due to some viral symptoms she was experiencing. Light-headedness  - she was noted to have low BP and her most recent BP medication was held  - she also got a new rx for her glasses that were bifocals and were challenging for her to adjust to. She is still working to find the right strength for different distances that work well for her    Decreased appetite  - due to Topamax which she takes for chronic headaches. She is weaning off this now with her Neurologist.    Reports during this time worsening of her anxiety and depression. On Cymbalta 40mg - she notes this was significantly helpful at first but that it's beneficial effect isn't as helpful as it once was. Review of Systems     Current Outpatient Medications   Medication Sig Dispense Refill    DULoxetine (CYMBALTA) 60 MG extended release capsule Take 1 capsule by mouth daily 90 capsule 1    naproxen (NAPROSYN) 500 MG tablet Take 1 tablet by mouth 2 times daily (with meals) 60 tablet 2    gabapentin (NEURONTIN) 600 MG tablet Take 1 tablet by mouth 3 times daily for 90 days. 90 tablet 2    topiramate (TOPAMAX) 25 MG tablet 1 tab by mouth at bedtime x 2weeks if tolerating 2tabs at bedtime      Potassium 99 MG TABS Take by mouth daily      Magnesium 500 MG CAPS Take by mouth daily      Ascorbic Acid (VITAMIN C) 500 MG CAPS Take by mouth daily      Nutritional Supplements (HIGH-PROTEIN NUTRITIONAL SHAKE PO) Take by mouth [employer] shake daily.       verapamil (CALAN SR) 180 MG extended release tablet Take 1 tablet by mouth daily 90 tablet 1    omeprazole (PRILOSEC) 20 MG delayed release capsule Take 1 capsule by mouth daily 30 capsule 0    albuterol sulfate HFA (VENTOLIN HFA) 108 (90 Base) MCG/ACT inhaler Inhale 2 puffs into the lungs 4 times daily as needed for Wheezing 1 Inhaler 5    tiZANidine (ZANAFLEX) 2 MG tablet Take 1 tablet by mouth nightly as needed (neck pain) 10 tablet 0    docusate sodium (COLACE) 100 MG capsule Take 100 mg by mouth 2 times daily as needed       senna (SENOKOT) 8.6 MG tablet Take 1 tablet by mouth daily      melatonin 3 MG TABS tablet Take 3 mg by mouth daily      acetaminophen (APAP EXTRA STRENGTH) 500 MG tablet Take 2 tablets by mouth every 6 hours as needed for Pain DO NOT TAKE WITH OTHER MEDICATIONS CONTAINING ACETAMINOPHEN. 30 tablet 0    losartan-hydroCHLOROthiazide (HYZAAR) 50-12.5 MG per tablet Take 1 tablet by mouth daily (Patient not taking: Reported on 7/12/2021) 90 tablet 1     No current facility-administered medications for this visit. /60 (Site: Right Upper Arm, Position: Sitting, Cuff Size: Small Adult)   Pulse 64   Resp 10   Ht 5' 7\" (1.702 m)   Wt 130 lb (59 kg)   SpO2 96%   BMI 20.36 kg/m²     Physical Exam    Wt Readings from Last 3 Encounters:   07/12/21 130 lb (59 kg)   07/07/21 128 lb (58.1 kg)   04/26/21 143 lb (64.9 kg)       BP Readings from Last 3 Encounters:   07/12/21 100/60   07/07/21 (!) 96/56   04/26/21 112/74         Assessment/Plan:  1. Hypotension, unspecified hypotension type    2. Dizziness    3. Decreased appetite    4. Anxiety and depression  - DULoxetine (CYMBALTA) 60 MG extended release capsule; Take 1 capsule by mouth daily  Dispense: 90 capsule; Refill: 1    5. Chronic migraine    Recent weight loss from appetite suppression from Topamax. No longer needs BP medication Hyzaar due to low BPs now so will discontinue it  - dizziness likely from combination of low BP and Topamax.  Weaning off Topamax with neurology  - for mood and for migraines, increase Cymbalta to 60mg

## 2021-10-08 ENCOUNTER — TELEPHONE (OUTPATIENT)
Dept: FAMILY MEDICINE CLINIC | Age: 41
End: 2021-10-08

## 2021-10-08 NOTE — TELEPHONE ENCOUNTER
Pt stated she is having uti symptoms she is staying with her brother in law he is having sore throat tested positive for covid today pt is not having any symptoms at this time she stated she has had covid before so she is wanting to know what the protocol? Should she be tested?     Pt stated over the last few weeks she is urinating more often when she goes its in small amounts  The urine is a bright yellow now its a dull cloudy clear color pt has a very strong odor   Pt broke a tooth about a week ago she feels like she may have a infection going on she is not in pain     Please advise

## 2021-10-20 ENCOUNTER — OFFICE VISIT (OUTPATIENT)
Dept: FAMILY MEDICINE CLINIC | Age: 41
End: 2021-10-20
Payer: COMMERCIAL

## 2021-10-20 VITALS
WEIGHT: 138 LBS | HEART RATE: 78 BPM | HEIGHT: 67 IN | OXYGEN SATURATION: 98 % | RESPIRATION RATE: 18 BRPM | SYSTOLIC BLOOD PRESSURE: 115 MMHG | BODY MASS INDEX: 21.66 KG/M2 | DIASTOLIC BLOOD PRESSURE: 78 MMHG

## 2021-10-20 DIAGNOSIS — M79.7 FIBROMYALGIA: ICD-10-CM

## 2021-10-20 DIAGNOSIS — F41.9 ANXIETY: Primary | ICD-10-CM

## 2021-10-20 DIAGNOSIS — F41.0 PANIC ATTACKS: ICD-10-CM

## 2021-10-20 DIAGNOSIS — Z87.828 HISTORY OF TRAUMA: ICD-10-CM

## 2021-10-20 PROCEDURE — G8484 FLU IMMUNIZE NO ADMIN: HCPCS | Performed by: FAMILY MEDICINE

## 2021-10-20 PROCEDURE — 99214 OFFICE O/P EST MOD 30 MIN: CPT | Performed by: FAMILY MEDICINE

## 2021-10-20 PROCEDURE — G8420 CALC BMI NORM PARAMETERS: HCPCS | Performed by: FAMILY MEDICINE

## 2021-10-20 PROCEDURE — 4004F PT TOBACCO SCREEN RCVD TLK: CPT | Performed by: FAMILY MEDICINE

## 2021-10-20 PROCEDURE — G8427 DOCREV CUR MEDS BY ELIG CLIN: HCPCS | Performed by: FAMILY MEDICINE

## 2021-10-20 RX ORDER — BUSPIRONE HYDROCHLORIDE 5 MG/1
5 TABLET ORAL 2 TIMES DAILY PRN
Qty: 60 TABLET | Refills: 1 | Status: SHIPPED | OUTPATIENT
Start: 2021-10-20 | End: 2022-10-11 | Stop reason: SDUPTHER

## 2021-10-20 RX ORDER — ESCITALOPRAM OXALATE 5 MG/1
TABLET ORAL
Qty: 30 TABLET | Refills: 1 | Status: SHIPPED
Start: 2021-10-20 | End: 2021-12-14 | Stop reason: DRUGHIGH

## 2021-10-20 NOTE — PROGRESS NOTES
10/20/2021    This is a 38 y.o. female   Chief Complaint   Patient presents with    Anxiety     Pt has been dealing with alot of panic attacks on the daily that are lasting up to and posibly over an hour at a time. Pt is struggling really bad with anxiety and has been struggling to control of her emotions. Pt wants to have a pschy eval.     Other     Pt has been taking on and off alot of meds. HPI    Here to discuss mood and anxiety  - about a month ago, stopped working at prior job due to worsening anxiety impacting her job and her driving as well  - working some with her brother-in-law income-wise    No longer on Cymbalta due to lack of improvement    She reports worsening anxiety and panic attacks  - specifically, driving tends to trigger her panic attacks. Driving-induced anxiety has been present for about 6 months but has progressively worsened. Purchased a new vehicle a few weeks ago. Notes that because this is a new significant change it brings stress due to the adjustment. Overall, anxiety has been progressively worsening since losing a loved one a while back. Struggling with emotional regulation and impulse control. More frequently has felt like she is 'snapping' or will fly off the handle and say things and words she doesn't mean. no SI    More recently, is having more of an issue sleeping. Sometimes getting about 5 hours of sleep. Unable to sleep past 7am or so    Moved up to [place] about 45 min from here. Spending some evenings where she commutes to work since it is an hour or so away. A couple of days ago, reached out to [facility] for outpatient assessment.  She is interested in an intensive outpatient program    Review of Systems     Current Outpatient Medications   Medication Sig Dispense Refill    busPIRone (BUSPAR) 5 MG tablet Take 1 tablet by mouth 2 times daily as needed (anxiety) 60 tablet 1    escitalopram (LEXAPRO) 5 MG tablet Take 1/2 tab by mouth for first two weeks, [address] Psychiatry  - External Referral to Psychiatry  - escitalopram (LEXAPRO) 5 MG tablet; Take 1/2 tab by mouth for first two weeks, followed by one tab by mouth daily. Dispense: 30 tablet; Refill: 1    2. Panic attacks  - [address] [place] [address] Psychiatry  - External Referral to Psychiatry  - busPIRone (BUSPAR) 5 MG tablet; Take 1 tablet by mouth 2 times daily as needed (anxiety)  Dispense: 60 tablet; Refill: 1    3. Fibromyalgia    4. History of trauma  - [address] [place] [address] Psychiatry  - External Referral to Psychiatry    Leigh Fisher has had chronic intermittent anxiety and depression with recent worsening anxiety and panic attacks that is impacted her job, ability to drive, and other essential functions. She does not have suicidal ideation  - She is interested in intensive outpatient program and so different referrals were sent in to help set this up.  -We discussed that her combination of symptoms with her history of trauma, fibromyalgia, anxiety, panic attacks are consistent with PTSD. This may be the largest underlying contributing factor although we did discuss there could be other issues present.  -We will start Lexapro and reviewed potential side effects, buspirone as needed. Previously did not have significant improvement on Cymbalta. GeneSight testing today as well.  -She is also interested in counseling and expert mental health evaluation so we will try to set her up with our counselor here.     Time spent: 53 minutes

## 2021-10-20 NOTE — Clinical Note
Roque Diehl  Delmy Isidro is struggling with severe anxiety. She is wanting to get in for intensive outpatient therapy, but in the meantime is very interested in counseling and teasing out her mental health symptoms. I suspect many of her symptoms are related to PTSD and discussed this with her, but I'd very much appreciate your insight.     Thanks,  Noah Cartagena

## 2021-10-21 ENCOUNTER — TELEPHONE (OUTPATIENT)
Dept: BEHAVIORAL/MENTAL HEALTH CLINIC | Age: 41
End: 2021-10-21

## 2021-10-21 NOTE — TELEPHONE ENCOUNTER
Left message offering counseling and providing contact information. Will attempt to reach PARMJIT MCKINNEY [facility] again tomorrow.

## 2021-11-05 ENCOUNTER — TELEPHONE (OUTPATIENT)
Dept: FAMILY MEDICINE CLINIC | Age: 41
End: 2021-11-05

## 2021-11-05 DIAGNOSIS — Z72.0 TOBACCO USE: ICD-10-CM

## 2021-11-05 RX ORDER — NICOTINE 21 MG/24HR
1 PATCH, TRANSDERMAL 24 HOURS TRANSDERMAL EVERY 24 HOURS
Qty: 30 PATCH | Refills: 2 | Status: SHIPPED | OUTPATIENT
Start: 2021-11-05 | End: 2022-09-12 | Stop reason: SDUPTHER

## 2021-11-05 NOTE — TELEPHONE ENCOUNTER
Pt calling stating that she would like to get nicotine patches again to try to quite smocking again. Stated that she has had the patches before and would like to try again. Stated that she has been cutting back on smoking but it is hard. Please Advise.   Pt can be reached at [phone]

## 2021-11-10 ENCOUNTER — OFFICE VISIT (OUTPATIENT)
Dept: BEHAVIORAL/MENTAL HEALTH CLINIC | Age: 41
End: 2021-11-10
Payer: COMMERCIAL

## 2021-11-10 DIAGNOSIS — F41.9 ANXIETY: Primary | ICD-10-CM

## 2021-11-10 PROCEDURE — 4004F PT TOBACCO SCREEN RCVD TLK: CPT

## 2021-11-10 PROCEDURE — 90837 PSYTX W PT 60 MINUTES: CPT

## 2021-11-10 ASSESSMENT — PATIENT HEALTH QUESTIONNAIRE - PHQ9
2. FEELING DOWN, DEPRESSED OR HOPELESS: 1
SUM OF ALL RESPONSES TO PHQ QUESTIONS 1-9: 2
SUM OF ALL RESPONSES TO PHQ9 QUESTIONS 1 & 2: 2
1. LITTLE INTEREST OR PLEASURE IN DOING THINGS: 1

## 2021-11-10 ASSESSMENT — ANXIETY QUESTIONNAIRES
GAD7 TOTAL SCORE: 15
5. BEING SO RESTLESS THAT IT IS HARD TO SIT STILL: 1
2. NOT BEING ABLE TO STOP OR CONTROL WORRYING: 3
7. FEELING AFRAID AS IF SOMETHING AWFUL MIGHT HAPPEN: 3
1. FEELING NERVOUS, ANXIOUS, OR ON EDGE: 3
3. WORRYING TOO MUCH ABOUT DIFFERENT THINGS: 3
6. BECOMING EASILY ANNOYED OR IRRITABLE: 1
4. TROUBLE RELAXING: 1
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
## 2021-11-18 ENCOUNTER — OFFICE VISIT (OUTPATIENT)
Dept: BEHAVIORAL/MENTAL HEALTH CLINIC | Age: 41
End: 2021-11-18
Payer: COMMERCIAL

## 2021-11-18 DIAGNOSIS — F41.9 ANXIETY: Primary | ICD-10-CM

## 2021-11-18 PROCEDURE — 90837 PSYTX W PT 60 MINUTES: CPT

## 2021-11-18 PROCEDURE — 4004F PT TOBACCO SCREEN RCVD TLK: CPT
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
## 2021-11-29 ENCOUNTER — OFFICE VISIT (OUTPATIENT)
Dept: BEHAVIORAL/MENTAL HEALTH CLINIC | Age: 41
End: 2021-11-29
Payer: COMMERCIAL

## 2021-11-29 DIAGNOSIS — F41.9 ANXIETY AND DEPRESSION: Primary | ICD-10-CM

## 2021-11-29 DIAGNOSIS — F32.A ANXIETY AND DEPRESSION: Primary | ICD-10-CM

## 2021-11-29 PROCEDURE — 4004F PT TOBACCO SCREEN RCVD TLK: CPT

## 2021-11-29 PROCEDURE — 90837 PSYTX W PT 60 MINUTES: CPT
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
## 2021-12-09 ENCOUNTER — OFFICE VISIT (OUTPATIENT)
Dept: BEHAVIORAL/MENTAL HEALTH CLINIC | Age: 41
End: 2021-12-09
Payer: COMMERCIAL

## 2021-12-09 DIAGNOSIS — F41.9 ANXIETY: Primary | ICD-10-CM

## 2021-12-09 PROCEDURE — 4004F PT TOBACCO SCREEN RCVD TLK: CPT

## 2021-12-09 PROCEDURE — 90837 PSYTX W PT 60 MINUTES: CPT

## 2021-12-09 ASSESSMENT — ANXIETY QUESTIONNAIRES
4. TROUBLE RELAXING: 1
2. NOT BEING ABLE TO STOP OR CONTROL WORRYING: 2
IF YOU CHECKED OFF ANY PROBLEMS ON THIS QUESTIONNAIRE, HOW DIFFICULT HAVE THESE PROBLEMS MADE IT FOR YOU TO DO YOUR WORK, TAKE CARE OF THINGS AT HOME, OR GET ALONG WITH OTHER PEOPLE: SOMEWHAT DIFFICULT
3. WORRYING TOO MUCH ABOUT DIFFERENT THINGS: 2
GAD7 TOTAL SCORE: 12
7. FEELING AFRAID AS IF SOMETHING AWFUL MIGHT HAPPEN: 3
6. BECOMING EASILY ANNOYED OR IRRITABLE: 1
1. FEELING NERVOUS, ANXIOUS, OR ON EDGE: 2
5. BEING SO RESTLESS THAT IT IS HARD TO SIT STILL: 1
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
## 2021-12-14 ENCOUNTER — TELEMEDICINE (OUTPATIENT)
Dept: FAMILY MEDICINE CLINIC | Age: 41
End: 2021-12-14
Payer: COMMERCIAL

## 2021-12-14 DIAGNOSIS — F32.A ANXIETY AND DEPRESSION: Primary | ICD-10-CM

## 2021-12-14 DIAGNOSIS — Z87.828 HISTORY OF TRAUMA: ICD-10-CM

## 2021-12-14 DIAGNOSIS — F41.9 ANXIETY AND DEPRESSION: Primary | ICD-10-CM

## 2021-12-14 DIAGNOSIS — F41.0 PANIC: ICD-10-CM

## 2021-12-14 PROCEDURE — 99214 OFFICE O/P EST MOD 30 MIN: CPT | Performed by: FAMILY MEDICINE

## 2021-12-14 PROCEDURE — G8427 DOCREV CUR MEDS BY ELIG CLIN: HCPCS | Performed by: FAMILY MEDICINE

## 2021-12-14 RX ORDER — PROPRANOLOL HYDROCHLORIDE 20 MG/1
20 TABLET ORAL 3 TIMES DAILY PRN
Qty: 90 TABLET | Refills: 0 | Status: SHIPPED | OUTPATIENT
Start: 2021-12-14

## 2021-12-14 RX ORDER — ESCITALOPRAM OXALATE 10 MG/1
10 TABLET ORAL DAILY
Qty: 90 TABLET | Refills: 1 | Status: SHIPPED | OUTPATIENT
Start: 2021-12-14 | End: 2022-06-14

## 2021-12-14 NOTE — PROGRESS NOTES
12/14/2021    This is a 42 y.o. female   Chief Complaint   Patient presents with    Anxiety     go over genesight test, discuss meds     HPI    Virtual visit to follow up for anxiety, history of trauma, fibromyalgia. She has established with our in-house counselor and has found this beneficial.   - her main persistent symptom is anxiety. Currently on Lexapro 5mg. She felt like this was helping some at first but that it is not as effective as it originally was. - has had about 4 times that she has needed to take the Buspar as needed medication for panic. Main area that has been challenging for her has been driving which induces panic - it can occur when driving (worse this way) but also present as a passenger. Struggling with sleep again recently    Boyfriend/partner is having surgery so some added stress and responsibility on her    Review of Systems     Current Outpatient Medications   Medication Sig Dispense Refill    escitalopram (LEXAPRO) 10 MG tablet Take 1 tablet by mouth daily 90 tablet 1    propranolol (INDERAL) 20 MG tablet Take 1 tablet by mouth 3 times daily as needed (anxiety) 90 tablet 0    nicotine (NICODERM CQ) 14 MG/24HR Place 1 patch onto the skin every 24 hours 30 patch 2    busPIRone (BUSPAR) 5 MG tablet Take 1 tablet by mouth 2 times daily as needed (anxiety) 60 tablet 1    naproxen (NAPROSYN) 500 MG tablet Take 1 tablet by mouth 2 times daily (with meals) 60 tablet 2    gabapentin (NEURONTIN) 600 MG tablet Take 1 tablet by mouth 3 times daily for 90 days. 90 tablet 2    Potassium 99 MG TABS Take by mouth daily      Magnesium 500 MG CAPS Take by mouth daily      Ascorbic Acid (VITAMIN C) 500 MG CAPS Take by mouth daily      Nutritional Supplements (HIGH-PROTEIN NUTRITIONAL SHAKE PO) Take by mouth [employer] shake daily.       melatonin 3 MG TABS tablet Take 3 mg by mouth daily      verapamil (CALAN SR) 180 MG extended release tablet Take 1 tablet by mouth daily (Patient not taking: Reported on 12/14/2021) 90 tablet 1    omeprazole (PRILOSEC) 20 MG delayed release capsule Take 1 capsule by mouth daily (Patient not taking: Reported on 10/20/2021) 30 capsule 0    albuterol sulfate HFA (VENTOLIN HFA) 108 (90 Base) MCG/ACT inhaler Inhale 2 puffs into the lungs 4 times daily as needed for Wheezing (Patient not taking: Reported on 12/14/2021) 1 Inhaler 5    tiZANidine (ZANAFLEX) 2 MG tablet Take 1 tablet by mouth nightly as needed (neck pain) (Patient not taking: Reported on 12/14/2021) 10 tablet 0    docusate sodium (COLACE) 100 MG capsule Take 100 mg by mouth 2 times daily as needed  (Patient not taking: Reported on 12/14/2021)      senna (SENOKOT) 8.6 MG tablet Take 1 tablet by mouth daily (Patient not taking: Reported on 12/14/2021)      acetaminophen (APAP EXTRA STRENGTH) 500 MG tablet Take 2 tablets by mouth every 6 hours as needed for Pain DO NOT TAKE WITH OTHER MEDICATIONS CONTAINING ACETAMINOPHEN. (Patient not taking: Reported on 12/14/2021) 30 tablet 0     No current facility-administered medications for this visit. There were no vitals taken for this visit. Physical Exam    Wt Readings from Last 3 Encounters:   10/20/21 138 lb (62.6 kg)   07/12/21 130 lb (59 kg)   07/07/21 128 lb (58.1 kg)     BP Readings from Last 3 Encounters:   10/20/21 115/78   07/12/21 100/60   07/07/21 (!) 96/56     Assessment/Plan:  1. Anxiety and depression  - escitalopram (LEXAPRO) 10 MG tablet; Take 1 tablet by mouth daily  Dispense: 90 tablet; Refill: 1    2. Panic  - propranolol (INDERAL) 20 MG tablet; Take 1 tablet by mouth 3 times daily as needed (anxiety)  Dispense: 90 tablet; Refill: 0    3. History of trauma    Mixed picture of anxiety, some tendency toward concentration difficulty and ?hypomania in the past, prior trauma as well. Now with panic specifically with driving. buspar becoming less helpful. Can take propranolol PRN to help blunt the adrenaline response. Reviewed GeneSight testing. Increase Lexapro to 10mg  Continue therapy   She will consider looking into outpatient program with [facility] and see what their wait list is so there is a back up plan if not improving early next year. Carlos Gross, was evaluated through a synchronous (real-time) audio-video encounter. The patient (or guardian if applicable) is aware that this is a billable service. Verbal consent to proceed has been obtained within the past 12 months. The visit was conducted pursuant to the emergency declaration under the [address] [place] [address], [address] [place] [address] authority and the [employer] and [email or web address] General Act. Patient identification was verified, and a caregiver was present when appropriate. The patient was located in a state where the provider was credentialed to provide care. Total time spent for this encounter: 30 minutes    --Francisco Braun MD on 12/14/2021 at 12:35 PM    An electronic signature was used to authenticate this note.

## 2021-12-20 ENCOUNTER — TELEMEDICINE (OUTPATIENT)
Dept: BEHAVIORAL/MENTAL HEALTH CLINIC | Age: 41
End: 2021-12-20
Payer: COMMERCIAL

## 2021-12-20 DIAGNOSIS — F41.9 ANXIETY: Primary | ICD-10-CM

## 2021-12-20 PROCEDURE — 90832 PSYTX W PT 30 MINUTES: CPT

## 2021-12-20 PROCEDURE — 4004F PT TOBACCO SCREEN RCVD TLK: CPT
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
## 2022-01-19 DIAGNOSIS — M54.2 CHRONIC NECK PAIN: ICD-10-CM

## 2022-01-19 DIAGNOSIS — M48.02 FORAMINAL STENOSIS OF CERVICAL REGION: ICD-10-CM

## 2022-01-19 DIAGNOSIS — G89.29 CHRONIC NECK PAIN: ICD-10-CM

## 2022-01-19 RX ORDER — NAPROXEN 500 MG/1
500 TABLET ORAL 2 TIMES DAILY WITH MEALS
Qty: 60 TABLET | Refills: 2 | Status: SHIPPED | OUTPATIENT
Start: 2022-01-19 | End: 2022-06-17

## 2022-01-20 ENCOUNTER — OFFICE VISIT (OUTPATIENT)
Dept: BEHAVIORAL/MENTAL HEALTH CLINIC | Age: 42
End: 2022-01-20
Payer: COMMERCIAL

## 2022-01-20 DIAGNOSIS — F32.A ANXIETY AND DEPRESSION: Primary | ICD-10-CM

## 2022-01-20 DIAGNOSIS — F41.9 ANXIETY AND DEPRESSION: Primary | ICD-10-CM

## 2022-01-20 PROCEDURE — 90837 PSYTX W PT 60 MINUTES: CPT

## 2022-01-20 PROCEDURE — 4004F PT TOBACCO SCREEN RCVD TLK: CPT
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
## 2022-01-27 ENCOUNTER — OFFICE VISIT (OUTPATIENT)
Dept: BEHAVIORAL/MENTAL HEALTH CLINIC | Age: 42
End: 2022-01-27
Payer: COMMERCIAL

## 2022-01-27 DIAGNOSIS — F32.A ANXIETY AND DEPRESSION: Primary | ICD-10-CM

## 2022-01-27 DIAGNOSIS — F41.9 ANXIETY AND DEPRESSION: Primary | ICD-10-CM

## 2022-01-27 PROCEDURE — 4004F PT TOBACCO SCREEN RCVD TLK: CPT

## 2022-01-27 PROCEDURE — 90837 PSYTX W PT 60 MINUTES: CPT

## 2022-01-31 ENCOUNTER — TELEMEDICINE (OUTPATIENT)
Dept: BEHAVIORAL/MENTAL HEALTH CLINIC | Age: 42
End: 2022-01-31
Payer: COMMERCIAL

## 2022-01-31 DIAGNOSIS — F32.A ANXIETY AND DEPRESSION: Primary | ICD-10-CM

## 2022-01-31 DIAGNOSIS — F41.9 ANXIETY AND DEPRESSION: Primary | ICD-10-CM

## 2022-01-31 PROCEDURE — 4004F PT TOBACCO SCREEN RCVD TLK: CPT

## 2022-01-31 PROCEDURE — 90837 PSYTX W PT 60 MINUTES: CPT
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
## 2022-02-02 ENCOUNTER — TELEMEDICINE (OUTPATIENT)
Dept: BEHAVIORAL/MENTAL HEALTH CLINIC | Age: 42
End: 2022-02-02
Payer: COMMERCIAL

## 2022-02-02 DIAGNOSIS — F41.9 ANXIETY: Primary | ICD-10-CM

## 2022-02-02 PROCEDURE — 90834 PSYTX W PT 45 MINUTES: CPT

## 2022-02-02 PROCEDURE — 4004F PT TOBACCO SCREEN RCVD TLK: CPT

## 2022-02-08 ENCOUNTER — TELEMEDICINE (OUTPATIENT)
Dept: BEHAVIORAL/MENTAL HEALTH CLINIC | Age: 42
End: 2022-02-08
Payer: COMMERCIAL

## 2022-02-08 DIAGNOSIS — F41.9 ANXIETY: Primary | ICD-10-CM

## 2022-02-08 PROCEDURE — 4004F PT TOBACCO SCREEN RCVD TLK: CPT

## 2022-02-08 PROCEDURE — 90834 PSYTX W PT 45 MINUTES: CPT

## 2022-02-08 NOTE — PROGRESS NOTES
[facility]  [facility]    Date of Service:  2/8/2022  1:05-1:50pm    Summary for PCP: Sustained progress with driving anxiety and with emotional regulation. Couples work as next step in addressing trauma, secure attachment & relationship goals. Presenting Concerns:  Margy Fontaine is a 42 y.o. who was referred by her primary care physician, Sharla Berger to concerns about depression and anxiety. Today Vish Olguin was evaluated through a synchronous (real-time) audio-video encounter. The patient (or guardian if applicable) is aware that this is a billable service. Verbal consent to proceed has been obtained within the past 12 months. The visit was conducted pursuant to the emergency declaration under the [address] [place] [address], [address] [place] [address] authority and the [employer] and [employer] Act. Patient identification was verified, and a caregiver was present when appropriate. The patient was located in a state where the provider was credentialed to provide care. Vish Olguin reported progress in her steps with parenting and driving anxiety. \"Feeling ok with this for now. \"  Regulating emotions well. Demonstrated ability to balance thoughts, feelings, and actions as she makes decisions and navigates her days. See goal-directed progress in these concerns in \"Progress in Treatment\" section below. Initially Reported Symptoms:  Panic attacks, anxiety when driving or riding in car, feelings of anxiousness,  Feeling disconnected from reality, strong need for routine/predictability. Specific perfectionistic in how to complete tasks. Compulsions. Trouble falling asleep, staying asleep, early waking. Racing thoughts during night waking. Avg 5 hrs of sleep/night. Other times goes to bed early and sleeps a lot. Reduced appetite during day, then snacks on \"junkfood\" all evening.  30 lb unintentional weight loss within a year. Hyper-focused, talks faster and louder over people. \"I get worked up. \" (Lasts 48 hrs)  Depressed feelings in day-to-day. Less severe than highs; still able to function during depression. Worries about:  Kids, housing, finances, being accepted by fiance and family. \"Waiting for next bomb to drop. \"     \"Always in survival mode. \"      Previous behavioral health treatment history: Therapy in teen years while in foster care. Diagnosed with Bipolar Disorder. Family psychiatric history: Father Paranoid Schizophrenia; all 3 of Beth's children diagnosed with ADHD. Half-sister Down's Syndrome. Siblings and mother psychiatric history unknown. Administered PHQ-2 on 11/10/21 with score of 2. PHQ Scores 11/10/2021 6/14/2019   PHQ2 Score 2 2   PHQ9 Score 2 2   Interpretation of Total Score Depression Severity: 1-4 = Minimal depression, 5-9 = Mild depression, 10-14 = Moderate depression, 15-19 = Moderately severe depression, 20-27 = Severe depression  BETSEY-7 on 11/10/21 score 15, on 12/9/21 score 12. For Bipolar Disorder criteria assessed history and current. Ascribed to 5 of 8 depressive symptoms and 5 of 7 symptoms of yannick. Reema Winter remembers short stretches with manic symptoms, none lasting for several days. At this time symptoms seem to fit ADHD hyperactivity traits more than BD manic episodes. Mental Status:  Appearance: within normal Limits   Affect:  consistent with expectations based upon mood   Attitude: Cooperative   Mood:   Calm   Thought Process:  goal directed   Delusions: no evidence of delusions   Perceptions: No perceptual disturbance   Behavior:   Engaged in session   Psychomotor: Within normal limits   Speech:    Within normal limits   Eye Contact: good   Orientation:  oriented to person, place, time, and general circumstances   Judgment & Insight:  normal insight and judgment     Risk Assessment:  Current Suicide Risk:  no suicidal ideation, nonsuicidal morbid ideation  Current Homicide Risk:  no homocidal ideation  Olya Garcia reported no previous history of suicidal ideation or behavior. Diagnosis: Anxiety, moderate to severe  ADHD, Combined Type  Added 11/18/21  Post-traumatic stress symptoms  Provisional: Bipolar II Disorder if more information gained. Not quite at threshold. Initial Assessment, Impressions and Plan:  Olya Garcia is a 42 y.o. old female who presents with moderate to severe anxiety symptoms that are interfering with her work, family and social functioning. Post-traumatic symptoms and attachment wounds also affect current functioning. Olya Garcia is sub-threshold for Bipolar II Disorder; more information to be gained. Will benefit from DBT to reduce anxiety symptoms, stabilize emotional regulation, and EFT to process trauma and establish secure attachment with self and others. Progress in Treatment:   2/8/22 Focused on goal reduce anxiety symptoms: Reviewed progress and current functioning with driving anxiety. Identified what is working. Focused on goal emotional regulation: Reviewed progress and current functioning with adaptive coping and regulation. Provided perspective and affirmation with growth. Focused on goal process trauma: Placed current challenges, emotions, and concerns in context of past trauma themes. Engaged Olya Garcia in activity for awareness of past vs present responses. Provided reflective listening for Beth's reflection on differences in her responses. Focused on goal establish secure attachment: Gave referral resources for couples counseling, sketched negative cycle of interaction, used conjecture and empathy to facilitate awareness of pattern and needs during conflict. Increased sense of ability for healthy relationship. Contact Shanita Milligan  at this office as needed.

## 2022-02-09 NOTE — PROGRESS NOTES
Behavioral Health Note  [facility]    Date of Service:  2/2/2022  11:10-11:55am    Summary for PCP: Driving ability continues. Growing ability to remain grounded during distressing situations. Gaining confidence in role as parent. Wants to keep focused on counseling goals. Today Shayy Monroy was evaluated through a synchronous (real-time) audio-video encounter. The patient (or guardian if applicable) is aware that this is a billable service. Verbal consent to proceed has been obtained within the past 12 months. The visit was conducted pursuant to the emergency declaration under the [facility], [address] [place] [address] authority and the [employer] General Act. Patient identification was verified, and a caregiver was present when appropriate. The patient was located in a state where the provider was credentialed to provide care. Patient in [facility]. Shayy Monroy reported she is keeping same sleeping schedule and has been sleeping ok. Felt interrupted and talked over, given jacob parenting advice during school meeting for son. Did not allow internal emotional disruption at this. Has reached out to local resources for her son. Driving anxiety overall improving. Not \"freaking out\" although partner is accusing her of it. \"I feel I'm staying pretty grounded. \"  See goal-directed progress in these concerns in \"Progress in Treatment\" section below. Presenting Concerns:  Nikky Lucas is a 42 y.o. who was referred by her primary care physician, Sharla Zamudio to concerns about depression and anxiety. Initially Reported Symptoms:  Panic attacks, anxiety when driving or riding in car, feelings of anxiousness,  Feeling disconnected from reality, strong need for routine/predictability. Specific perfectionistic in how to complete tasks. Compulsions. Trouble falling asleep, staying asleep, early waking. Racing thoughts during night waking. Avg 5 hrs of sleep/night. Other times goes to bed early and sleeps a lot. Reduced appetite during day, then snacks on \"junkfood\" all evening. 30 lb unintentional weight loss within a year. Hyper-focused, talks faster and louder over people. \"I get worked up. \" (Lasts 48 hrs)  Depressed feelings in day-to-day. Less severe than highs; still able to function during depression. Worries about:  Kids, housing, finances, being accepted by fiance and family. \"Waiting for next bomb to drop. \"     \"Always in survival mode. \"      Previous behavioral health treatment history: Therapy in teen years while in foster care. Diagnosed with Bipolar Disorder. Family psychiatric history: Father Paranoid Schizophrenia; all 3 of Beth's children diagnosed with ADHD. Half-sister Down's Syndrome. Siblings and mother psychiatric history unknown. Administered PHQ-2 on 11/10/21 with score of 2. PHQ Scores 11/10/2021 6/14/2019   PHQ2 Score 2 2   PHQ9 Score 2 2   Interpretation of Total Score Depression Severity: 1-4 = Minimal depression, 5-9 = Mild depression, 10-14 = Moderate depression, 15-19 = Moderately severe depression, 20-27 = Severe depression  BETSEY-7 on 11/10/21 score 15, on 12/9/21 score 12. For Bipolar Disorder criteria assessed history and current. Ascribed to 5 of 8 depressive symptoms and 5 of 7 symptoms of yannick. Arlene Bath remembers short stretches with manic symptoms, none lasting for several days. At this time symptoms seem to fit ADHD hyperactivity traits more than BD manic episodes. Mental Status:  Appearance: within normal Limits   Affect:  consistent with expectations based upon mood   Attitude: Cooperative   Mood:   Concerned but calm. Thought Process:  goal directed   Delusions: no evidence of delusions   Perceptions: No perceptual disturbance   Behavior:   Engaged in session   Psychomotor: Within normal limits   Speech:    Within normal limits   Eye Contact: good   Orientation:  oriented to person, place, time, and general circumstances   Judgment & Insight:  normal insight and judgment     Risk Assessment:  Current Suicide Risk:  no suicidal ideation, nonsuicidal morbid ideation  Current Homicide Risk:  no homocidal ideation  Shayy Monroy reported no previous history of suicidal ideation or behavior. Diagnosis: Anxiety, moderate to severe  ADHD, Combined Type  Added 11/18/21  Post-traumatic stress symptoms    Initial Assessment, Impressions and Plan:  Shayy Monroy is a 42 y.o. old female who presents with moderate to severe anxiety symptoms that are interfering with her work, family and social functioning. Post-traumatic symptoms and attachment wounds also affect current functioning. Shayy Monroy is sub-threshold for Bipolar II Disorder; more information to be gained. Will benefit from DBT to reduce anxiety symptoms, stabilize emotional regulation, and EFT to process trauma and establish secure attachment with self and others. Progress in Treatment:   2/2/22 Focused on goal reduce anxiety symptoms: Reviewed driving anxiety reduction, ability, and success. Looked at sleep patterns. Focused on goal emotional regulation: Explored new feelings of \"grounded. \" Identified responses that allow \"not freaking out\" when conflict or attachment injuries occur. Focused on goal process trauma: This goal not addressed today. Focused on goal establish secure attachment: Identified progress with parenting relationship. Looked at [address] has pursued and secured. Identified support from family. Provided reflective listening for Beth's reflections on her partner's communication style and effects on her. Schedule for next week. Contact Shanita Liang  at this office as needed.

## 2022-02-18 ENCOUNTER — OFFICE VISIT (OUTPATIENT)
Dept: BEHAVIORAL/MENTAL HEALTH CLINIC | Age: 42
End: 2022-02-18
Payer: COMMERCIAL

## 2022-02-18 DIAGNOSIS — F32.A ANXIETY AND DEPRESSION: Primary | ICD-10-CM

## 2022-02-18 DIAGNOSIS — F41.9 ANXIETY AND DEPRESSION: Primary | ICD-10-CM

## 2022-02-18 PROCEDURE — 4004F PT TOBACCO SCREEN RCVD TLK: CPT

## 2022-02-18 PROCEDURE — 90837 PSYTX W PT 60 MINUTES: CPT

## 2022-02-25 ENCOUNTER — OFFICE VISIT (OUTPATIENT)
Dept: BEHAVIORAL/MENTAL HEALTH CLINIC | Age: 42
End: 2022-02-25
Payer: COMMERCIAL

## 2022-02-25 DIAGNOSIS — F32.A ANXIETY AND DEPRESSION: Primary | ICD-10-CM

## 2022-02-25 DIAGNOSIS — F41.9 ANXIETY AND DEPRESSION: Primary | ICD-10-CM

## 2022-02-25 PROCEDURE — 90837 PSYTX W PT 60 MINUTES: CPT

## 2022-02-25 PROCEDURE — 4004F PT TOBACCO SCREEN RCVD TLK: CPT

## 2022-02-26 NOTE — PROGRESS NOTES
Behavioral Health Note  [facility]    Date of Service:  2/18/2022  1:00-2:00pm    Summary for PCP: worked with Esmer David and partner on Beth's goals today. Presenting Concerns:  Alejo Salas is a 42 y.o. who was referred by her primary care physician, Sharla Wilson to concerns about depression and anxiety. Today Beth's partner was present at her request.   Couple focused on relationship patterns and decisions that directly affect Beth's progress in emotional regulation, housing, employment plans, and daily needs for structure. Esmer David reports continued ability to drive with minimal anxiety interference. Eating and sleep are ok. See goal-directed progress in these concerns in \"Progress in Treatment\" section below. Initially Reported Symptoms:  Panic attacks, anxiety when driving or riding in car, feelings of anxiousness,  Feeling disconnected from reality, strong need for routine/predictability. Specific perfectionistic in how to complete tasks. Compulsions. Trouble falling asleep, staying asleep, early waking. Racing thoughts during night waking. Avg 5 hrs of sleep/night. Other times goes to bed early and sleeps a lot. Reduced appetite during day, then snacks on \"junkfood\" all evening. 30 lb unintentional weight loss within a year. Hyper-focused, talks faster and louder over people. \"I get worked up. \" (Lasts 48 hrs)  Depressed feelings in day-to-day. Less severe than highs; still able to function during depression. Worries about:  Kids, housing, finances, being accepted by fiance and family. \"Waiting for next bomb to drop. \"     \"Always in survival mode. \"      Previous behavioral health treatment history: Therapy in teen years while in foster care. Diagnosed with Bipolar Disorder. Family psychiatric history: Father Paranoid Schizophrenia; all 3 of Beth's children diagnosed with ADHD. Half-sister Down's Syndrome.   Siblings and mother psychiatric history unknown. Administered PHQ-2 on 11/10/21 with score of 2. PHQ Scores 11/10/2021 6/14/2019   PHQ2 Score 2 2   PHQ9 Score 2 2   Interpretation of Total Score Depression Severity: 1-4 = Minimal depression, 5-9 = Mild depression, 10-14 = Moderate depression, 15-19 = Moderately severe depression, 20-27 = Severe depression  BETSEY-7 on 11/10/21 score 15, on 12/9/21 score 12. For Bipolar Disorder criteria assessed history and current. Ascribed to 5 of 8 depressive symptoms and 5 of 7 symptoms of yannick. Anila William remembers short stretches with manic symptoms, none lasting for several days. At this time symptoms seem to fit ADHD hyperactivity traits more than BD manic episodes. Mental Status:  Appearance: within normal Limits   Affect:  consistent with expectations based upon mood   Attitude: Cooperative   Mood:   Calm   Thought Process:  goal directed   Delusions: no evidence of delusions   Perceptions: No perceptual disturbance   Behavior:   Engaged in session   Psychomotor: Within normal limits   Speech: Within normal limits   Eye Contact: good   Orientation:  oriented to person, place, time, and general circumstances   Judgment & Insight:  normal insight and judgment     Risk Assessment:  Current Suicide Risk:  no suicidal ideation, nonsuicidal morbid ideation  Current Homicide Risk:  no homocidal ideation  Anila William reported no previous history of suicidal ideation or behavior. Diagnosis: Anxiety, moderate to severe  ADHD, Combined Type  Added 11/18/21  Post-traumatic stress symptoms  Provisional: Bipolar II Disorder if more information gained. Not quite at threshold. Initial Assessment, Impressions and Plan:  Anila William is a 42 y.o. old female who presents with moderate to severe anxiety symptoms that are interfering with her work, family and social functioning. Post-traumatic symptoms and attachment wounds also affect current functioning.  Anila William is sub-threshold for Bipolar II Disorder; more information to be gained. Will benefit from DBT to reduce anxiety symptoms, stabilize emotional regulation, and EFT to process trauma and establish secure attachment with self and others. Progress in Treatment:   2/18/22 Focused on goal reduce anxiety symptoms: Reinforced specific progress with driving, sleeping, and daily routine for emotional regulation. Gained information on partner's history and attachment patterns; Facilitated enactment with partners to express understanding of own and partner needs. Focused on goal emotional regulation: Identified differences when living separate from partner and with partner. , then re-integrated sense of self from sense of self in relationship. Focused on goal process trauma: Did not address this goal today. Focused on goal establish secure attachment: Increased awareness of healthy vs harmful behaviors in close relationship. Identified positive traits Elinor Harding gained from her family experience. Assigned relationship behaviors for 1 wk. Contact Shanita Nance  at this office as needed.

## 2022-03-01 ENCOUNTER — OFFICE VISIT (OUTPATIENT)
Dept: BEHAVIORAL/MENTAL HEALTH CLINIC | Age: 42
End: 2022-03-01
Payer: COMMERCIAL

## 2022-03-01 DIAGNOSIS — F41.9 ANXIETY AND DEPRESSION: Primary | ICD-10-CM

## 2022-03-01 DIAGNOSIS — F32.A ANXIETY AND DEPRESSION: Primary | ICD-10-CM

## 2022-03-01 PROCEDURE — 90834 PSYTX W PT 45 MINUTES: CPT

## 2022-03-01 PROCEDURE — 4004F PT TOBACCO SCREEN RCVD TLK: CPT

## 2022-03-01 NOTE — PROGRESS NOTES
Behavioral Health Note  [facility]    Date of Service:  2/25/2022  1:00-2:00pm    Summary for PCP: Selvin Mane is showing healthy focus on her emotional and growth needs. Today she clarified her goals and intentions for herself. Presenting Concerns:  Blake Ramos is a 42 y.o. who was referred by her primary care physician, Sharla Monroy to concerns about depression and anxiety. Today Beth's partner was present at her request.   Each partner identified specific steps and goals for future decisions. Therapist placed in context of Beth's mood disorder symptoms and needs. Couple practiced communication skills. Further communication strategy agreed on. Therapist provided resource referrals for Partner's mental and emotional health needs. Selvin Mane and her Partner identified they have conflicting needs and plans at this time. Selvin Mane identified desire to focus on her personal immediate emotional/mental/housing needs regardless. See goal-directed progress in these concerns in \"Progress in Treatment\" section below. Initially Reported Symptoms:  Panic attacks, anxiety when driving or riding in car, feelings of anxiousness,  Feeling disconnected from reality, strong need for routine/predictability. Specific perfectionistic in how to complete tasks. Compulsions. Trouble falling asleep, staying asleep, early waking. Racing thoughts during night waking. Avg 5 hrs of sleep/night. Other times goes to bed early and sleeps a lot. Reduced appetite during day, then snacks on \"junkfood\" all evening. 30 lb unintentional weight loss within a year. Hyper-focused, talks faster and louder over people. \"I get worked up. \" (Lasts 48 hrs)  Depressed feelings in day-to-day. Less severe than highs; still able to function during depression. Worries about:  Kids, housing, finances, being accepted by fiance and family. \"Waiting for next bomb to drop. \"     \"Always in survival mode. \"      Previous behavioral health treatment history: Therapy in teen years while in foster care. Diagnosed with Bipolar Disorder. Family psychiatric history: Father Paranoid Schizophrenia; all 3 of Beth's children diagnosed with ADHD. Half-sister Down's Syndrome. Siblings and mother psychiatric history unknown. Administered PHQ-2 on 11/10/21 with score of 2. PHQ Scores 11/10/2021 6/14/2019   PHQ2 Score 2 2   PHQ9 Score 2 2   Interpretation of Total Score Depression Severity: 1-4 = Minimal depression, 5-9 = Mild depression, 10-14 = Moderate depression, 15-19 = Moderately severe depression, 20-27 = Severe depression  BETSEY-7 on 11/10/21 score 15, on 12/9/21 score 12. For Bipolar Disorder criteria assessed history and current. Ascribed to 5 of 8 depressive symptoms and 5 of 7 symptoms of yannick. Arlene Lara remembers short stretches with manic symptoms, none lasting for several days. At this time symptoms seem to fit ADHD hyperactivity traits more than BD manic episodes. Mental Status:  Appearance: within normal Limits   Affect:  consistent with expectations based upon mood   Attitude: Cooperative   Mood:   Calm   Thought Process:  goal directed   Delusions: no evidence of delusions   Perceptions: No perceptual disturbance   Behavior:   Engaged in session   Psychomotor: Within normal limits   Speech: Within normal limits   Eye Contact: good   Orientation:  oriented to person, place, time, and general circumstances   Judgment & Insight:  normal insight and judgment     Risk Assessment:  Current Suicide Risk:  no suicidal ideation, nonsuicidal morbid ideation  Current Homicide Risk:  no homocidal ideation  Arlene Marco reported no previous history of suicidal ideation or behavior. Diagnosis: Anxiety, moderate to severe  ADHD, Combined Type  Added 11/18/21  Post-traumatic stress symptoms  Provisional: Bipolar II Disorder if more information gained. Not quite at threshold.      Initial Assessment, Impressions and Plan:  Kelsi Vargas is a 42 y.o. old female who presents with moderate to severe anxiety symptoms that are interfering with her work, family and social functioning. Post-traumatic symptoms and attachment wounds also affect current functioning. Kelsi Vargas is sub-threshold for Bipolar II Disorder; more information to be gained. Will benefit from DBT to reduce anxiety symptoms, stabilize emotional regulation, and EFT to process trauma and establish secure attachment with self and others. Progress in Treatment:   2/18/22 Focused on goal reduce anxiety symptoms: Gopal Christianson and Partner in activity to identify needs and wants. Placed past anxious time in context of unmet needs. Looked at healthy ways to meet needs. Provided referral info to Partner to ensure focus on Beth's health. Focused on goal emotional regulation: Identified growth and progress in emotional regulation. Affirmed adaptive coping skills now in place. Focused on goal process trauma: Increased awareness of core needs, placed in context of development. Accessed Beth's strengths and applied to history and current needs. Focused on goal establish secure attachment: Communication skills practice. Ongoing communication strategy. Facilitated identification of wants and needs in relationship. Contact Shanita Palma  at this office as needed.

## 2022-03-02 NOTE — PROGRESS NOTES
[facility]    Date of Service:  3/1/2022  1:00-1:55pm    Summary for PCP: Looked at stress levels and functioning, celebrated growth in self-regulation and healthy coping, chose to create template for daily structure. Established priorities in parenting and partner relationship. Presenting Concerns:  Gloria Chaudhry is a 42 y.o. who was referred by her primary care physician, Ottoniel Coronado, [place] to concerns about depression and anxiety. Today Tristan Hughes reported recent therapy sessions with partner have helped with her internal work and with decisions. Positive times with partner as they practiced assigned behaviors. Troubling dreams, able in session to identify themes and gain self-awareness. Recognized delay in progress toward housing/job needs; prioritized and made preliminary decisions in session. Much more able to establish structure, plan, and self-regulate than last fall. Still prefers familiar environment but now still able to drive without distress. See goal-directed progress in these concerns in \"Progress in Treatment\" section below. Initially Reported Symptoms:  Panic attacks, anxiety when driving or riding in car, feelings of anxiousness,  Feeling disconnected from reality, strong need for routine/predictability. Specific perfectionistic in how to complete tasks. Compulsions. Trouble falling asleep, staying asleep, early waking. Racing thoughts during night waking. Avg 5 hrs of sleep/night. Other times goes to bed early and sleeps a lot. Reduced appetite during day, then snacks on \"junkfood\" all evening. 30 lb unintentional weight loss within a year. Hyper-focused, talks faster and louder over people. \"I get worked up. \" (Lasts 48 hrs)  Depressed feelings in day-to-day. Less severe than highs; still able to function during depression. Worries about:  Kids, housing, finances, being accepted by fiance and family.   \"Waiting information gained. Not quite at threshold. Initial Assessment, Impressions and Plan:  Cayla Ontiveros is a 42 y.o. old female who presents with moderate to severe anxiety symptoms that are interfering with her work, family and social functioning. Post-traumatic symptoms and attachment wounds also affect current functioning. Cayla Ontiveros is sub-threshold for Bipolar II Disorder; more information to be gained. Will benefit from DBT to reduce anxiety symptoms, stabilize emotional regulation, and EFT to process trauma and establish secure attachment with self and others. Progress in Treatment:   3/1/22 Focused on goal reduce anxiety symptoms: Engaged Beth in [place] stress curve activity as fresh lens for viewing functioning, stress, anxiety, and wellness. Facilitated attention on what is going well, on differences in responses to stressors compared to 4 months ago. Focused on goal emotional regulation: focused on acceptance of uncertainty, normalized anxiety as energy to meet challenges. Engaged Cayla Ontiveros in [place] and goal planning Jolie Ramirez chose weekly electronic template, printed and posted). Collaborate to choose 2 short-term goals for this week. Focused on goal process trauma: Provided reflective listening for Beth's troubling dreams; facilitated processing and themes. Normalized troubling dreams when growth occurring and issues surface for healing. Focused on goal establish secure attachment: Provided reflective listening for Beth's prioritizing of goals and values. In context of partner relationship, relationship with self, and parenting role. Contact Shanita Lacy  at this office as needed.

## 2022-03-08 ENCOUNTER — OFFICE VISIT (OUTPATIENT)
Dept: BEHAVIORAL/MENTAL HEALTH CLINIC | Age: 42
End: 2022-03-08
Payer: COMMERCIAL

## 2022-03-08 DIAGNOSIS — F32.A ANXIETY AND DEPRESSION: Primary | ICD-10-CM

## 2022-03-08 DIAGNOSIS — F41.9 ANXIETY AND DEPRESSION: Primary | ICD-10-CM

## 2022-03-08 PROCEDURE — 4004F PT TOBACCO SCREEN RCVD TLK: CPT

## 2022-03-08 PROCEDURE — 90837 PSYTX W PT 60 MINUTES: CPT

## 2022-03-14 NOTE — PROGRESS NOTES
Behavioral Health Note  [facility]    Date of Service:  3/8/2022  1:00-2:00pm    Summary for PCP: Beth's view of her ability to self-regulate and take needed steps has grown. Presenting Concerns:  Gloria Chaudhry is a 42 y.o. who was referred by her primary care physician, Sharla Hernández to concerns about depression and anxiety. Today Tristan Hughes requested her partner be present in session. Each reported gaining hope for their relationship as they see self and partner make progress. Each communicated decision to remain in relationship. Decided to take trip with friends as couple for a rest time. Progress in Treatment:   3/8/22 Focused on goal reduce anxiety symptoms: Identified progress in last week's chosen short-term goals. Affirmed behavioral steps. Activity to list short and mid-term goals and increase sense of ability for steps. Prompted J to identify specific ways helping others has improved mood. Highlighted successes in family relationships. Focused on goal emotional regulation: Planned for self-care time. Normalized positive and negative emotions. Focused on goal process trauma: Did not focus on this goal today. Focused on goal establish secure attachment: EFT interventions awareness of own and partner's needs. Facilitated clarity about progress and successes in relationships. Looked at balance of autonomy and interdependence. Increased awareness of place in family, contributions. Identified needs met. Initially Reported Symptoms:  Panic attacks, anxiety when driving or riding in car, feelings of anxiousness,  Feeling disconnected from reality, strong need for routine/predictability. Specific perfectionistic in how to complete tasks. Compulsions. Trouble falling asleep, staying asleep, early waking. Racing thoughts during night waking. Avg 5 hrs of sleep/night. Other times goes to bed early and sleeps a lot.   Reduced appetite during day, then snacks on \"junkfood\" all evening. 30 lb unintentional weight loss within a year. Hyper-focused, talks faster and louder over people. \"I get worked up. \" (Lasts 48 hrs)  Depressed feelings in day-to-day. Less severe than highs; still able to function during depression. Worries about:  Kids, housing, finances, being accepted by fiance and family. \"Waiting for next bomb to drop. \"     \"Always in survival mode. \"      Previous behavioral health treatment history: Therapy in teen years while in foster care. Diagnosed with Bipolar Disorder. Family psychiatric history: Father Paranoid Schizophrenia; all 3 of Beth's children diagnosed with ADHD. Half-sister Down's Syndrome. Siblings and mother psychiatric history unknown. Administered PHQ-2 on 11/10/21 with score of 2. PHQ Scores 11/10/2021 6/14/2019   PHQ2 Score 2 2   PHQ9 Score 2 2   Interpretation of Total Score Depression Severity: 1-4 = Minimal depression, 5-9 = Mild depression, 10-14 = Moderate depression, 15-19 = Moderately severe depression, 20-27 = Severe depression  BETSEY-7 on 11/10/21 score 15, on 12/9/21 score 12. Mental Status:  Appearance: within normal Limits   Affect:  consistent with expectations based upon mood   Attitude: Cooperative   Mood:   Calm, determined   Thought Process:  goal directed   Delusions: no evidence of delusions   Perceptions: No perceptual disturbance   Behavior:   Engaged in session   Psychomotor: Within normal limits   Speech: Within normal limits   Eye Contact: good   Orientation:  oriented to person, place, time, and general circumstances   Judgment & Insight:  normal insight and judgment     Risk Assessment:  Current Suicide Risk:  no suicidal ideation, nonsuicidal morbid ideation  Current Homicide Risk:  no homocidal ideation  Ari Cleary reported no previous history of suicidal ideation or behavior. Diagnosis:  Anxiety, moderate to severe  ADHD, Combined Type  Added 11/18/21  Post-traumatic stress symptoms    Initial Assessment, Impressions and Plan:  Augustin Parmar is a 42 y.o. old female who presents with moderate to severe anxiety symptoms that are interfering with her work, family and social functioning. Post-traumatic symptoms and attachment wounds also affect current functioning. Augustin Parmar is sub-threshold for Bipolar II Disorder; more information to be gained. Will benefit from DBT to reduce anxiety symptoms, stabilize emotional regulation, and EFT to process trauma and establish secure attachment with self and others. 3/2/22 Augustin Parmar has demonstrated self-regulation for several months through stressful events and challenges. Able to drive now without significant distress. Contact Shanita Alcala  at this office as needed.

## 2022-03-18 ENCOUNTER — OFFICE VISIT (OUTPATIENT)
Dept: BEHAVIORAL/MENTAL HEALTH CLINIC | Age: 42
End: 2022-03-18
Payer: COMMERCIAL

## 2022-03-18 DIAGNOSIS — F41.9 ANXIETY AND DEPRESSION: Primary | ICD-10-CM

## 2022-03-18 DIAGNOSIS — F32.A ANXIETY AND DEPRESSION: Primary | ICD-10-CM

## 2022-03-18 PROCEDURE — 4004F PT TOBACCO SCREEN RCVD TLK: CPT

## 2022-03-18 PROCEDURE — 90837 PSYTX W PT 60 MINUTES: CPT

## 2022-03-18 NOTE — PROGRESS NOTES
Behavioral Health Note  [facility]    Date of Service:  3/18/2022  1:00-2:00pm    Summary for PCP:  Uvaldo Dunlap feeling capable to meet challenges. Accessing external supports and placing own internal supports. Presenting Concerns:  Miri Bland is a 42 y.o. who was referred by her primary care physician, Sharla Narayanan to concerns about depression and anxiety. Today Uvaldo Dunlap requested her partner be present in session. Feeling good about recent parenting. Housing and summer visit with children now in place. Able to navigate emotions and thoughts to make current decisions. Calm and reflective in session. Deciding what level of relationship to have with adult daughter who asked to meet (hostile behavior history, negative influence on younger children). Progress in Treatment:   3/18/22 Focused on goal reduce anxiety symptoms: Reviewed recent parenting strategies, celebrated progress, made meaning of ability to stop think and plan. Focused on goal emotional regulation: Provided Safe Haven exercise for practice this week. Focused on goal process trauma: Provided empathy and reflective listening for Beth's emotions and thoughts with recent trigger to childhood volatile household. Highlighted current ability to set boundaries. Identified spots that are healed, and spots still raw. Make context of positive expectation of continued recovery. Focused on goal establish secure attachment: Activity to identify behaviors when in conflict cycle. Increased awareness of deeper emotions. Initially Reported Symptoms:  Panic attacks, anxiety when driving or riding in car, feelings of anxiousness,  Feeling disconnected from reality, strong need for routine/predictability. Specific perfectionistic in how to complete tasks. Compulsions. Trouble falling asleep, staying asleep, early waking. Racing thoughts during night waking. Avg 5 hrs of sleep/night. Other times goes to bed early and sleeps a lot. Reduced appetite during day, then snacks on \"junkfood\" all evening. 30 lb unintentional weight loss within a year. Hyper-focused, talks faster and louder over people. \"I get worked up. \" (Lasts 48 hrs)  Depressed feelings in day-to-day. Less severe than highs; still able to function during depression. Worries about:  Kids, housing, finances, being accepted by fiance and family. \"Waiting for next bomb to drop. \"     \"Always in survival mode. \"      Previous behavioral health treatment history: Therapy in teen years while in foster care. Diagnosed with Bipolar Disorder. Family psychiatric history: Father Paranoid Schizophrenia; all 3 of Beth's children diagnosed with ADHD. Half-sister Down's Syndrome. Siblings and mother psychiatric history unknown. Administered PHQ-2 on 11/10/21 with score of 2. PHQ Scores 11/10/2021 6/14/2019   PHQ2 Score 2 2   PHQ9 Score 2 2   Interpretation of Total Score Depression Severity: 1-4 = Minimal depression, 5-9 = Mild depression, 10-14 = Moderate depression, 15-19 = Moderately severe depression, 20-27 = Severe depression  BETSEY-7 on 11/10/21 score 15, on 12/9/21 score 12. Mental Status:  Appearance: within normal Limits   Affect:  consistent with expectations based upon mood   Attitude: Cooperative   Mood:   Calm, focused   Thought Process: Within normal limits   Delusions: no evidence of delusions   Perceptions: No perceptual disturbance   Behavior:   Engaged in session   Psychomotor: Within normal limits   Speech: Within normal limits   Eye Contact: good   Orientation:  oriented to person, place, time, and general circumstances   Judgment & Insight:  normal insight and judgment     Risk Assessment:  Current Suicide Risk:  no suicidal ideation, nonsuicidal morbid ideation  Current Homicide Risk:  no homocidal ideation  Verlouie Dunlap reported no previous history of suicidal ideation or behavior. Diagnosis:  Anxiety, moderate to severe  ADHD, Combined Type  Added 11/18/21  Post-traumatic stress symptoms    Initial Assessment, Impressions and Plan:  Kelsi Vargas is a 42 y.o. old female who presents with moderate to severe anxiety symptoms that are interfering with her work, family and social functioning. Post-traumatic symptoms and attachment wounds also affect current functioning. Kelsi Vargas is sub-threshold for Bipolar II Disorder; more information to be gained. Will benefit from DBT to reduce anxiety symptoms, stabilize emotional regulation, and EFT to process trauma and establish secure attachment with self and others. 3/2/22 Kelsi Vargas has demonstrated self-regulation for several months through stressful events and challenges. Able to drive now without significant distress. Contact Shanita Long Duty  at this office as needed.

## 2022-03-23 ENCOUNTER — OFFICE VISIT (OUTPATIENT)
Dept: FAMILY MEDICINE CLINIC | Age: 42
End: 2022-03-23
Payer: COMMERCIAL

## 2022-03-23 VITALS
SYSTOLIC BLOOD PRESSURE: 138 MMHG | BODY MASS INDEX: 24.17 KG/M2 | DIASTOLIC BLOOD PRESSURE: 70 MMHG | WEIGHT: 154 LBS | HEART RATE: 98 BPM | HEIGHT: 67 IN | RESPIRATION RATE: 18 BRPM | OXYGEN SATURATION: 100 %

## 2022-03-23 DIAGNOSIS — R35.0 URINARY FREQUENCY: ICD-10-CM

## 2022-03-23 DIAGNOSIS — G56.22 ULNAR NEUROPATHY AT ELBOW OF LEFT UPPER EXTREMITY: ICD-10-CM

## 2022-03-23 DIAGNOSIS — M54.12 CERVICAL RADICULOPATHY: Primary | ICD-10-CM

## 2022-03-23 PROCEDURE — 81002 URINALYSIS NONAUTO W/O SCOPE: CPT | Performed by: FAMILY MEDICINE

## 2022-03-23 PROCEDURE — G8420 CALC BMI NORM PARAMETERS: HCPCS | Performed by: FAMILY MEDICINE

## 2022-03-23 PROCEDURE — G8427 DOCREV CUR MEDS BY ELIG CLIN: HCPCS | Performed by: FAMILY MEDICINE

## 2022-03-23 PROCEDURE — G8484 FLU IMMUNIZE NO ADMIN: HCPCS | Performed by: FAMILY MEDICINE

## 2022-03-23 PROCEDURE — 4004F PT TOBACCO SCREEN RCVD TLK: CPT | Performed by: FAMILY MEDICINE

## 2022-03-23 PROCEDURE — 99214 OFFICE O/P EST MOD 30 MIN: CPT | Performed by: FAMILY MEDICINE

## 2022-03-23 RX ORDER — PREDNISONE 10 MG/1
TABLET ORAL
Qty: 21 TABLET | Refills: 0 | Status: SHIPPED | OUTPATIENT
Start: 2022-03-23 | End: 2022-08-11 | Stop reason: ALTCHOICE

## 2022-03-23 NOTE — PROGRESS NOTES
(Patient not taking: Reported on 12/14/2021) 10 tablet 0    docusate sodium (COLACE) 100 MG capsule Take 100 mg by mouth 2 times daily as needed  (Patient not taking: Reported on 12/14/2021)      senna (SENOKOT) 8.6 MG tablet Take 1 tablet by mouth daily (Patient not taking: Reported on 12/14/2021)       No current facility-administered medications for this visit. /70 (Site: Right Upper Arm, Position: Sitting, Cuff Size: Medium Adult)   Pulse 98   Resp 18   Ht 5' 7\" (1.702 m)   Wt 154 lb (69.9 kg)   LMP 03/09/2022   SpO2 100%   BMI 24.12 kg/m²     Physical Exam  Cervical neck exam  No significant midline tenderness to palpation  Mild bilateral cervical paraspinal tenderness  Equivocal Spurling's test  Positive lift off test    Extremities  Positive Tinel's sign at left elbow ulnar nerve site  Negative Tinel's sign bilateral median nerve at wrist    Wt Readings from Last 3 Encounters:   03/23/22 154 lb (69.9 kg)   10/20/21 138 lb (62.6 kg)   07/12/21 130 lb (59 kg)     BP Readings from Last 3 Encounters:   03/23/22 138/70   10/20/21 115/78   07/12/21 100/60     Assessment/Plan:  1. Cervical radiculopathy  Exam is consistent with a mixed picture of cervical radiculopathy as she has known cervical spine stenosis based on MRI, combined with symptoms of ulnar neuropathy most prominent on the left side with entrapment symptoms at her elbow. - predniSONE (DELTASONE) 10 MG tablet; Take 4 tabs by mouth daily for 2 days, 3 tabs for 2 days, 2 tabs for 2 days, 1 tab for 2 days, 1/2 tab for 2 days  Dispense: 21 tablet; Refill: 0    2. Ulnar neuropathy at elbow of left upper extremity  As above, we discussed use of steroids and possible brace and avoiding pressure trauma to the left elbow.   If her symptoms or not improving afterward I encouraged her to follow-up with PM&R for consideration of EMG versus repeat cervical spine injection due to the mixed nature of the symptoms  - predniSONE (DELTASONE) 10 MG tablet; Take 4 tabs by mouth daily for 2 days, 3 tabs for 2 days, 2 tabs for 2 days, 1 tab for 2 days, 1/2 tab for 2 days  Dispense: 21 tablet; Refill: 0    3.  Urinary frequency  Urinalysis without concerning findings  - POCT Urinalysis no Micro

## 2022-03-25 LAB
BILIRUBIN, POC: NEGATIVE
BLOOD URINE, POC: NEGATIVE
CLARITY, POC: NORMAL
COLOR, POC: NORMAL
GLUCOSE URINE, POC: NEGATIVE
KETONES, POC: NEGATIVE
LEUKOCYTE EST, POC: NEGATIVE
NITRITE, POC: NEGATIVE
PH, POC: 6.5
PROTEIN, POC: NEGATIVE
SPECIFIC GRAVITY, POC: 1.02
UROBILINOGEN, POC: NORMAL

## 2022-04-06 ENCOUNTER — TELEPHONE (OUTPATIENT)
Dept: FAMILY MEDICINE CLINIC | Age: 42
End: 2022-04-06

## 2022-04-06 DIAGNOSIS — M54.2 CHRONIC NECK PAIN: Primary | ICD-10-CM

## 2022-04-06 DIAGNOSIS — G89.29 CHRONIC NECK PAIN: Primary | ICD-10-CM

## 2022-04-06 RX ORDER — GABAPENTIN 400 MG/1
400 CAPSULE ORAL 3 TIMES DAILY
Qty: 90 CAPSULE | Refills: 2 | Status: SHIPPED | OUTPATIENT
Start: 2022-04-06 | End: 2022-07-27 | Stop reason: SDUPTHER

## 2022-04-06 NOTE — TELEPHONE ENCOUNTER
Can we verify her current dose and frequency of gabapentin? The one in the system currently is  so not sure if it is accurate  I can send in higher dose without appt, I recently discussed with her at a visit.   Thank you

## 2022-06-14 DIAGNOSIS — F32.A ANXIETY AND DEPRESSION: ICD-10-CM

## 2022-06-14 DIAGNOSIS — F41.9 ANXIETY AND DEPRESSION: ICD-10-CM

## 2022-06-14 RX ORDER — ESCITALOPRAM OXALATE 10 MG/1
10 TABLET ORAL DAILY
Qty: 90 TABLET | Refills: 1 | Status: SHIPPED | OUTPATIENT
Start: 2022-06-14 | End: 2022-08-11

## 2022-06-17 DIAGNOSIS — M48.02 FORAMINAL STENOSIS OF CERVICAL REGION: ICD-10-CM

## 2022-06-17 DIAGNOSIS — M54.2 CHRONIC NECK PAIN: ICD-10-CM

## 2022-06-17 DIAGNOSIS — G89.29 CHRONIC NECK PAIN: ICD-10-CM

## 2022-06-17 RX ORDER — NAPROXEN 500 MG/1
TABLET ORAL
Qty: 60 TABLET | Refills: 0 | Status: SHIPPED | OUTPATIENT
Start: 2022-06-17 | End: 2022-07-18

## 2022-07-17 DIAGNOSIS — M54.2 CHRONIC NECK PAIN: ICD-10-CM

## 2022-07-17 DIAGNOSIS — G89.29 CHRONIC NECK PAIN: ICD-10-CM

## 2022-07-17 DIAGNOSIS — M48.02 FORAMINAL STENOSIS OF CERVICAL REGION: ICD-10-CM

## 2022-07-18 RX ORDER — NAPROXEN 500 MG/1
TABLET ORAL
Qty: 60 TABLET | Refills: 0 | Status: SHIPPED | OUTPATIENT
Start: 2022-07-18 | End: 2022-08-26

## 2022-07-26 DIAGNOSIS — M54.2 CHRONIC NECK PAIN: ICD-10-CM

## 2022-07-26 DIAGNOSIS — G89.29 CHRONIC NECK PAIN: ICD-10-CM

## 2022-07-27 RX ORDER — GABAPENTIN 400 MG/1
400 CAPSULE ORAL 3 TIMES DAILY
Qty: 90 CAPSULE | Refills: 2 | Status: SHIPPED | OUTPATIENT
Start: 2022-07-27 | End: 2022-10-25

## 2022-08-11 ENCOUNTER — OFFICE VISIT (OUTPATIENT)
Dept: FAMILY MEDICINE CLINIC | Age: 42
End: 2022-08-11
Payer: COMMERCIAL

## 2022-08-11 VITALS
RESPIRATION RATE: 16 BRPM | OXYGEN SATURATION: 97 % | HEIGHT: 67 IN | BODY MASS INDEX: 22.26 KG/M2 | HEART RATE: 73 BPM | DIASTOLIC BLOOD PRESSURE: 62 MMHG | SYSTOLIC BLOOD PRESSURE: 110 MMHG | TEMPERATURE: 98.4 F | WEIGHT: 141.8 LBS

## 2022-08-11 DIAGNOSIS — R23.8 SKIN IRRITATION: ICD-10-CM

## 2022-08-11 DIAGNOSIS — Z13.31 POSITIVE DEPRESSION SCREENING: ICD-10-CM

## 2022-08-11 DIAGNOSIS — F32.A ANXIETY AND DEPRESSION: ICD-10-CM

## 2022-08-11 DIAGNOSIS — N30.01 ACUTE CYSTITIS WITH HEMATURIA: ICD-10-CM

## 2022-08-11 DIAGNOSIS — F41.9 ANXIETY AND DEPRESSION: ICD-10-CM

## 2022-08-11 DIAGNOSIS — R35.0 URINARY FREQUENCY: Primary | ICD-10-CM

## 2022-08-11 LAB
BILIRUBIN, POC: NORMAL
BLOOD URINE, POC: NORMAL
CLARITY, POC: NORMAL
COLOR, POC: YELLOW
GLUCOSE URINE, POC: NORMAL
KETONES, POC: NORMAL
LEUKOCYTE EST, POC: NORMAL
NITRITE, POC: POSITIVE
PH, POC: 5.5
PROTEIN, POC: NORMAL
SPECIFIC GRAVITY, POC: 1.03
UROBILINOGEN, POC: 0.2

## 2022-08-11 PROCEDURE — 81002 URINALYSIS NONAUTO W/O SCOPE: CPT | Performed by: NURSE PRACTITIONER

## 2022-08-11 PROCEDURE — 4004F PT TOBACCO SCREEN RCVD TLK: CPT | Performed by: NURSE PRACTITIONER

## 2022-08-11 PROCEDURE — G8420 CALC BMI NORM PARAMETERS: HCPCS | Performed by: NURSE PRACTITIONER

## 2022-08-11 PROCEDURE — G8427 DOCREV CUR MEDS BY ELIG CLIN: HCPCS | Performed by: NURSE PRACTITIONER

## 2022-08-11 PROCEDURE — 99214 OFFICE O/P EST MOD 30 MIN: CPT | Performed by: NURSE PRACTITIONER

## 2022-08-11 RX ORDER — ESCITALOPRAM OXALATE 10 MG/1
15 TABLET ORAL DAILY
Qty: 45 TABLET | Refills: 1 | Status: SHIPPED | OUTPATIENT
Start: 2022-08-11

## 2022-08-11 RX ORDER — NITROFURANTOIN 25; 75 MG/1; MG/1
100 CAPSULE ORAL 2 TIMES DAILY
Qty: 20 CAPSULE | Refills: 0 | Status: SHIPPED | OUTPATIENT
Start: 2022-08-11 | End: 2022-08-21

## 2022-08-11 ASSESSMENT — PATIENT HEALTH QUESTIONNAIRE - PHQ9
5. POOR APPETITE OR OVEREATING: 3
6. FEELING BAD ABOUT YOURSELF - OR THAT YOU ARE A FAILURE OR HAVE LET YOURSELF OR YOUR FAMILY DOWN: 1
SUM OF ALL RESPONSES TO PHQ QUESTIONS 1-9: 12
9. THOUGHTS THAT YOU WOULD BE BETTER OFF DEAD, OR OF HURTING YOURSELF: 0
8. MOVING OR SPEAKING SO SLOWLY THAT OTHER PEOPLE COULD HAVE NOTICED. OR THE OPPOSITE, BEING SO FIGETY OR RESTLESS THAT YOU HAVE BEEN MOVING AROUND A LOT MORE THAN USUAL: 0
SUM OF ALL RESPONSES TO PHQ QUESTIONS 1-9: 12
10. IF YOU CHECKED OFF ANY PROBLEMS, HOW DIFFICULT HAVE THESE PROBLEMS MADE IT FOR YOU TO DO YOUR WORK, TAKE CARE OF THINGS AT HOME, OR GET ALONG WITH OTHER PEOPLE: 3
SUM OF ALL RESPONSES TO PHQ QUESTIONS 1-9: 12
4. FEELING TIRED OR HAVING LITTLE ENERGY: 1
2. FEELING DOWN, DEPRESSED OR HOPELESS: 2
SUM OF ALL RESPONSES TO PHQ QUESTIONS 1-9: 12
3. TROUBLE FALLING OR STAYING ASLEEP: 2
7. TROUBLE CONCENTRATING ON THINGS, SUCH AS READING THE NEWSPAPER OR WATCHING TELEVISION: 3

## 2022-08-11 NOTE — PROGRESS NOTES
8/11/2022    This is a 42 y.o. female   Chief Complaint   Patient presents with    Other     Started 4-5 days ago. Redness. Drainage at times tinged with blood. Itches. Urinary Frequency     Started 4-5 days. Abd pressure. Urine is cloudy and strong odor. [place] HPI  Patient reports that she has been having urinary frequency for 4-5 days. Denies dysuria, hematuria, fever, vaginal discharge. Slight pelvic pain. Reports that there is a sore inside her belly button that has been itching for the past 4-5 days. Depression/anxiety   Has been to counseling in the past. Needs to schedule another session. Has increased anxiety. Patient Active Problem List   Diagnosis    History of tubal ligation    Tobacco use    History of spinal fusion - L5-S1 6/2012    Anxiety and depression    Bilateral carpal tunnel syndrome    Chronic pelvic pain in female    Chronic neck pain    Chronic migraine    Chronic pain syndrome    History of trauma          Current Outpatient Medications   Medication Sig Dispense Refill    gabapentin (NEURONTIN) 400 MG capsule Take 1 capsule by mouth in the morning and 1 capsule at noon and 1 capsule before bedtime. Do all this for 90 days. 90 capsule 2    naproxen (NAPROSYN) 500 MG tablet TAKE 1 TABLET BY MOUTH TWICE A DAY WITH MEALS 60 tablet 0    escitalopram (LEXAPRO) 10 MG tablet TAKE 1 TABLET BY MOUTH DAILY 90 tablet 1    propranolol (INDERAL) 20 MG tablet Take 1 tablet by mouth 3 times daily as needed (anxiety) 90 tablet 0    busPIRone (BUSPAR) 5 MG tablet Take 1 tablet by mouth 2 times daily as needed (anxiety) 60 tablet 1    Potassium 99 MG TABS Take by mouth daily      Magnesium 500 MG CAPS Take by mouth daily      Ascorbic Acid (VITAMIN C) 500 MG CAPS Take by mouth daily      Nutritional Supplements (HIGH-PROTEIN NUTRITIONAL SHAKE PO) Take by mouth [employer] shake daily.       omeprazole (PRILOSEC) 20 MG delayed release capsule Take 1 capsule by mouth daily 30 capsule 0    albuterol sulfate HFA (VENTOLIN HFA) 108 (90 Base) MCG/ACT inhaler Inhale 2 puffs into the lungs 4 times daily as needed for Wheezing 1 Inhaler 5    tiZANidine (ZANAFLEX) 2 MG tablet Take 1 tablet by mouth nightly as needed (neck pain) 10 tablet 0    docusate sodium (COLACE) 100 MG capsule Take 100 mg by mouth 2 times daily as needed      senna (SENOKOT) 8.6 MG tablet Take 1 tablet by mouth in the morning. melatonin 3 MG TABS tablet Take 3 mg by mouth daily      acetaminophen (APAP EXTRA STRENGTH) 500 MG tablet Take 2 tablets by mouth every 6 hours as needed for Pain DO NOT TAKE WITH OTHER MEDICATIONS CONTAINING ACETAMINOPHEN. 30 tablet 0    nicotine (NICODERM CQ) 14 MG/24HR Place 1 patch onto the skin every 24 hours (Patient not taking: Reported on 3/23/2022) 30 patch 2    verapamil (CALAN SR) 180 MG extended release tablet Take 1 tablet by mouth daily (Patient not taking: Reported on 12/14/2021) 90 tablet 1     No current facility-administered medications for this visit. Allergies   Allergen Reactions    Bee Venom     Fentanyl      Patches - adhesive area itching    Penicillins        Review of Systems   Constitutional:  Positive for fever. Negative for activity change. Respiratory:  Negative for cough and shortness of breath. Cardiovascular:  Negative for chest pain. Genitourinary:  Positive for frequency. Negative for difficulty urinating, dysuria, hematuria and vaginal discharge. Psychiatric/Behavioral:  Positive for dysphoric mood. Negative for suicidal ideas. The patient is nervous/anxious. Vitals:    08/11/22 1535   BP: 110/62   Site: Right Upper Arm   Position: Sitting   Cuff Size: Medium Adult   Pulse: 73   Resp: 16   Temp: 98.4 °F (36.9 °C)   TempSrc: Oral   SpO2: 97%   Weight: 141 lb 12.8 oz (64.3 kg)   Height: 5' 7\" (1.702 m)       Body mass index is 22.21 kg/m².      Wt Readings from Last 3 Encounters:   08/11/22 141 lb 12.8 oz (64.3 kg)   03/23/22 154 lb (69.9 kg)   10/20/21 138 lb (62.6 includes but not limited to: Medication management and Referral to /Specialist  for evaluation and management. Anxiety and depression: uncontrolled. Will increase escitalopram to 15 mg daily. Advised to f/u with her counselor and she reports that she will call and schedule. -     escitalopram (LEXAPRO) 10 MG tablet; Take 1.5 tablets by mouth in the morning. Patient is to call if mood worsens before next visit     Return in about 4 weeks (around 9/8/2022), or if symptoms worsen or fail to improve, for chronic conditions, with Dr. Daniela Enamorado.

## 2022-08-12 ENCOUNTER — TELEMEDICINE (OUTPATIENT)
Dept: BEHAVIORAL/MENTAL HEALTH CLINIC | Age: 42
End: 2022-08-12
Payer: COMMERCIAL

## 2022-08-12 DIAGNOSIS — F32.A ANXIETY AND DEPRESSION: Primary | ICD-10-CM

## 2022-08-12 DIAGNOSIS — F41.9 ANXIETY AND DEPRESSION: Primary | ICD-10-CM

## 2022-08-12 PROCEDURE — 90834 PSYTX W PT 45 MINUTES: CPT

## 2022-08-12 PROCEDURE — 4004F PT TOBACCO SCREEN RCVD TLK: CPT

## 2022-08-13 LAB
ORGANISM: ABNORMAL
URINE CULTURE, ROUTINE: ABNORMAL

## 2022-08-15 ENCOUNTER — TELEPHONE (OUTPATIENT)
Dept: FAMILY MEDICINE CLINIC | Age: 42
End: 2022-08-15

## 2022-08-15 ASSESSMENT — ENCOUNTER SYMPTOMS
SHORTNESS OF BREATH: 0
COUGH: 0

## 2022-08-16 NOTE — PROGRESS NOTES
Behavioral Health Note  [facility]    Date of Service:  8/12/2022  10:00-10:45am    Summary for PCP:  [facility] re-implementing structure, routine, consistency. 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 Identifying feelings of sadness. Deepening awareness of self and needs. Presenting Concerns:  Aicha Quiroz is a 42 y.o. who was referred by her primary care physician, Ovi Peter MD due to concerns about depression and anxiety. [facility]. \"More depressed and angry than anxious\" at this time. PCP prescribed mood medication yesterday with follow-up in 4 weeks. \"Had been doing quite well\" 5 months ago when [facility] from regular therapy to care for children's needs out of state. Now, difficulty falling/staying asleep. Fatigue, irritability, no appetite, only eating once/day plus protein shakes. Daughter anger outbursts, \"complete melt-downs. \"  \"I'm done coming in and rescuing. \"  \"I need to focus on things I have control over. 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 \"Will I be able to be stable for them? \"  Identified routine, structure, consistency we had in place previously had helped. Wanting daily well-being and peace, no drama. Progress in Treatment:   8/12/22 Focused on goal reduce anxiety symptoms: Practiced muscle relaxations. Reviewed what went well in past months. Provided empathy for multiple stressors discussed. Framed symptoms clarity as self-awareness. Sketched desired goals. Focused on goal emotional regulation: Prompted [facility] to identify what helped in the past.   Collaborated to plan re-installation of routine, structure, consistency. Analogy ocean waves to normalize stressors, challenges, and promote acceptance, flexibility. Differentiated structure vs rigidity.       Focused on goal process trauma: Reinforced adaptive self-talk. Focused on goal establish secure attachment: Discussed current status but did not address this goal today. Initially Reported Symptoms:  Panic attacks, anxiety when driving or riding in car, feelings of anxiousness,  Feeling disconnected from reality, strong need for routine/predictability. Specific perfectionistic in how to complete tasks. Compulsions. Trouble falling asleep, staying asleep, early waking. Racing thoughts during night waking. Avg 5 hrs of sleep/night. Other times goes to bed early and sleeps a lot. Reduced appetite during day, then snacks on \"junkfood\" all evening. 30 lb unintentional weight loss within a year. Hyper-focused, talks faster and louder over people. \"I get worked up. \" (Lasts 48 hrs)  Depressed feelings in day-to-day. Less severe than highs; still able to function during depression. Worries about:  Kids, housing, finances, being accepted by fiance and family. \"Waiting for next bomb to drop. \"     \"Always in survival mode. \"      Previous behavioral health treatment history: Therapy in teen years while in foster care. Diagnosed with Bipolar Disorder. Family psychiatric history: Father Paranoid Schizophrenia; all 3 of Beth's children diagnosed with ADHD. Half-sister Down's Syndrome. Siblings and mother psychiatric history unknown. Administered PHQ-2 on 11/10/21 with score of 2. PHQ Scores 8/11/2022 11/10/2021 6/14/2019   PHQ2 Score - 2 2   PHQ9 Score 12 2 2   Interpretation of Total Score Depression Severity: 1-4 = Minimal depression, 5-9 = Mild depression, 10-14 = Moderate depression, 15-19 = Moderately severe depression, 20-27 = Severe depression  BETSEY-7 on 11/10/21 score 15, on 12/9/21 score 12. Mental Status:  Appearance: within normal Limits   Affect:  consistent with expectations based upon mood   Attitude: Cooperative   Mood:   Sad   Thought Process:   Within normal limits   Delusions: no evidence of delusions   Perceptions: No perceptual disturbance   Behavior:   Engaged in session   Psychomotor: Within normal limits   Speech: Within normal limits   Eye Contact: good   Orientation:  oriented to person, place, time, and general circumstances   Judgment & Insight:  normal insight and judgment     Risk Assessment:  Current Suicide Risk:  no suicidal ideation, nonsuicidal morbid ideation  Current Homicide Risk:  no homocidal ideation  Sheree Baumann reported no previous history of suicidal ideation or behavior. Diagnosis: Anxiety, moderate to severe  ADHD, Combined Type  Added 11/18/21  Post-traumatic stress symptoms    Initial Assessment, Impressions and Plan:  Sheree Baumann is a 42 y.o. old female who presents with moderate to severe anxiety symptoms that are interfering with her work, family and social functioning. Post-traumatic symptoms and attachment wounds also affect current functioning. Sheree Baumann is sub-threshold for Bipolar II Disorder; more information to be gained. Will benefit from DBT to reduce anxiety symptoms, stabilize emotional regulation, and EFT to process trauma and establish secure attachment with self and others. 3/2/22 Sheree Baumann has demonstrated self-regulation for several months through stressful events and challenges. Able to drive now without significant distress. Contact Shanita Choi  at this office as needed. Sheree Baumann was evaluated through a synchronous (real-time) audio encounter. The patient (or guardian if applicable) is aware that this is a billable service. Verbal consent to proceed has been obtained within the past 12 months. The visit was conducted pursuant to the emergency declaration under the [facility], [address] [place] [address] authority and the [employer] and [employer] General Act. Patient identification was verified, and a caregiver was present when appropriate.  The patient was located in [place], a [facility] where the provider was credentialed to provide care. Azam Maher was unable to make Voices virtual visit connect, although she hasn't had trouble with it before. Completed visit by phone as needs were urgent to provide timely support, as she hasn't had access to therapy for 5 months (out of state to care for kids' needs).

## 2022-08-22 ENCOUNTER — OFFICE VISIT (OUTPATIENT)
Dept: BEHAVIORAL/MENTAL HEALTH CLINIC | Age: 42
End: 2022-08-22
Payer: COMMERCIAL

## 2022-08-22 DIAGNOSIS — F41.9 ANXIETY AND DEPRESSION: Primary | ICD-10-CM

## 2022-08-22 DIAGNOSIS — F32.A ANXIETY AND DEPRESSION: Primary | ICD-10-CM

## 2022-08-22 PROCEDURE — 4004F PT TOBACCO SCREEN RCVD TLK: CPT

## 2022-08-22 PROCEDURE — 90837 PSYTX W PT 60 MINUTES: CPT

## 2022-08-23 NOTE — PROGRESS NOTES
Behavioral Health Note  [facility]    Date of Service:  8/22/2022  10:00-11:00am    Summary for PCP:  Addressing negative thought loop increasing as [place] routines and structure were upended for the summer. Today José Abrams reported she bought a new (to her) vehicle and is driving without distress. Feeling positive about resume and applications. Interview today. Described feeling disrespected by her partner and her sister (he is living with). Feels obsessive compulsive symptoms are increasing. Progress in Treatment:   8/22/22 Focused on goal reduce anxiety symptoms: Reviewed together, progress in driving without anxiety overwhelm. Framed Beth's current symptoms as \"negative thought loop. \"  Provided information on resources for obsessive thoughts. Discussed symptoms of ADHD and OCD. Clarified ability to plan for symptoms and live in wellness regardless of label. Discussed rigidity/adaptability. Focused on goal emotional regulation: Discussed return to daily structure as children have gone home from summer visit and new job will begin. Prompted José Abrams to identify how things are different when daily routine is established. Focused on goal process trauma: Did not address this goal today. Focused on goal establish secure attachment: José Abrams described desire to focus on own daily wellness and growth, as partner has not been present in relationship for several months. Therapist prompted José Abrams to identify significant attachments she experiences as safe and secure. Initially Presented Concerns:  Roosevelt Arteaga is a 42 y.o. who was referred by her primary care physician, Ce Wills MD due to concerns about depression and anxiety. José Abrams reported symptoms:  Panic attacks, anxiety when driving or riding in car, feelings of anxiousness,  Feeling disconnected from reality, strong need for routine/predictability.    Specific perfectionistic in how to complete tasks. Compulsions. Trouble falling asleep, staying asleep, early waking. Racing thoughts during night waking. Avg 5 hrs of sleep/night. Other times goes to bed early and sleeps a lot. Reduced appetite during day, then snacks on \"junkfood\" all evening. 30 lb unintentional weight loss within a year. Hyper-focused, talks faster and louder over people. \"I get worked up. \" (Lasts 48 hrs)  Depressed feelings in day-to-day. Less severe than highs; still able to function during depression. Worries about:  Kids, housing, finances, being accepted by fiance and family. \"Waiting for next bomb to drop. \"     \"Always in survival mode. \"      Previous behavioral health treatment history: Therapy in teen years while in foster care. Diagnosed with Bipolar Disorder. Family psychiatric history: Father Paranoid Schizophrenia; all 3 of Beth's children diagnosed with ADHD. Half-sister Down's Syndrome. Siblings and mother psychiatric history unknown. Administered PHQ-2 on 11/10/21 with score of 2. PHQ Scores 8/11/2022 11/10/2021 6/14/2019   PHQ2 Score - 2 2   PHQ9 Score 12 2 2   Interpretation of Total Score Depression Severity: 1-4 = Minimal depression, 5-9 = Mild depression, 10-14 = Moderate depression, 15-19 = Moderately severe depression, 20-27 = Severe depression  BETSEY-7 on 11/10/21 score 15, on 12/9/21 score 12. Mental Status:  Appearance: within normal Limits   Affect:  consistent with expectations based upon mood   Attitude: Cooperative   Mood:   Sad   Thought Process: Within normal limits   Delusions: no evidence of delusions   Perceptions: No perceptual disturbance   Behavior:   Engaged in session   Psychomotor: Within normal limits   Speech:    Within normal limits   Eye Contact: good   Orientation:  oriented to person, place, time, and general circumstances   Judgment & Insight:  normal insight and judgment     Risk Assessment:  Current Suicide Risk:  no suicidal ideation, nonsuicidal morbid ideation  Current Homicide Risk:  no homocidal ideation  Stan Busby reported no previous history of suicidal ideation or behavior. Diagnosis: Anxiety, moderate to severe  ADHD, Combined Type  Added 11/18/21  Post-traumatic stress symptoms    Initial Assessment, Impressions and Plan:  Stan Busby is a 42 y.o. old female who presents with moderate to severe anxiety symptoms that are interfering with her work, family and social functioning. Post-traumatic symptoms and attachment wounds also affect current functioning. Stan Busby is sub-threshold for Bipolar II Disorder; more information to be gained. Will benefit from DBT to reduce anxiety symptoms, stabilize emotional regulation, and EFT to process trauma and establish secure attachment with self and others. 3/2/22 Stan Busby has demonstrated self-regulation for several months through stressful events and challenges. Able to drive now without significant distress. Contact Shanita Hughes  at this office as needed.

## 2022-08-25 ENCOUNTER — PATIENT MESSAGE (OUTPATIENT)
Dept: FAMILY MEDICINE CLINIC | Age: 42
End: 2022-08-25

## 2022-08-26 DIAGNOSIS — M48.02 FORAMINAL STENOSIS OF CERVICAL REGION: ICD-10-CM

## 2022-08-26 DIAGNOSIS — M54.2 CHRONIC NECK PAIN: ICD-10-CM

## 2022-08-26 DIAGNOSIS — G89.29 CHRONIC NECK PAIN: ICD-10-CM

## 2022-08-26 RX ORDER — NAPROXEN 500 MG/1
TABLET ORAL
Qty: 60 TABLET | Refills: 5 | Status: SHIPPED | OUTPATIENT
Start: 2022-08-26

## 2022-08-26 RX ORDER — SULFAMETHOXAZOLE AND TRIMETHOPRIM 800; 160 MG/1; MG/1
1 TABLET ORAL 2 TIMES DAILY
Qty: 14 TABLET | Refills: 0 | Status: SHIPPED | OUTPATIENT
Start: 2022-08-26 | End: 2022-09-02

## 2022-08-26 NOTE — TELEPHONE ENCOUNTER
From: Herminia Godinez  To: Ruby Like  Sent: 8/25/2022 1:29 PM EDT  Subject: Question regarding CULTURE, URINE    Hello. I have finished the antibiotic prescribed for the UTI. Unfortunately, my symptoms have not changed or improved. Because thats the case Im wondering next steps? Do we schedule for me to be seen againretest? Do I need another but maybe different round of antibiotic? Please let me know. I will be in the office Monday for an unrelated appointment. If being seen is recommended would Monday be possible?

## 2022-08-26 NOTE — TELEPHONE ENCOUNTER
UTI has gotten worse   She is having burning today  She wants to know if she can get another round of antibiotics or if she should be seen somewhere.      She is ok to leave another sample or be seen again if needed- she has an appt with Shanita on Monday and said she can be seen or leave a sample on Monday     Phone # [phone]    Please advise

## 2022-08-26 NOTE — TELEPHONE ENCOUNTER
----- [facility] 8[facility]26/2022  4:34 PM EDT -----  Subject: Message to Provider    QUESTIONS  Information for Provider? pt called and stated she is having uti symptoms   and burning when urine - out of first round of meds   ---------------------------------------------------------------------------  --------------  [id number]  [phone]; OK to leave message on voicemail, OK to respond with   electronic message via [employer] portal (only for patients who have   registered [employer] account)  ---------------------------------------------------------------------------  --------------  SCRIPT ANSWERS  Relationship to Patient? Self  Have you recently (14 days) seen a provider for this problem?  Yes

## 2022-08-29 ENCOUNTER — OFFICE VISIT (OUTPATIENT)
Dept: BEHAVIORAL/MENTAL HEALTH CLINIC | Age: 42
End: 2022-08-29
Payer: COMMERCIAL

## 2022-08-29 DIAGNOSIS — F41.9 ANXIETY: Primary | ICD-10-CM

## 2022-08-29 PROCEDURE — 4004F PT TOBACCO SCREEN RCVD TLK: CPT

## 2022-08-29 PROCEDURE — 90837 PSYTX W PT 60 MINUTES: CPT

## 2022-09-01 NOTE — PROGRESS NOTES
Behavioral Health Note  [facility]    Date of Service:  8/29/2022  1:00-2:00pm    Summary for PCP: Letha White reporting current medications feel effective for her. She may be moving out of state, so we discussed transferring to new PCP and Therapist.     Today Letha White described excitement at new career path that will take her out of state, but closer to her children. Feels resolved and not conflictual with leaving partner relationship. Remaining friends. Took a temporary part time job in a familiar setting for now. Progress in Treatment:   8/29/22 Focused on goal reduce anxiety symptoms: Normalized excitement and anxiety both during changes. Reviewed self-care skills to remain grounded. Focused on goal emotional regulation: Highlighted benefits of structured daily schedule. Planned structure through transition time. Focused on goal process trauma: Accessed sense of selfhood, ability, and continuity. Focused on goal establish secure attachment: Discussed connection/social needs in new community. Initially Presented Concerns:  Carlos Gross is a 42 y.o. who was referred by her primary care physician, Francisco Braun MD due to concerns about depression and anxiety. Letha White reported symptoms:  Panic attacks, anxiety when driving or riding in car, feelings of anxiousness,  Feeling disconnected from reality, strong need for routine/predictability. Specific perfectionistic in how to complete tasks. Compulsions. Trouble falling asleep, staying asleep, early waking. Racing thoughts during night waking. Avg 5 hrs of sleep/night. Other times goes to bed early and sleeps a lot. Reduced appetite during day, then snacks on \"junkfood\" all evening. 30 lb unintentional weight loss within a year. Hyper-focused, talks faster and louder over people. \"I get worked up. \" (Lasts 48 hrs)  Depressed feelings in day-to-day.  Less severe than highs; still able to function during depression. Worries about:  Kids, housing, finances, being accepted by fiance and family. \"Waiting for next bomb to drop. \"     \"Always in survival mode. \"      Previous behavioral health treatment history: Therapy in teen years while in foster care. Diagnosed with Bipolar Disorder. Family psychiatric history: Father Paranoid Schizophrenia; all 3 of Beth's children diagnosed with ADHD. Half-sister Down's Syndrome. Siblings and mother psychiatric history unknown. Administered PHQ-2 on 11/10/21 with score of 2. PHQ Scores 8/11/2022 11/10/2021 6/14/2019   PHQ2 Score - 2 2   PHQ9 Score 12 2 2   Interpretation of Total Score Depression Severity: 1-4 = Minimal depression, 5-9 = Mild depression, 10-14 = Moderate depression, 15-19 = Moderately severe depression, 20-27 = Severe depression  BETSEY-7 on 11/10/21 score 15, on 12/9/21 score 12. Mental Status:  Appearance: within normal Limits   Affect:  consistent with expectations based upon mood   Attitude: Cooperative   Mood:   Hopeful, anxious   Thought Process: Within normal limits   Delusions: no evidence of delusions   Perceptions: No perceptual disturbance   Behavior:   Engaged in session   Psychomotor: Within normal limits   Speech: Within normal limits   Eye Contact: good   Orientation:  oriented to person, place, time, and general circumstances   Judgment & Insight:  normal insight and judgment     Risk Assessment:  Current Suicide Risk:  no suicidal ideation, nonsuicidal morbid ideation  Current Homicide Risk:  no homocidal ideation  Jonny Paul reported no previous history of suicidal ideation or behavior. Diagnosis: Anxiety, moderate to severe  ADHD, Combined Type  Added 11/18/21  Post-traumatic stress symptoms    Initial Assessment, Impressions and Plan:  Jonny Paul is a 42 y.o. old female who presents with moderate to severe anxiety symptoms that are interfering with her work, family and social functioning.  Post-traumatic symptoms and attachment wounds also affect current functioning. Delmy Isidro is sub-threshold for Bipolar II Disorder; more information to be gained. Will benefit from DBT to reduce anxiety symptoms, stabilize emotional regulation, and EFT to process trauma and establish secure attachment with self and others. 3/2/22 Delmy Isidro has demonstrated self-regulation for several months through stressful events and challenges. Able to drive now without significant distress. Contact Shanita Welch  at this office as needed.

## 2022-09-08 ENCOUNTER — OFFICE VISIT (OUTPATIENT)
Dept: FAMILY MEDICINE CLINIC | Age: 42
End: 2022-09-08

## 2022-09-08 VITALS
WEIGHT: 141 LBS | DIASTOLIC BLOOD PRESSURE: 80 MMHG | RESPIRATION RATE: 18 BRPM | HEIGHT: 67 IN | HEART RATE: 72 BPM | BODY MASS INDEX: 22.13 KG/M2 | OXYGEN SATURATION: 98 % | SYSTOLIC BLOOD PRESSURE: 132 MMHG

## 2022-09-08 DIAGNOSIS — R35.0 URINARY FREQUENCY: Primary | ICD-10-CM

## 2022-09-08 DIAGNOSIS — F41.9 ANXIETY AND DEPRESSION: ICD-10-CM

## 2022-09-08 DIAGNOSIS — N89.8 VAGINAL DISCHARGE: ICD-10-CM

## 2022-09-08 DIAGNOSIS — F32.A ANXIETY AND DEPRESSION: ICD-10-CM

## 2022-09-08 DIAGNOSIS — R45.86 MOOD CHANGES: ICD-10-CM

## 2022-09-08 LAB
BILIRUBIN, POC: NEGATIVE
BLOOD URINE, POC: NEGATIVE
CLARITY, POC: NORMAL
COLOR, POC: YELLOW
GLUCOSE URINE, POC: NEGATIVE
KETONES, POC: NEGATIVE
LEUKOCYTE EST, POC: NORMAL
NITRITE, POC: NEGATIVE
PH, POC: 6.5
PROTEIN, POC: NEGATIVE
SPECIFIC GRAVITY, POC: 1.02
UROBILINOGEN, POC: NORMAL

## 2022-09-08 RX ORDER — ARIPIPRAZOLE 2 MG/1
TABLET ORAL
Qty: 60 TABLET | Refills: 1 | Status: SHIPPED | OUTPATIENT
Start: 2022-09-08 | End: 2022-09-21 | Stop reason: DRUGHIGH

## 2022-09-08 NOTE — PROGRESS NOTES
9/8/2022    This is a 42 y.o. female   Chief Complaint   Patient presents with    1 Month Follow-Up     Pt is here for a 1 month follow up on urinary frequency. Pt has been on 2 rounds of antibiotics and is still having symptoms. HPI    Here for f/u for urinary symptoms  - urine culture grew E coli, mon-sensitive. Carli Ortega followed by Bactrim and her symptoms have persisted. - notes frequency and intermittent dysuria as well.   - has noticed a clear vaginal discharge more recently with some mild occasional irritation as well    Mood  - had been struggling more with mood regulation and at appt 4 weeks ago, Lexapro was increased to 15mg. She notes some positive improvement in symptoms since that time and feels like things are leveling back out. Still struggling with OCD traits. Seeing our clinical counselor here as well. She also notes that sometimes her heightened mood is more euphoric. Has needed a little less sleep as well, but still getting about 5 hours. No spending sprees or driving places unusual    Review of Systems     Current Outpatient Medications   Medication Sig Dispense Refill    ARIPiprazole (ABILIFY) 2 MG tablet Take 1 tablet by mouth daily for 2 weeks, followed by 2 tablets by mouth daily 60 tablet 1    naproxen (NAPROSYN) 500 MG tablet TAKE 1 TABLET BY MOUTH TWICE A DAY WITH MEALS 60 tablet 5    escitalopram (LEXAPRO) 10 MG tablet Take 1.5 tablets by mouth in the morning. 45 tablet 1    gabapentin (NEURONTIN) 400 MG capsule Take 1 capsule by mouth in the morning and 1 capsule at noon and 1 capsule before bedtime. Do all this for 90 days.  90 capsule 2    propranolol (INDERAL) 20 MG tablet Take 1 tablet by mouth 3 times daily as needed (anxiety) 90 tablet 0    busPIRone (BUSPAR) 5 MG tablet Take 1 tablet by mouth 2 times daily as needed (anxiety) 60 tablet 1    Potassium 99 MG TABS Take by mouth daily      Magnesium 500 MG CAPS Take by mouth daily      Ascorbic Acid (VITAMIN C) 500 MG CAPS Take by mouth daily      Nutritional Supplements (HIGH-PROTEIN NUTRITIONAL SHAKE PO) Take by mouth [employer] shake daily. omeprazole (PRILOSEC) 20 MG delayed release capsule Take 1 capsule by mouth daily 30 capsule 0    albuterol sulfate HFA (VENTOLIN HFA) 108 (90 Base) MCG/ACT inhaler Inhale 2 puffs into the lungs 4 times daily as needed for Wheezing 1 Inhaler 5    tiZANidine (ZANAFLEX) 2 MG tablet Take 1 tablet by mouth nightly as needed (neck pain) 10 tablet 0    docusate sodium (COLACE) 100 MG capsule Take 100 mg by mouth 2 times daily as needed      senna (SENOKOT) 8.6 MG tablet Take 1 tablet by mouth in the morning. melatonin 3 MG TABS tablet Take 3 mg by mouth daily      acetaminophen (APAP EXTRA STRENGTH) 500 MG tablet Take 2 tablets by mouth every 6 hours as needed for Pain DO NOT TAKE WITH OTHER MEDICATIONS CONTAINING ACETAMINOPHEN. 30 tablet 0    nicotine (NICODERM CQ) 14 MG/24HR Place 1 patch onto the skin every 24 hours (Patient not taking: Reported on 3/23/2022) 30 patch 2    verapamil (CALAN SR) 180 MG extended release tablet Take 1 tablet by mouth daily (Patient not taking: Reported on 12/14/2021) 90 tablet 1     No current facility-administered medications for this visit. /80 (Site: Right Upper Arm, Position: Sitting, Cuff Size: Medium Adult)   Pulse 72   Resp 18   Ht 5' 7\" (1.702 m)   Wt 141 lb (64 kg)   SpO2 98%   BMI 22.08 kg/m²     Physical Exam  MA Thais present for exam  No vaginal abnormalities noted, mild clear discharge, sample obtained    Wt Readings from Last 3 Encounters:   09/08/22 141 lb (64 kg)   08/11/22 141 lb 12.8 oz (64.3 kg)   03/23/22 154 lb (69.9 kg)     BP Readings from Last 3 Encounters:   09/08/22 132/80   08/11/22 110/62   03/23/22 138/70     Assessment/Plan:  1. Urinary frequency  Recent antibiotic usage which may skew culture, check for organisms by PCR  - POCT Urinalysis no Micro  - Urinary Tract Infection Organism and Resistance ID by PCR    2. Vaginal discharge  Possible yeast versus bacterial overgrowth. Check PCR testing  - Sexual Health Organism ID by PCR    3. Anxiety and depression  - ARIPiprazole (ABILIFY) 2 MG tablet; Take 1 tablet by mouth daily for 2 weeks, followed by 2 tablets by mouth daily  Dispense: 60 tablet; Refill: 1    4. Mood changes  Has had more heightened mood on higher dose of SSRI. Showing signs of possible borderline hypomania without meeting full criteria. We discussed this in detail. Given this, we will add Abilify at a low dose as it would likely also help with OCD symptoms and anxiety. At follow-up, if still having more elevated mood symptoms we would consider dose reduction of the Lexapro. - ARIPiprazole (ABILIFY) 2 MG tablet; Take 1 tablet by mouth daily for 2 weeks, followed by 2 tablets by mouth daily  Dispense: 60 tablet; Refill: 1      Return in about 2 months (around 11/8/2022) for f/u for mood.

## 2022-09-08 NOTE — Clinical Note
Hi Shanita,  An an increased dose of SSRI, Loni Montano was starting to show more 'elevated mood' - somewhat close to hypomanic symptoms. I added Abilify to her regimen and will see her back soon. Wanted to keep you in the loop!   Thanks, Eryn Lujan

## 2022-09-09 DIAGNOSIS — Z22.39 CARRIER OF UREAPLASMA UREALYTICUM: Primary | ICD-10-CM

## 2022-09-09 LAB
ACINETOBACTER BAUMANNII BY PCR: NOT DETECTED
BACTEROIDES FRAGILIS BY PCR: NOT DETECTED
CANDIDA PARAPSILOSIS: NOT DETECTED
CANDIDA SPP: NOT DETECTED
CARBAPENEM RESISTANCE OXA-48 GENE BY PCR: NOT DETECTED
CARBAPENEM RESISTANCE OXA-48 GENE BY PCR: NOT DETECTED
CEPHALOSPORIN RESISTANCE AMPC GENE: NOT DETECTED
CEPHALOSPORIN RESISTANCE AMPC GENE: NOT DETECTED
CHLAMYDIA TRACHOMATIS BY RT-PCR: NOT DETECTED
CITROBACTER FREUNDII: NOT DETECTED
ENTEROBACTER CLOACAE: NOT DETECTED
ENTEROCOCCUS SPP. (E. FAECALIS, E. FAECIUM): NOT DETECTED
ESBL RESISTANCE: NOT DETECTED
ESBL RESISTANCE: NOT DETECTED
ESCHERICHIA COLI BY PCR: NOT DETECTED
GARDNERELLA VAGINALIS: ABNORMAL
KLEBSIELLA OXYTOCA BY PCR: NOT DETECTED
KLEBSIELLA PNEUMONIAE: NOT DETECTED
LACTOBACILLUS SPP.: ABNORMAL
M HOMINIS SPEC QL CULT: NOT DETECTED
MACROLIDE RESISTANCE: NOT DETECTED
MACROLIDE RESISTANCE: NOT DETECTED
METHICILLIN RESISTANCE: NOT DETECTED
METHICILLIN RESISTANCE: NOT DETECTED
MORGANELLA MORGANII: NOT DETECTED
MYCOPLASMA GENITALIUM PCR: NOT DETECTED
NEISSERIA GONORRHOEAE BY RT-PCR: NOT DETECTED
PROTEUS SPP (PROTEUS MIRABILIS, PROTEUS VULGARIS): NOT DETECTED
PROVIDENCIA STUARTII: NOT DETECTED
PSEUDOMONAS AERUGINOSA BY PCR: NOT DETECTED
QUINOLONE AND FLUOROQUINOLONE RESISTANCE: NOT DETECTED
QUINOLONE AND FLUOROQUINOLONE RESISTANCE: NOT DETECTED
SERRATIA MARCESCENS BY PCR: NOT DETECTED
STAPHYLOCOCCUS AUREUS BY PCR: NOT DETECTED
STAPHYLOCOCCUS SAPROPHYTICUS: NOT DETECTED
STREPTOCOCCUS AGALACTIAE BY PCR: NOT DETECTED
STREPTOCOCCUS PYOGENES  BY PCR: NOT DETECTED
TETRACYCLINE RESISTANCE: ABNORMAL
TETRACYCLINE RESISTANCE: NOT DETECTED
TRICHOMONAS VAGINALIS: NOT DETECTED
TRIMETHOPRIM/SULFONAMIDE RESISTANCE: ABNORMAL
TRIMETHOPRIM/SULFONAMIDE RESISTANCE: NOT DETECTED
UREAPLASMA UREALYTICUM BY PCR: DETECTED

## 2022-09-09 RX ORDER — METRONIDAZOLE 500 MG/1
500 TABLET ORAL 2 TIMES DAILY
Qty: 14 TABLET | Refills: 0 | Status: SHIPPED | OUTPATIENT
Start: 2022-09-09 | End: 2022-09-16

## 2022-09-09 RX ORDER — AZITHROMYCIN 500 MG/1
500 TABLET, FILM COATED ORAL DAILY
Qty: 7 TABLET | Refills: 0 | Status: SHIPPED | OUTPATIENT
Start: 2022-09-09 | End: 2022-09-16

## 2022-09-12 ENCOUNTER — PATIENT MESSAGE (OUTPATIENT)
Dept: FAMILY MEDICINE CLINIC | Age: 42
End: 2022-09-12

## 2022-09-12 RX ORDER — NICOTINE 21 MG/24HR
1 PATCH, TRANSDERMAL 24 HOURS TRANSDERMAL DAILY
Qty: 14 PATCH | Refills: 5 | Status: SHIPPED | OUTPATIENT
Start: 2022-09-12 | End: 2022-09-26

## 2022-09-13 ENCOUNTER — TELEPHONE (OUTPATIENT)
Dept: FAMILY MEDICINE CLINIC | Age: 42
End: 2022-09-13

## 2022-09-21 RX ORDER — ARIPIPRAZOLE 2 MG/1
2 TABLET ORAL DAILY
Qty: 30 TABLET | Refills: 3 | Status: SHIPPED | OUTPATIENT
Start: 2022-09-21

## 2022-10-11 DIAGNOSIS — F41.0 PANIC ATTACKS: ICD-10-CM

## 2022-10-11 RX ORDER — BUSPIRONE HYDROCHLORIDE 5 MG/1
5 TABLET ORAL 2 TIMES DAILY PRN
Qty: 60 TABLET | Refills: 1 | Status: SHIPPED | OUTPATIENT
Start: 2022-10-11

## 2022-10-12 DIAGNOSIS — R51.9 ACUTE NONINTRACTABLE HEADACHE, UNSPECIFIED HEADACHE TYPE: ICD-10-CM

## 2022-10-12 RX ORDER — ALBUTEROL SULFATE 90 UG/1
2 AEROSOL, METERED RESPIRATORY (INHALATION) 4 TIMES DAILY PRN
Qty: 1 EACH | Refills: 5 | Status: SHIPPED | OUTPATIENT
Start: 2022-10-12

## 2022-10-12 RX ORDER — TIZANIDINE 2 MG/1
2 TABLET ORAL NIGHTLY PRN
Qty: 30 TABLET | Refills: 2 | Status: SHIPPED | OUTPATIENT
Start: 2022-10-12 | End: 2022-11-15

## 2022-11-01 NOTE — CARE COORDINATION
Patient contacted regarding OEQST-44 diagnosis\". Discussed COVID-19 related testing which was available at this time. Test results were positive. Patient informed of results, if available? Yes  Patient says she has been positive since  and she does not feel any better. Care Transition Nurse/ Ambulatory Care Manager contacted the patient by telephone to perform post discharge assessment. Call within 2 business days of discharge: Yes. Verified name and  with patient as identifiers. Provided introduction to self, and explanation of the CTN/ACM role, and reason for call due to risk factors for infection and/or exposure to COVID-19. Symptoms reviewed with patient who verbalized the following symptoms: shortness of breath and chest pain. Due to increasing  encounter was not routed to provider for escalation. Discussed follow-up appointments. If no appointment was previously scheduled, appointment scheduling offered: Yes  [facility] follow up appointment(s): No future appointments. Non-[facility] follow up appointment(s):     Non-face-to-face services provided:  Patient says she will follow up with PCP in the am.  Patient says he suggested follow up with ED     Advance Care Planning:   Does patient have an Advance Directive:  not on file. Patient has following risk factors of: no known risk factors. CTN/ACM reviewed discharge instructions, medical action plan and red flags such as increased shortness of breath, increasing fever and signs of decompensation with patient who verbalized understanding. Discussed exposure protocols and quarantine with CDC Guidelines What to do if you are sick with coronavirus disease 2019.  Patient was given an opportunity for questions and concerns. The patient agrees to contact the [facility] exposure line [phone], local health department  and PCP office for questions related to their healthcare. CTN/ACM provided contact information for future needs.     Reviewed and What Type Of Note Output Would You Prefer (Optional)?: Standard Output Hpi Title: Evaluation of Skin Lesions educated patient on any new and changed medications related to discharge diagnosis     Patient/family/caregiver given information for [facility] Loop and agrees to enroll no  Patient's preferred e-mail:    Patient's preferred phone number:   Based on Loop alert triggers, patient will be contacted by nurse care manager for worsening symptoms. Plan for follow-up call in 5-7 days based on severity of symptoms and risk factors.

## 2022-11-15 DIAGNOSIS — R51.9 ACUTE NONINTRACTABLE HEADACHE, UNSPECIFIED HEADACHE TYPE: ICD-10-CM

## 2022-11-15 DIAGNOSIS — F41.0 PANIC ATTACKS: ICD-10-CM

## 2022-11-15 RX ORDER — BUSPIRONE HYDROCHLORIDE 5 MG/1
TABLET ORAL
Qty: 60 TABLET | Refills: 1 | Status: SHIPPED | OUTPATIENT
Start: 2022-11-15

## 2022-11-15 RX ORDER — TIZANIDINE 2 MG/1
TABLET ORAL
Qty: 30 TABLET | Refills: 2 | Status: SHIPPED | OUTPATIENT
Start: 2022-11-15

## 2022-11-15 RX ORDER — ARIPIPRAZOLE 2 MG/1
TABLET ORAL
Qty: 30 TABLET | Refills: 2 | Status: SHIPPED | OUTPATIENT
Start: 2022-11-15

## 2022-11-15 NOTE — TELEPHONE ENCOUNTER
Requested Prescriptions     Pending Prescriptions Disp Refills    ARIPiprazole (ABILIFY) 2 MG tablet [Pharmacy Med Name: ARIPIPRAZOLE 2 MG TABLET] 30 tablet 2     Sig: TAKE 1 TABLET BY MOUTH EVERY DAY    tiZANidine (ZANAFLEX) 2 MG tablet [Pharmacy Med Name: TIZANIDINE HCL 2 MG TABLET] 30 tablet 2     Sig: TAKE 1 TABLET BY MOUTH EVERY NIGHT AS NEEDED FOR NECK PAIN   Patient requesting a medication refill.   Pharmacy: [facility]  Next office visit: Visit date not found  Last regular office visit: 9/8/2022

## 2022-11-15 NOTE — TELEPHONE ENCOUNTER
Requested Prescriptions     Pending Prescriptions Disp Refills    busPIRone (BUSPAR) 5 MG tablet [Pharmacy Med Name: BUSPIRONE HCL 5 MG TABLET] 60 tablet 1     Sig: TAKE 1 TABLET BY MOUTH TWICE A DAY AS NEEDED FOR ANXIETY   Pharmacy: [facility]  Next office visit: Visit date not found  Last regular office visit: 9/8/2022

## 2022-11-22 ENCOUNTER — PATIENT MESSAGE (OUTPATIENT)
Dept: FAMILY MEDICINE CLINIC | Age: 42
End: 2022-11-22

## 2022-11-22 RX ORDER — NICOTINE 21 MG/24HR
1 PATCH, TRANSDERMAL 24 HOURS TRANSDERMAL DAILY
Qty: 42 PATCH | Refills: 0 | Status: SHIPPED | OUTPATIENT
Start: 2022-11-22 | End: 2023-01-03

## 2022-12-06 DIAGNOSIS — F41.9 ANXIETY AND DEPRESSION: ICD-10-CM

## 2022-12-06 DIAGNOSIS — F32.A ANXIETY AND DEPRESSION: ICD-10-CM

## 2022-12-06 RX ORDER — ESCITALOPRAM OXALATE 10 MG/1
TABLET ORAL
Qty: 90 TABLET | Refills: 0 | Status: SHIPPED | OUTPATIENT
Start: 2022-12-06

## 2022-12-12 DIAGNOSIS — F41.0 PANIC ATTACKS: ICD-10-CM

## 2022-12-12 DIAGNOSIS — F32.A ANXIETY AND DEPRESSION: ICD-10-CM

## 2022-12-12 DIAGNOSIS — F41.9 ANXIETY AND DEPRESSION: ICD-10-CM

## 2022-12-12 RX ORDER — ESCITALOPRAM OXALATE 10 MG/1
TABLET ORAL
Qty: 45 TABLET | Refills: 1 | Status: SHIPPED | OUTPATIENT
Start: 2022-12-12

## 2022-12-12 RX ORDER — BUSPIRONE HYDROCHLORIDE 5 MG/1
TABLET ORAL
Qty: 60 TABLET | Refills: 5 | Status: SHIPPED | OUTPATIENT
Start: 2022-12-12

## 2023-04-16 DIAGNOSIS — F41.9 ANXIETY AND DEPRESSION: ICD-10-CM

## 2023-04-16 DIAGNOSIS — F32.A ANXIETY AND DEPRESSION: ICD-10-CM

## 2023-04-17 RX ORDER — ESCITALOPRAM OXALATE 10 MG/1
TABLET ORAL
Qty: 45 TABLET | Refills: 2 | Status: SHIPPED | OUTPATIENT
Start: 2023-04-17

## 2023-12-15 NOTE — PROGRESS NOTES
Behavioral Health Note  [facility]    Date of Service:  1/27/2022  11:00am-12:00pm    Summary for PCP: Housing, financial stability, closest relationship, children's needs all in flux at this time. Genna Gutierrez is doing well with accessing support in therapy, self-calming and focusing on problem-solving. Presenting Concerns:  Radu Jade is a 42 y.o. who was referred by her primary care physician, Army Goltz Day, MDdue to concerns about depression and anxiety. Today Genna Gutierrez described \"I am at a continual loss for what to do\" as her son is in more trouble but she and her ex disagree over parenting decisions. Requested focus on this as immediate concern today. Also desired focus on personal life next steps, as housing with partner's parents will end in June and partner is inactive in addressing at this time. See goal-directed progress in these concerns in \"Progress in Treatment\" section below. Initially Reported Symptoms:  Panic attacks, anxiety when driving or riding in car, feelings of anxiousness,  Feeling disconnected from reality, strong need for routine/predictability. Specific perfectionistic in how to complete tasks. Compulsions. Trouble falling asleep, staying asleep, early waking. Racing thoughts during night waking. Avg 5 hrs of sleep/night. Other times goes to bed early and sleeps a lot. Reduced appetite during day, then snacks on \"junkfood\" all evening. 30 lb unintentional weight loss within a year. Hyper-focused, talks faster and louder over people. \"I get worked up. \" (Lasts 48 hrs)  Depressed feelings in day-to-day. Less severe than highs; still able to function during depression. Worries about:  Kids, housing, finances, being accepted by fiance and family. \"Waiting for next bomb to drop. \"     \"Always in survival mode. \"      Previous behavioral health treatment history: Therapy in teen years while in foster care.  Diagnosed with Bipolar Disorder. Family psychiatric history: Father Paranoid Schizophrenia; all 3 of Beth's children diagnosed with ADHD. Half-sister Down's Syndrome. Siblings and mother psychiatric history unknown. Administered PHQ-2 on 11/10/21 with score of 2. PHQ Scores 11/10/2021 6/14/2019   PHQ2 Score 2 2   PHQ9 Score 2 2   Interpretation of Total Score Depression Severity: 1-4 = Minimal depression, 5-9 = Mild depression, 10-14 = Moderate depression, 15-19 = Moderately severe depression, 20-27 = Severe depression  BETSEY-7 on 11/10/21 score 15, on 12/9/21 score 12. For Bipolar Disorder criteria assessed history and current. Ascribed to 5 of 8 depressive symptoms and 5 of 7 symptoms of yannick. Al Mims remembers short stretches with manic symptoms, none lasting for several days. At this time symptoms seem to fit ADHD hyperactivity traits more than BD manic episodes. Mental Status:  Appearance: within normal Limits   Affect:  consistent with expectations based upon mood   Attitude: Cooperative   Mood:   Anxious, worried   Thought Process:  goal directed   Delusions: no evidence of delusions   Perceptions: No perceptual disturbance   Behavior:   Engaged in session   Psychomotor: Within normal limits   Speech: Within normal limits   Eye Contact: good   Orientation:  oriented to person, place, time, and general circumstances   Judgment & Insight:  normal insight and judgment     Risk Assessment:  Current Suicide Risk:  no suicidal ideation, nonsuicidal morbid ideation  Current Homicide Risk:  no homocidal ideation  Al Mims reported no previous history of suicidal ideation or behavior. Diagnosis: Anxiety, moderate to severe  ADHD, Combined Type  Added 11/18/21  Post-traumatic stress symptoms  Provisional: Bipolar II Disorder if more information gained. Not quite at threshold.      Initial Assessment, Impressions and Plan:  Al Mims is a 42 y.o. old female who presents with moderate to severe anxiety symptoms that are interfering with her work, family and social functioning. Post-traumatic symptoms and attachment wounds also affect current functioning. Jaylon Zhong is sub-threshold for Bipolar II Disorder; more information to be gained. Will benefit from DBT to reduce anxiety symptoms, stabilize emotional regulation, and EFT to process trauma and establish secure attachment with self and others. Progress in Treatment:   1/27/22 Focused on goal reduce anxiety symptoms: Normalized emotions during housing risk. Practiced self-calming. Engaged in targeted problem-solving. Focused on goal emotional regulation: Provided presence and guidance in self-calming. Provided resources for Jaylon Zhong and ex-partner to consider local to her children. Affirmed Beth's emotions and care for children. Focused on goal process trauma: Did not address this goal directly today. Focused on goal establish secure attachment: Facilitated Beth's reflection on relationship with siblings and parent. Established reassurance with emotional safety in returning to extended family community for stable housing, employment, and social support. Contact Shanita Ramírez  at this office as needed. Attending to bill

## 2024-01-09 ENCOUNTER — APPOINTMENT (OUTPATIENT)
Dept: MRI IMAGING | Age: 44
End: 2024-01-09
Payer: COMMERCIAL

## 2024-01-09 ENCOUNTER — HOSPITAL ENCOUNTER (EMERGENCY)
Age: 44
Discharge: HOME | End: 2024-01-09
Payer: COMMERCIAL

## 2024-01-09 VITALS
SYSTOLIC BLOOD PRESSURE: 128 MMHG | TEMPERATURE: 98.6 F | RESPIRATION RATE: 16 BRPM | HEART RATE: 65 BPM | DIASTOLIC BLOOD PRESSURE: 109 MMHG | OXYGEN SATURATION: 98 %

## 2024-01-09 DIAGNOSIS — M54.50 ACUTE EXACERBATION OF CHRONIC LOW BACK PAIN: Primary | ICD-10-CM

## 2024-01-09 DIAGNOSIS — G89.29 ACUTE EXACERBATION OF CHRONIC LOW BACK PAIN: Primary | ICD-10-CM

## 2024-01-09 DIAGNOSIS — R51.9 ACUTE NONINTRACTABLE HEADACHE, UNSPECIFIED HEADACHE TYPE: ICD-10-CM

## 2024-01-09 LAB
ALBUMIN SERPL-MCNC: 4.5 G/DL (ref 3.4–5)
ALBUMIN/GLOB SERPL: 1.9 {RATIO} (ref 1.1–2.2)
ALP SERPL-CCNC: 61 U/L (ref 40–129)
ALT SERPL-CCNC: 8 U/L (ref 10–40)
ANION GAP SERPL CALCULATED.3IONS-SCNC: 9 MMOL/L (ref 3–16)
AST SERPL-CCNC: 13 U/L (ref 15–37)
BASOPHILS # BLD: 0 K/UL (ref 0–0.2)
BASOPHILS NFR BLD: 0.6 %
BILIRUB SERPL-MCNC: 0.5 MG/DL (ref 0–1)
BUN SERPL-MCNC: 13 MG/DL (ref 7–20)
CALCIUM SERPL-MCNC: 9.2 MG/DL (ref 8.3–10.6)
CHLORIDE SERPL-SCNC: 106 MMOL/L (ref 99–110)
CO2 SERPL-SCNC: 26 MMOL/L (ref 21–32)
CREAT SERPL-MCNC: 0.7 MG/DL (ref 0.6–1.1)
DEPRECATED RDW RBC AUTO: 13.5 % (ref 12.4–15.4)
EOSINOPHIL # BLD: 0.1 K/UL (ref 0–0.6)
EOSINOPHIL NFR BLD: 2.1 %
ERYTHROCYTE [SEDIMENTATION RATE] IN BLOOD BY WESTERGREN METHOD: 9 MM/HR (ref 0–20)
GFR SERPLBLD CREATININE-BSD FMLA CKD-EPI: >60 ML/MIN/{1.73_M2}
GLUCOSE SERPL-MCNC: 79 MG/DL (ref 70–99)
HCT VFR BLD AUTO: 41.5 % (ref 36–48)
HGB BLD-MCNC: 14.1 G/DL (ref 12–16)
LYMPHOCYTES # BLD: 0.9 K/UL (ref 1–5.1)
LYMPHOCYTES NFR BLD: 13.4 %
MCH RBC QN AUTO: 29.3 PG (ref 26–34)
MCHC RBC AUTO-ENTMCNC: 33.9 G/DL (ref 31–36)
MCV RBC AUTO: 86.7 FL (ref 80–100)
MONOCYTES # BLD: 0.5 K/UL (ref 0–1.3)
MONOCYTES NFR BLD: 7.1 %
NEUTROPHILS # BLD: 5.4 K/UL (ref 1.7–7.7)
NEUTROPHILS NFR BLD: 76.8 %
PLATELET # BLD AUTO: 293 K/UL (ref 135–450)
PMV BLD AUTO: 7.9 FL (ref 5–10.5)
POTASSIUM SERPL-SCNC: 4.3 MMOL/L (ref 3.5–5.1)
PROT SERPL-MCNC: 6.9 G/DL (ref 6.4–8.2)
RBC # BLD AUTO: 4.79 M/UL (ref 4–5.2)
SODIUM SERPL-SCNC: 141 MMOL/L (ref 136–145)
WBC # BLD AUTO: 7 K/UL (ref 4–11)

## 2024-01-09 PROCEDURE — 99284 EMERGENCY DEPT VISIT MOD MDM: CPT

## 2024-01-09 PROCEDURE — 85025 COMPLETE CBC W/AUTO DIFF WBC: CPT

## 2024-01-09 PROCEDURE — 80053 COMPREHEN METABOLIC PANEL: CPT

## 2024-01-09 PROCEDURE — 72148 MRI LUMBAR SPINE W/O DYE: CPT

## 2024-01-09 PROCEDURE — 6360000002 HC RX W HCPCS: Performed by: NURSE PRACTITIONER

## 2024-01-09 PROCEDURE — 36415 COLL VENOUS BLD VENIPUNCTURE: CPT

## 2024-01-09 PROCEDURE — 96374 THER/PROPH/DIAG INJ IV PUSH: CPT

## 2024-01-09 PROCEDURE — 85652 RBC SED RATE AUTOMATED: CPT

## 2024-01-09 RX ORDER — METHYLPREDNISOLONE 4 MG/1
TABLET ORAL
Qty: 1 KIT | Refills: 0 | Status: SHIPPED | OUTPATIENT
Start: 2024-01-09 | End: 2024-01-15

## 2024-01-09 RX ORDER — TIZANIDINE 2 MG/1
2 TABLET ORAL EVERY 6 HOURS PRN
Qty: 30 TABLET | Refills: 2 | Status: SHIPPED | OUTPATIENT
Start: 2024-01-09

## 2024-01-09 RX ORDER — KETOROLAC TROMETHAMINE 15 MG/ML
15 INJECTION, SOLUTION INTRAMUSCULAR; INTRAVENOUS ONCE
Status: COMPLETED | OUTPATIENT
Start: 2024-01-09 | End: 2024-01-09

## 2024-01-09 RX ADMIN — KETOROLAC TROMETHAMINE 15 MG: 15 INJECTION, SOLUTION INTRAMUSCULAR; INTRAVENOUS at 12:09

## 2024-01-09 ASSESSMENT — PAIN SCALES - GENERAL
PAINLEVEL_OUTOF10: 8
PAINLEVEL_OUTOF10: 3

## 2024-01-09 ASSESSMENT — PAIN - FUNCTIONAL ASSESSMENT
PAIN_FUNCTIONAL_ASSESSMENT: 0-10

## 2024-01-09 ASSESSMENT — PAIN DESCRIPTION - PAIN TYPE
TYPE: ACUTE PAIN
TYPE: ACUTE PAIN

## 2024-01-09 ASSESSMENT — PAIN DESCRIPTION - LOCATION
LOCATION: LEG;FOOT
LOCATION: BACK

## 2024-01-09 ASSESSMENT — PAIN DESCRIPTION - DESCRIPTORS: DESCRIPTORS: NUMBNESS

## 2024-01-09 ASSESSMENT — PAIN DESCRIPTION - FREQUENCY: FREQUENCY: CONTINUOUS

## 2024-01-09 ASSESSMENT — PAIN DESCRIPTION - ORIENTATION: ORIENTATION: RIGHT;LEFT

## 2024-01-21 SDOH — HEALTH STABILITY: PHYSICAL HEALTH: ON AVERAGE, HOW MANY DAYS PER WEEK DO YOU ENGAGE IN MODERATE TO STRENUOUS EXERCISE (LIKE A BRISK WALK)?: 0 DAYS

## 2024-01-22 ENCOUNTER — OFFICE VISIT (OUTPATIENT)
Dept: ORTHOPEDIC SURGERY | Age: 44
End: 2024-01-22
Payer: COMMERCIAL

## 2024-01-22 VITALS — HEIGHT: 67 IN | WEIGHT: 145.06 LBS | BODY MASS INDEX: 22.77 KG/M2

## 2024-01-22 DIAGNOSIS — M47.816 LUMBAR SPONDYLOSIS: Primary | ICD-10-CM

## 2024-01-22 PROCEDURE — 99203 OFFICE O/P NEW LOW 30 MIN: CPT | Performed by: ORTHOPAEDIC SURGERY

## 2024-01-22 PROCEDURE — 4004F PT TOBACCO SCREEN RCVD TLK: CPT | Performed by: ORTHOPAEDIC SURGERY

## 2024-01-22 PROCEDURE — G8484 FLU IMMUNIZE NO ADMIN: HCPCS | Performed by: ORTHOPAEDIC SURGERY

## 2024-01-22 PROCEDURE — G8420 CALC BMI NORM PARAMETERS: HCPCS | Performed by: ORTHOPAEDIC SURGERY

## 2024-01-22 PROCEDURE — G8427 DOCREV CUR MEDS BY ELIG CLIN: HCPCS | Performed by: ORTHOPAEDIC SURGERY

## 2024-01-22 RX ORDER — GABAPENTIN 600 MG/1
600 TABLET ORAL
COMMUNITY
End: 2024-01-26 | Stop reason: SDUPTHER

## 2024-01-22 RX ORDER — QUETIAPINE FUMARATE 50 MG/1
50 TABLET, EXTENDED RELEASE ORAL NIGHTLY
COMMUNITY

## 2024-01-22 RX ORDER — DULOXETIN HYDROCHLORIDE 60 MG/1
60 CAPSULE, DELAYED RELEASE ORAL DAILY
COMMUNITY

## 2024-01-22 NOTE — PROGRESS NOTES
New Patient: LUMBAR SPINE    Referring Provider:  No ref. provider found    CHIEF COMPLAINT:    Chief Complaint   Patient presents with    Back Pain     NP Lumbar       HISTORY OF PRESENT ILLNESS:    Ms. Beth Forman  is a pleasant 43 y.o. female status post anterior fusion 05 S1 distant past presents today with a 3-week history of low back and bilateral leg pain.  Her pain varies from 5/10 to 10/10.  She was numbness tingling and weakness of both lower extremities..  She denies saddle anesthesia and bowel or bladder dysfunction.      Current/Past Treatment:   Physical Therapy: Distant past  Chiropractic:  no   Injection: Distant past  Medications: Gabapentin Zanaflex and Naprosyn    Past Medical History:   Past Medical History:   Diagnosis Date    Bipolar disorder (HCC)     Pt was dx age 15    HPV in female     Medical history reviewed with no changes     Ovarian cyst     Ovarian cyst rupture       Past Surgical History:     Past Surgical History:   Procedure Laterality Date    BACK SURGERY  06/2013    LUMBAR FUSION    CERVIX SURGERY      biopsy    PAIN MANAGEMENT PROCEDURE N/A 2/18/2020    CERVICAL SIX SEVEN INTERLAMINAR EPIDURAL STEROID INJECTION SITE CONFIRMED BY FLUOROSCOPY performed by Yris Carter MD at [facility] OR    SPINAL FUSION  2013    L5 S1    TUBAL LIGATION  11/2010     Current Medications:     Current Outpatient Medications:     DULoxetine (CYMBALTA) 60 MG extended release capsule, Take 1 capsule by mouth daily, Disp: , Rfl:     QUEtiapine (SEROQUEL XR) 50 MG extended release tablet, Take 1 tablet by mouth nightly, Disp: , Rfl:     gabapentin (NEURONTIN) 600 MG tablet, Take 1 tablet by mouth. 2-4 times daily, Disp: , Rfl:     acetaminophen (APAP EXTRA STRENGTH) 500 MG tablet, Take 2 tablets by mouth every 6 hours as needed for Pain DO NOT TAKE WITH OTHER MEDICATIONS CONTAINING ACETAMINOPHEN., Disp: 30 tablet, Rfl: 0    tiZANidine (ZANAFLEX) 2 MG tablet, Take 1 tablet by mouth every 6

## 2024-01-23 ASSESSMENT — PATIENT HEALTH QUESTIONNAIRE - PHQ9
SUM OF ALL RESPONSES TO PHQ QUESTIONS 1-9: 3
4. FEELING TIRED OR HAVING LITTLE ENERGY: 1
10. IF YOU CHECKED OFF ANY PROBLEMS, HOW DIFFICULT HAVE THESE PROBLEMS MADE IT FOR YOU TO DO YOUR WORK, TAKE CARE OF THINGS AT HOME, OR GET ALONG WITH OTHER PEOPLE: SOMEWHAT DIFFICULT
4. FEELING TIRED OR HAVING LITTLE ENERGY: SEVERAL DAYS
6. FEELING BAD ABOUT YOURSELF - OR THAT YOU ARE A FAILURE OR HAVE LET YOURSELF OR YOUR FAMILY DOWN: NOT AT ALL
8. MOVING OR SPEAKING SO SLOWLY THAT OTHER PEOPLE COULD HAVE NOTICED. OR THE OPPOSITE, BEING SO FIGETY OR RESTLESS THAT YOU HAVE BEEN MOVING AROUND A LOT MORE THAN USUAL: 0
6. FEELING BAD ABOUT YOURSELF - OR THAT YOU ARE A FAILURE OR HAVE LET YOURSELF OR YOUR FAMILY DOWN: 0
5. POOR APPETITE OR OVEREATING: 1
SUM OF ALL RESPONSES TO PHQ QUESTIONS 1-9: 3
3. TROUBLE FALLING OR STAYING ASLEEP: 1
2. FEELING DOWN, DEPRESSED OR HOPELESS: 0
9. THOUGHTS THAT YOU WOULD BE BETTER OFF DEAD, OR OF HURTING YOURSELF: 0
2. FEELING DOWN, DEPRESSED OR HOPELESS: NOT AT ALL
1. LITTLE INTEREST OR PLEASURE IN DOING THINGS: NOT AT ALL
1. LITTLE INTEREST OR PLEASURE IN DOING THINGS: 0
SUM OF ALL RESPONSES TO PHQ QUESTIONS 1-9: 3
5. POOR APPETITE OR OVEREATING: SEVERAL DAYS
10. IF YOU CHECKED OFF ANY PROBLEMS, HOW DIFFICULT HAVE THESE PROBLEMS MADE IT FOR YOU TO DO YOUR WORK, TAKE CARE OF THINGS AT HOME, OR GET ALONG WITH OTHER PEOPLE: 1
9. THOUGHTS THAT YOU WOULD BE BETTER OFF DEAD, OR OF HURTING YOURSELF: NOT AT ALL
8. MOVING OR SPEAKING SO SLOWLY THAT OTHER PEOPLE COULD HAVE NOTICED. OR THE OPPOSITE - BEING SO FIDGETY OR RESTLESS THAT YOU HAVE BEEN MOVING AROUND A LOT MORE THAN USUAL: NOT AT ALL
7. TROUBLE CONCENTRATING ON THINGS, SUCH AS READING THE NEWSPAPER OR WATCHING TELEVISION: 0
3. TROUBLE FALLING OR STAYING ASLEEP: SEVERAL DAYS
SUM OF ALL RESPONSES TO PHQ9 QUESTIONS 1 & 2: 0
7. TROUBLE CONCENTRATING ON THINGS, SUCH AS READING THE NEWSPAPER OR WATCHING TELEVISION: NOT AT ALL

## 2024-01-26 ENCOUNTER — OFFICE VISIT (OUTPATIENT)
Dept: FAMILY MEDICINE CLINIC | Age: 44
End: 2024-01-26
Payer: COMMERCIAL

## 2024-01-26 VITALS
BODY MASS INDEX: 23.54 KG/M2 | OXYGEN SATURATION: 97 % | DIASTOLIC BLOOD PRESSURE: 86 MMHG | HEIGHT: 67 IN | RESPIRATION RATE: 16 BRPM | HEART RATE: 54 BPM | SYSTOLIC BLOOD PRESSURE: 120 MMHG | WEIGHT: 150 LBS

## 2024-01-26 DIAGNOSIS — M54.42 CHRONIC BILATERAL LOW BACK PAIN WITH BILATERAL SCIATICA: ICD-10-CM

## 2024-01-26 DIAGNOSIS — Z12.31 ENCOUNTER FOR SCREENING MAMMOGRAM FOR MALIGNANT NEOPLASM OF BREAST: ICD-10-CM

## 2024-01-26 DIAGNOSIS — F32.A ANXIETY AND DEPRESSION: ICD-10-CM

## 2024-01-26 DIAGNOSIS — F41.9 ANXIETY AND DEPRESSION: ICD-10-CM

## 2024-01-26 DIAGNOSIS — M54.41 CHRONIC BILATERAL LOW BACK PAIN WITH BILATERAL SCIATICA: ICD-10-CM

## 2024-01-26 DIAGNOSIS — Z00.00 WELL ADULT HEALTH CHECK: Primary | ICD-10-CM

## 2024-01-26 DIAGNOSIS — G89.29 CHRONIC BILATERAL LOW BACK PAIN WITH BILATERAL SCIATICA: ICD-10-CM

## 2024-01-26 PROBLEM — Z87.891 HISTORY OF TOBACCO USE: Status: ACTIVE | Noted: 2019-06-14

## 2024-01-26 PROBLEM — Z72.0 TOBACCO USE: Status: RESOLVED | Noted: 2019-06-14 | Resolved: 2024-01-26

## 2024-01-26 PROCEDURE — 99396 PREV VISIT EST AGE 40-64: CPT | Performed by: FAMILY MEDICINE

## 2024-01-26 PROCEDURE — G8484 FLU IMMUNIZE NO ADMIN: HCPCS | Performed by: FAMILY MEDICINE

## 2024-01-26 RX ORDER — GABAPENTIN 600 MG/1
600 TABLET ORAL 3 TIMES DAILY PRN
Qty: 90 TABLET | Refills: 0 | Status: SHIPPED | OUTPATIENT
Start: 2024-01-26 | End: 2024-02-25

## 2024-01-26 SDOH — ECONOMIC STABILITY: INCOME INSECURITY: HOW HARD IS IT FOR YOU TO PAY FOR THE VERY BASICS LIKE FOOD, HOUSING, MEDICAL CARE, AND HEATING?: NOT HARD AT ALL

## 2024-01-26 SDOH — ECONOMIC STABILITY: HOUSING INSECURITY
IN THE LAST 12 MONTHS, WAS THERE A TIME WHEN YOU DID NOT HAVE A STEADY PLACE TO SLEEP OR SLEPT IN A SHELTER (INCLUDING NOW)?: NO

## 2024-01-26 SDOH — ECONOMIC STABILITY: FOOD INSECURITY: WITHIN THE PAST 12 MONTHS, THE FOOD YOU BOUGHT JUST DIDN'T LAST AND YOU DIDN'T HAVE MONEY TO GET MORE.: NEVER TRUE

## 2024-01-26 SDOH — ECONOMIC STABILITY: FOOD INSECURITY: WITHIN THE PAST 12 MONTHS, YOU WORRIED THAT YOUR FOOD WOULD RUN OUT BEFORE YOU GOT MONEY TO BUY MORE.: NEVER TRUE

## 2024-01-26 NOTE — PROGRESS NOTES
1/26/2024    This is a 43 y.o. 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 Mild left foraminal stenosis at L4-L5.  2. Uncomplicated anterior fusion at L5-S1.    For her work and its impact, has missed some work due to this.  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    Reports sleep is ok  Physical activity somewhat limited by recent back issues  No significant dietary changes    Breast cancer screen  - reports being due    Pap UTD through Gyn    Review of Systems     Current Outpatient Medications   Medication Sig Dispense Refill    gabapentin (NEURONTIN) 600 MG tablet Take 1 tablet by mouth 3 times daily as needed (back pain) for up to 30 days. 90 tablet 0    DULoxetine (CYMBALTA) 60 MG extended release capsule Take 1 capsule by mouth daily      QUEtiapine (SEROQUEL XR) 50 MG extended release tablet Take 1 tablet by mouth nightly      tiZANidine (ZANAFLEX) 2 MG tablet Take 1 tablet by mouth every 6 hours as needed (back pain) 30 tablet 2    omeprazole (PRILOSEC) 20 MG delayed release capsule Take 1 capsule by mouth daily 90 capsule 1    albuterol sulfate HFA (VENTOLIN HFA) 108 (90 Base) MCG/ACT inhaler Inhale 2 puffs into the lungs 4 times daily as needed for Wheezing 1 each 5    naproxen (NAPROSYN) 500 MG tablet TAKE 1 TABLET BY MOUTH TWICE A DAY WITH MEALS 60 tablet 5

## 2024-01-31 ENCOUNTER — HOSPITAL ENCOUNTER (OUTPATIENT)
Dept: PHYSICAL THERAPY | Age: 44
Setting detail: THERAPIES SERIES
Discharge: HOME | End: 2024-01-31
Payer: COMMERCIAL

## 2024-01-31 DIAGNOSIS — M54.16 RADICULOPATHY OF LUMBAR REGION: Primary | ICD-10-CM

## 2024-01-31 PROCEDURE — 97112 NEUROMUSCULAR REEDUCATION: CPT

## 2024-01-31 PROCEDURE — 97162 PT EVAL MOD COMPLEX 30 MIN: CPT

## 2024-01-31 NOTE — PLAN OF CARE
[facility]- Outpatient Rehabilitation and Therapy [address] [place] [address] [place] [address] office: [phone] fax: [phone]     Physical Therapy Certification      Dear Ishaan Harmon MD,    We had the pleasure of evaluating the following patient for physical therapy services at [facility].  A summary of our findings can be found in the initial assessment below.  This includes our plan of care.  If you have any questions or concerns regarding these findings, please do not hesitate to contact me at the office phone number listed above.  Thank you for the referral.     Physician Signature:_______________________________Date:__________________  By signing above (or electronic signature), therapist’s plan is approved by physician       Initial Evaluation   Patient: Beth Forman (43 y.o. female)   Examination Date: 2024   :  1980 MRN: [id number]   Visit #: Visit count could not be calculated. Make sure you are using a visit which is associated with an episode.    Insurance: Payor: [employer] PLAN / Plan: [employer] PLAN / Product Type: *No Product type* /   Insurance ID: [id number] - (Medicaid Managed)  Secondary Insurance (if applicable):    Treatment Diagnosis:   No diagnosis found.   Medical Diagnosis:  Lumbar spondylosis [M47.816]   Referring Physician: Ishaan Harmon MD  PCP: Tonny Coronado MD                              Precautions/ Contra-indications:           Latex allergy:  NO  Pacemaker:    NO  Contraindications for Manipulation: remote history of spinal fusion (relative)  Date of Surgery:   Other:     Red Flags:  None    C-SSRS Triggered by Intake questionnaire:   [x] No, Questionnaire did not trigger screening.   [] Yes, Patient intake triggered further evaluation      [] C-SSRS Screening completed  [] PCP notified via Plan of Care  [] Emergency services notified     Preferred Language for Healthcare:   [x]

## 2024-01-31 NOTE — FLOWSHEET NOTE
[facility]- Outpatient Rehabilitation and Therapy  [facility] [place] [address] office: [phone] fax: [phone]      Physical Therapy: TREATMENT/PROGRESS NOTE   Patient: Beth Forman (43 y.o. female)   Treatment Date: 2024   :  1980 MRN: [id number]   Visit #: Visit count could not be calculated. Make sure you are using a visit which is associated with an episode.   Insurance Allowable Auth Needed   30 []Yes    [x]No    Insurance: Payor: [employer] PLAN / Plan: [employer] PLAN / Product Type: *No Product type* /   Insurance ID: [id number] - (Medicaid Managed)  Secondary Insurance (if applicable):    Treatment Diagnosis:     ICD-10-CM    1. Radiculopathy of lumbar region  M54.16          Medical Diagnosis:    Lumbar spondylosis [M47.816]   Referring Physician: Ishaan Harmon MD  PCP: Tonny Coronado MD                             Plan of care signed: NO    Date of Patient follow up with Physician:      Progress Report/POC: EVAL today  POC update due: (10 visits /OR AUTH LIMITS, whichever is less)  3/1/2024     Precautions/ Contra-indications:                                                                                          Latex allergy:  NO  Pacemaker:    NO  Contraindications for Manipulation: remote history of spinal fusion (relative)  Date of Surgery:   Other:     Preferred Language for Healthcare:   [x]English       []other:    SUBJECTIVE EXAMINATION     Patient Report/Comments: see eval     Test used Initial score  2024   Pain Summary VAS 3-4/10    Functional questionnaire Modified Oswestry 18/50 (36% disability)    Other:                OBJECTIVE EXAMINATION     Observation: See Eval    Test measurements: See Eval    Exercises/Interventions:     Therapeutic Ex (91359)  resistance Sets/time Reps Notes/Cues/Progressions   Nustep  8min     Repeated lumbar flexion   10 Increased, worse                 Manual

## 2024-02-05 ENCOUNTER — HOSPITAL ENCOUNTER (OUTPATIENT)
Dept: PHYSICAL THERAPY | Age: 44
Setting detail: THERAPIES SERIES
Discharge: HOME | End: 2024-02-05
Payer: COMMERCIAL

## 2024-02-05 PROCEDURE — 97112 NEUROMUSCULAR REEDUCATION: CPT

## 2024-02-05 PROCEDURE — 97110 THERAPEUTIC EXERCISES: CPT

## 2024-02-05 NOTE — FLOWSHEET NOTE
[facility]- Outpatient Rehabilitation and Therapy  [facility] [place] [address] office: [phone] fax: [phone]      Physical Therapy: TREATMENT/PROGRESS NOTE   Patient: Beth Forman (43 y.o. female)   Treatment Date: 2024   :  1980 MRN: [id number]   Visit #: Visit count could not be calculated. Make sure you are using a visit which is associated with an episode.   Insurance Allowable Auth Needed   30 []Yes    [x]No    Insurance: Payor: [employer] PLAN / Plan: [employer] PLAN / Product Type: *No Product type* /   Insurance ID: [id number] - (Medicaid Managed)  Secondary Insurance (if applicable):    Treatment Diagnosis:   No diagnosis found.     Medical Diagnosis:    Lumbar spondylosis [M47.816]   Referring Physician: Ishaan Harmon MD  PCP: Tonny Coronado MD                             Plan of care signed: NO    Date of Patient follow up with Physician:      Progress Report/POC: EVAL today  POC update due: (10 visits /OR AUTH LIMITS, whichever is less)  3/1/2024     Precautions/ Contra-indications:                                                                                          Latex allergy:  NO  Pacemaker:    NO  Contraindications for Manipulation: remote history of spinal fusion (relative)  Date of Surgery:   Other:     Preferred Language for Healthcare:   [x]English       []other:    SUBJECTIVE EXAMINATION     Patient Report/Comments: Pt notes minimal changes since her initial evaluation.     Test used Initial score  2024   Pain Summary VAS 3-4/10    Functional questionnaire Modified Oswestry 18/50 (36% disability)    Other:                OBJECTIVE EXAMINATION     Observation: See Eval    Test measurements: See Eval    Exercises/Interventions:     Therapeutic Ex (70423)  resistance Sets/time Reps Notes/Cues/Progressions   Nustep  8min     Lumbar flexion in sitting   10 Increased, worse - DC   Prone Lying

## 2024-02-06 ENCOUNTER — HOSPITAL ENCOUNTER (OUTPATIENT)
Dept: WOMENS IMAGING | Age: 44
Discharge: HOME | End: 2024-02-06
Payer: COMMERCIAL

## 2024-02-06 VITALS — HEIGHT: 67 IN | BODY MASS INDEX: 23.54 KG/M2 | WEIGHT: 150 LBS

## 2024-02-06 DIAGNOSIS — Z12.31 ENCOUNTER FOR SCREENING MAMMOGRAM FOR MALIGNANT NEOPLASM OF BREAST: ICD-10-CM

## 2024-02-06 PROCEDURE — 77063 BREAST TOMOSYNTHESIS BI: CPT

## 2024-02-07 ENCOUNTER — HOSPITAL ENCOUNTER (OUTPATIENT)
Dept: PHYSICAL THERAPY | Age: 44
Setting detail: THERAPIES SERIES
Discharge: HOME | End: 2024-02-07
Payer: COMMERCIAL

## 2024-02-07 PROCEDURE — 97112 NEUROMUSCULAR REEDUCATION: CPT

## 2024-02-07 PROCEDURE — 97530 THERAPEUTIC ACTIVITIES: CPT

## 2024-02-07 PROCEDURE — 97110 THERAPEUTIC EXERCISES: CPT

## 2024-02-07 NOTE — FLOWSHEET NOTE
[facility]- Outpatient Rehabilitation and Therapy  [facility] [place] [address] office: [phone] fax: [phone]      Physical Therapy: TREATMENT/PROGRESS NOTE   Patient: Beth Forman (43 y.o. female)   Treatment Date: 2024   :  1980 MRN: [id number]   Visit #: Visit count could not be calculated. Make sure you are using a visit which is associated with an episode.   Insurance Allowable Auth Needed   30 []Yes    [x]No    Insurance: Payor: [employer] PLAN / Plan: [employer] PLAN / Product Type: *No Product type* /   Insurance ID: [id number] - (Medicaid Managed)  Secondary Insurance (if applicable):    Treatment Diagnosis:   No diagnosis found.     Medical Diagnosis:    Lumbar spondylosis [M47.816]   Referring Physician: Ishaan Harmon MD  PCP: Tonny Coronado MD                             Plan of care signed: NO    Date of Patient follow up with Physician:      Progress Report/POC: EVAL today  POC update due: (10 visits /OR AUTH LIMITS, whichever is less)  3/1/2024     Precautions/ Contra-indications:                                                                                          Latex allergy:  NO  Pacemaker:    NO  Contraindications for Manipulation: remote history of spinal fusion (relative)  Date of Surgery:   Other:     Preferred Language for Healthcare:   [x]English       []other:    SUBJECTIVE EXAMINATION     Patient Report/Comments: Pt states that she had some initial improvements with lumbar extension activities LV, but has not had many improvements since then.     Test used Initial score  2024   Pain Summary VAS 3-4/10    Functional questionnaire Modified Oswestry 18/50 (36% disability)    Other:                OBJECTIVE EXAMINATION     Observation: See Eval    Test measurements: See Eval    Exercises/Interventions:     Therapeutic Ex (19597)  resistance Sets/time Reps Notes/Cues/Progressions

## 2024-02-12 ENCOUNTER — APPOINTMENT (OUTPATIENT)
Dept: PHYSICAL THERAPY | Age: 44
End: 2024-02-12
Payer: COMMERCIAL

## 2024-02-14 ENCOUNTER — APPOINTMENT (OUTPATIENT)
Dept: PHYSICAL THERAPY | Age: 44
End: 2024-02-14
Payer: COMMERCIAL

## 2024-02-21 ENCOUNTER — HOSPITAL ENCOUNTER (OUTPATIENT)
Dept: PHYSICAL THERAPY | Age: 44
Setting detail: THERAPIES SERIES
Discharge: HOME | End: 2024-02-21
Payer: COMMERCIAL

## 2024-02-21 PROCEDURE — 97112 NEUROMUSCULAR REEDUCATION: CPT

## 2024-02-21 PROCEDURE — 97530 THERAPEUTIC ACTIVITIES: CPT

## 2024-02-21 PROCEDURE — 97110 THERAPEUTIC EXERCISES: CPT

## 2024-02-21 NOTE — FLOWSHEET NOTE
[facility]- Outpatient Rehabilitation and Therapy  [facility] [place] [address] office: [phone] fax: [phone]      Physical Therapy: TREATMENT/PROGRESS NOTE   Patient: Beth Forman (43 y.o. female)   Treatment Date: 2024   :  1980 MRN: [id number]   Visit #: Visit count could not be calculated. Make sure you are using a visit which is associated with an episode.   Insurance Allowable Auth Needed   30 []Yes    [x]No    Insurance: Payor: [employer] PLAN / Plan: [employer] PLAN / Product Type: *No Product type* /   Insurance ID: [id number] - (Medicaid Managed)  Secondary Insurance (if applicable):    Treatment Diagnosis:   No diagnosis found.     Medical Diagnosis:    Lumbar spondylosis [M47.816]   Referring Physician: Ishaan Harmon MD  PCP: Tonny Coronado MD                             Plan of care signed: NO    Date of Patient follow up with Physician:      Progress Report/POC: EVAL today  POC update due: (10 visits /OR AUTH LIMITS, whichever is less)  3/1/2024     Precautions/ Contra-indications:                                                                                          Latex allergy:  NO  Pacemaker:    NO  Contraindications for Manipulation: remote history of spinal fusion (relative)  Date of Surgery:   Other:     Preferred Language for Healthcare:   [x]English       []other:    SUBJECTIVE EXAMINATION     Patient Report/Comments: Pt states that she has a tough weekend with work, noting increased difficulty with having 2 long days in a row.     Test used Initial score  2024   Pain Summary VAS 3-4/10    Functional questionnaire Modified Oswestry 18/50 (36% disability)    Other:                OBJECTIVE EXAMINATION     Observation: See Eval    Test measurements: See Eval    Exercises/Interventions:     Therapeutic Ex (62071)  resistance Sets/time Reps Notes/Cues/Progressions          Lumbar flexion in

## 2024-02-26 ENCOUNTER — APPOINTMENT (OUTPATIENT)
Dept: PHYSICAL THERAPY | Age: 44
End: 2024-02-26
Payer: COMMERCIAL

## 2024-02-28 ENCOUNTER — APPOINTMENT (OUTPATIENT)
Dept: PHYSICAL THERAPY | Age: 44
End: 2024-02-28
Payer: COMMERCIAL

## 2025-06-03 ENCOUNTER — HOSPITAL ENCOUNTER (OUTPATIENT)
Dept: WOMENS IMAGING | Age: 45
Discharge: HOME | End: 2025-06-03
Payer: COMMERCIAL

## 2025-06-03 ENCOUNTER — RESULTS FOLLOW-UP (OUTPATIENT)
Dept: FAMILY MEDICINE CLINIC | Age: 45
End: 2025-06-03

## 2025-06-03 VITALS — WEIGHT: 175 LBS | HEIGHT: 66 IN | BODY MASS INDEX: 28.12 KG/M2

## 2025-06-03 DIAGNOSIS — Z12.31 VISIT FOR SCREENING MAMMOGRAM: ICD-10-CM

## 2025-06-03 PROCEDURE — 77063 BREAST TOMOSYNTHESIS BI: CPT

## (undated) DEVICE — ALCOHOL RUBBING 16OZ 70% ISO

## (undated) DEVICE — STERILE POLYISOPRENE POWDER-FREE SURGICAL GLOVES: Brand: PROTEXIS

## (undated) DEVICE — GAUZE,SPONGE,4"X4",16PLY,STRL,LF,10/TRAY: Brand: MEDLINE

## (undated) DEVICE — TOWEL,OR,DSP,ST,BLUE,STD,4/PK,20PK/CS: Brand: MEDLINE

## (undated) DEVICE — CHLORAPREP 26ML ORANGE

## (undated) DEVICE — UNIVERSAL BLOCK TRAY: Brand: MEDLINE INDUSTRIES, INC.